# Patient Record
Sex: FEMALE | Race: OTHER | HISPANIC OR LATINO | URBAN - METROPOLITAN AREA
[De-identification: names, ages, dates, MRNs, and addresses within clinical notes are randomized per-mention and may not be internally consistent; named-entity substitution may affect disease eponyms.]

---

## 2017-04-23 ENCOUNTER — EMERGENCY (EMERGENCY)
Facility: HOSPITAL | Age: 22
LOS: 0 days | Discharge: HOME | End: 2017-04-23

## 2017-06-27 DIAGNOSIS — M54.2 CERVICALGIA: ICD-10-CM

## 2017-06-27 DIAGNOSIS — S46.911A STRAIN OF UNSPECIFIED MUSCLE, FASCIA AND TENDON AT SHOULDER AND UPPER ARM LEVEL, RIGHT ARM, INITIAL ENCOUNTER: ICD-10-CM
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
## 2017-07-24 ENCOUNTER — EMERGENCY (EMERGENCY)
Facility: HOSPITAL | Age: 22
LOS: 0 days | Discharge: HOME | End: 2017-07-24

## 2017-07-24 DIAGNOSIS — N89.8 OTHER SPECIFIED NONINFLAMMATORY DISORDERS OF VAGINA: ICD-10-CM

## 2017-07-24 DIAGNOSIS — Z87.891 PERSONAL HISTORY OF NICOTINE DEPENDENCE: ICD-10-CM

## 2017-12-12 ENCOUNTER — EMERGENCY (EMERGENCY)
Facility: HOSPITAL | Age: 22
LOS: 0 days | Discharge: HOME | End: 2017-12-12

## 2017-12-12 DIAGNOSIS — R07.9 CHEST PAIN, UNSPECIFIED: ICD-10-CM

## 2017-12-12 DIAGNOSIS — R05 COUGH: ICD-10-CM

## 2017-12-12 DIAGNOSIS — Z11.3 ENCOUNTER FOR SCREENING FOR INFECTIONS WITH A PREDOMINANTLY SEXUAL MODE OF TRANSMISSION: ICD-10-CM

## 2017-12-12 DIAGNOSIS — R06.02 SHORTNESS OF BREATH: ICD-10-CM

## 2017-12-13 ENCOUNTER — OUTPATIENT (OUTPATIENT)
Dept: OUTPATIENT SERVICES | Facility: HOSPITAL | Age: 22
LOS: 1 days | Discharge: HOME | End: 2017-12-13

## 2017-12-13 ENCOUNTER — APPOINTMENT (OUTPATIENT)
Dept: INTERNAL MEDICINE | Facility: CLINIC | Age: 22
End: 2017-12-13

## 2017-12-13 VITALS
BODY MASS INDEX: 20.91 KG/M2 | TEMPERATURE: 96.8 F | HEIGHT: 63 IN | RESPIRATION RATE: 16 BRPM | DIASTOLIC BLOOD PRESSURE: 77 MMHG | SYSTOLIC BLOOD PRESSURE: 118 MMHG | WEIGHT: 118 LBS | HEART RATE: 61 BPM

## 2017-12-13 DIAGNOSIS — F19.90 OTHER PSYCHOACTIVE SUBSTANCE USE, UNSPECIFIED, UNCOMPLICATED: ICD-10-CM

## 2017-12-13 RX ORDER — IBUPROFEN 200 MG/1
200 TABLET, FILM COATED ORAL
Qty: 14 | Refills: 0 | Status: ACTIVE | COMMUNITY
Start: 2017-12-13 | End: 1900-01-01

## 2017-12-23 ENCOUNTER — EMERGENCY (EMERGENCY)
Facility: HOSPITAL | Age: 22
LOS: 0 days | Discharge: HOME | End: 2017-12-23

## 2017-12-23 DIAGNOSIS — R30.0 DYSURIA: ICD-10-CM

## 2017-12-23 DIAGNOSIS — N89.8 OTHER SPECIFIED NONINFLAMMATORY DISORDERS OF VAGINA: ICD-10-CM

## 2017-12-25 ENCOUNTER — EMERGENCY (EMERGENCY)
Facility: HOSPITAL | Age: 22
LOS: 0 days | Discharge: HOME | End: 2017-12-25

## 2017-12-25 DIAGNOSIS — N89.8 OTHER SPECIFIED NONINFLAMMATORY DISORDERS OF VAGINA: ICD-10-CM

## 2017-12-25 DIAGNOSIS — L29.8 OTHER PRURITUS: ICD-10-CM

## 2018-01-04 LAB — CYTOLOGY CVX/VAG DOC THIN PREP: NORMAL

## 2018-01-11 ENCOUNTER — APPOINTMENT (OUTPATIENT)
Dept: INTERNAL MEDICINE | Facility: CLINIC | Age: 23
End: 2018-01-11

## 2018-01-24 ENCOUNTER — RESULT REVIEW (OUTPATIENT)
Age: 23
End: 2018-01-24

## 2018-01-25 ENCOUNTER — APPOINTMENT (OUTPATIENT)
Dept: INTERNAL MEDICINE | Facility: CLINIC | Age: 23
End: 2018-01-25

## 2018-01-25 ENCOUNTER — OUTPATIENT (OUTPATIENT)
Dept: OUTPATIENT SERVICES | Facility: HOSPITAL | Age: 23
LOS: 1 days | Discharge: HOME | End: 2018-01-25

## 2018-01-25 DIAGNOSIS — Z11.3 ENCOUNTER FOR SCREENING FOR INFECTIONS WITH A PREDOMINANTLY SEXUAL MODE OF TRANSMISSION: ICD-10-CM

## 2018-01-25 DIAGNOSIS — Z01.419 ENCOUNTER FOR GYNECOLOGICAL EXAMINATION (GENERAL) (ROUTINE) W/OUT ABNORMAL FINDINGS: ICD-10-CM

## 2018-01-27 ENCOUNTER — RESULT REVIEW (OUTPATIENT)
Age: 23
End: 2018-01-27

## 2018-01-30 LAB
CHLAMYDIA TRACHOMATIS(SIUH): NOT DETECTED
HBV CORE AB SER-ACNC: NONREACTIVE
HBV SURFACE AB SER-ACNC: NONREACTIVE
HBV SURFACE AG SER-ACNC: NONREACTIVE
HIV1 AB SER QL: NONREACTIVE
HSV1 DNA SPEC QL NAA+PROBE: NOT DETECTED
HSV2 DNA SPEC QL NAA+PROBE: NOT DETECTED
N GONORRHOEA RRNA SPEC QL NAA+PROBE: NOT DETECTED
SPECIMEN SOURCE: NORMAL
T PALLIDUM AB SER QL: NEGATIVE
T PALLIDUM AB SER-ACNC: <0.1 INDEX

## 2018-02-02 LAB — HUMAN PAPILLOMA VIRUS HR(SIUH): NOT DETECTED

## 2018-02-04 ENCOUNTER — EMERGENCY (EMERGENCY)
Facility: HOSPITAL | Age: 23
LOS: 0 days | Discharge: HOME | End: 2018-02-05
Attending: EMERGENCY MEDICINE | Admitting: INTERNAL MEDICINE

## 2018-02-04 VITALS
OXYGEN SATURATION: 98 % | RESPIRATION RATE: 16 BRPM | HEART RATE: 61 BPM | TEMPERATURE: 98 F | DIASTOLIC BLOOD PRESSURE: 79 MMHG | SYSTOLIC BLOOD PRESSURE: 120 MMHG

## 2018-02-04 VITALS
RESPIRATION RATE: 18 BRPM | OXYGEN SATURATION: 100 % | TEMPERATURE: 99 F | HEART RATE: 84 BPM | DIASTOLIC BLOOD PRESSURE: 70 MMHG | SYSTOLIC BLOOD PRESSURE: 118 MMHG

## 2018-02-04 DIAGNOSIS — O99.619 DISEASES OF THE DIGESTIVE SYSTEM COMPLICATING PREGNANCY, UNSPECIFIED TRIMESTER: ICD-10-CM

## 2018-02-04 DIAGNOSIS — R10.30 LOWER ABDOMINAL PAIN, UNSPECIFIED: ICD-10-CM

## 2018-02-04 DIAGNOSIS — Z79.899 OTHER LONG TERM (CURRENT) DRUG THERAPY: ICD-10-CM

## 2018-02-04 LAB
ALBUMIN SERPL ELPH-MCNC: 4.5 G/DL — SIGNIFICANT CHANGE UP (ref 3–5.5)
ALP SERPL-CCNC: 33 U/L — SIGNIFICANT CHANGE UP (ref 30–115)
ALT FLD-CCNC: 14 U/L — SIGNIFICANT CHANGE UP (ref 0–41)
ANION GAP SERPL CALC-SCNC: 8 MMOL/L — SIGNIFICANT CHANGE UP (ref 7–14)
APPEARANCE UR: (no result)
APTT BLD: 33.6 SEC — SIGNIFICANT CHANGE UP (ref 27–39.2)
AST SERPL-CCNC: 29 U/L — SIGNIFICANT CHANGE UP (ref 0–41)
BACTERIA # UR AUTO: (no result) /HPF
BASOPHILS # BLD AUTO: 0.03 K/UL — SIGNIFICANT CHANGE UP (ref 0–0.2)
BASOPHILS NFR BLD AUTO: 0.4 % — SIGNIFICANT CHANGE UP (ref 0–1)
BILIRUB DIRECT SERPL-MCNC: 0.4 MG/DL — HIGH (ref 0–0.2)
BILIRUB INDIRECT FLD-MCNC: 1 MG/DL — SIGNIFICANT CHANGE UP
BILIRUB SERPL-MCNC: 1.4 MG/DL — HIGH (ref 0.2–1.2)
BILIRUB UR-MCNC: NEGATIVE — SIGNIFICANT CHANGE UP
BUN SERPL-MCNC: 11 MG/DL — SIGNIFICANT CHANGE UP (ref 10–20)
CALCIUM SERPL-MCNC: 9.9 MG/DL — SIGNIFICANT CHANGE UP (ref 8.5–10.1)
CHLORIDE SERPL-SCNC: 103 MMOL/L — SIGNIFICANT CHANGE UP (ref 98–110)
CO2 SERPL-SCNC: 25 MMOL/L — SIGNIFICANT CHANGE UP (ref 17–32)
COLOR SPEC: YELLOW — SIGNIFICANT CHANGE UP
CREAT SERPL-MCNC: 0.6 MG/DL — LOW (ref 0.7–1.5)
DIFF PNL FLD: NEGATIVE — SIGNIFICANT CHANGE UP
EOSINOPHIL # BLD AUTO: 0.01 K/UL — SIGNIFICANT CHANGE UP (ref 0–0.7)
EOSINOPHIL NFR BLD AUTO: 0.1 % — SIGNIFICANT CHANGE UP (ref 0–8)
EPI CELLS # UR: (no result) /HPF
GLUCOSE SERPL-MCNC: 78 MG/DL — SIGNIFICANT CHANGE UP (ref 70–110)
GLUCOSE UR QL: NEGATIVE MG/DL — SIGNIFICANT CHANGE UP
HCT VFR BLD CALC: 42.6 % — SIGNIFICANT CHANGE UP (ref 37–47)
HGB BLD-MCNC: 13.7 G/DL — LOW (ref 14–18)
IMM GRANULOCYTES NFR BLD AUTO: 0.3 % — SIGNIFICANT CHANGE UP (ref 0.1–0.3)
INR BLD: 1.08 RATIO — SIGNIFICANT CHANGE UP (ref 0.65–1.3)
KETONES UR-MCNC: 40
LACTATE SERPL-SCNC: 1.4 MMOL/L — SIGNIFICANT CHANGE UP (ref 0.5–2.2)
LEUKOCYTE ESTERASE UR-ACNC: NEGATIVE — SIGNIFICANT CHANGE UP
LIDOCAIN IGE QN: 18 U/L — SIGNIFICANT CHANGE UP (ref 7–60)
LYMPHOCYTES # BLD AUTO: 2.14 K/UL — SIGNIFICANT CHANGE UP (ref 1.2–3.4)
LYMPHOCYTES # BLD AUTO: 29 % — SIGNIFICANT CHANGE UP (ref 20.5–51.1)
MCHC RBC-ENTMCNC: 26.9 PG — LOW (ref 27–31)
MCHC RBC-ENTMCNC: 32.2 G/DL — SIGNIFICANT CHANGE UP (ref 32–37)
MCV RBC AUTO: 83.7 FL — SIGNIFICANT CHANGE UP (ref 81–91)
MONOCYTES # BLD AUTO: 0.65 K/UL — HIGH (ref 0.1–0.6)
MONOCYTES NFR BLD AUTO: 8.8 % — SIGNIFICANT CHANGE UP (ref 1.7–9.3)
NEUTROPHILS # BLD AUTO: 4.54 K/UL — SIGNIFICANT CHANGE UP (ref 1.4–6.5)
NEUTROPHILS NFR BLD AUTO: 61.4 % — SIGNIFICANT CHANGE UP (ref 42.2–75.2)
NITRITE UR-MCNC: NEGATIVE — SIGNIFICANT CHANGE UP
NRBC # BLD: 0 /100 WBCS — SIGNIFICANT CHANGE UP (ref 0–0)
PH UR: 6.5 — SIGNIFICANT CHANGE UP (ref 5–8)
PLATELET # BLD AUTO: 242 K/UL — SIGNIFICANT CHANGE UP (ref 130–400)
POTASSIUM SERPL-MCNC: 4.7 MMOL/L — SIGNIFICANT CHANGE UP (ref 3.5–5)
POTASSIUM SERPL-SCNC: 4.7 MMOL/L — SIGNIFICANT CHANGE UP (ref 3.5–5)
PROT SERPL-MCNC: 7.2 G/DL — SIGNIFICANT CHANGE UP (ref 6–8)
PROT UR-MCNC: NEGATIVE MG/DL — SIGNIFICANT CHANGE UP
PROTHROM AB SERPL-ACNC: 11.7 SEC — SIGNIFICANT CHANGE UP (ref 9.95–12.87)
RBC # BLD: 5.09 M/UL — SIGNIFICANT CHANGE UP (ref 4.2–5.4)
RBC # FLD: 13.2 % — SIGNIFICANT CHANGE UP (ref 11.5–14.5)
RBC CASTS # UR COMP ASSIST: (no result) /HPF
SODIUM SERPL-SCNC: 136 MMOL/L — SIGNIFICANT CHANGE UP (ref 135–146)
SP GR SPEC: 1.02 — SIGNIFICANT CHANGE UP (ref 1.01–1.03)
UROBILINOGEN FLD QL: 1 MG/DL (ref 0.2–0.2)
WBC # BLD: 7.39 K/UL — SIGNIFICANT CHANGE UP (ref 4.8–10.8)
WBC # FLD AUTO: 7.39 K/UL — SIGNIFICANT CHANGE UP (ref 4.8–10.8)

## 2018-02-04 RX ORDER — ONDANSETRON 8 MG/1
4 TABLET, FILM COATED ORAL ONCE
Qty: 0 | Refills: 0 | Status: COMPLETED | OUTPATIENT
Start: 2018-02-04 | End: 2018-02-04

## 2018-02-04 RX ORDER — SODIUM CHLORIDE 9 MG/ML
3 INJECTION INTRAMUSCULAR; INTRAVENOUS; SUBCUTANEOUS ONCE
Qty: 0 | Refills: 0 | Status: COMPLETED | OUTPATIENT
Start: 2018-02-04 | End: 2018-02-04

## 2018-02-04 RX ADMIN — ONDANSETRON 4 MILLIGRAM(S): 8 TABLET, FILM COATED ORAL at 22:08

## 2018-02-04 RX ADMIN — SODIUM CHLORIDE 3 MILLILITER(S): 9 INJECTION INTRAMUSCULAR; INTRAVENOUS; SUBCUTANEOUS at 22:08

## 2018-02-04 NOTE — ED PROVIDER NOTE - PROGRESS NOTE DETAILS
Patient is being evaluated by surgery, pending their recommendations patient is not in ED, as per RN she searched for patient since 2140 hours and not found in ED, I have called patient over the phone and as per patient, her pain got better, so she decided to go home to follow up with her OBGYN doctor tomorrow. I encouraged patient to come back to ED ASAP, Pt verbalized understanding my recommendations and still preferred to follow up with her OBGYN doctor in AM.

## 2018-02-05 DIAGNOSIS — Z00.00 ENCOUNTER FOR GENERAL ADULT MEDICAL EXAMINATION WITHOUT ABNORMAL FINDINGS: ICD-10-CM

## 2018-02-05 LAB
BLD GP AB SCN SERPL QL: SIGNIFICANT CHANGE UP
HCG SERPL-ACNC: HIGH MIU/ML (ref 0–5)
TYPE + AB SCN PNL BLD: SIGNIFICANT CHANGE UP

## 2018-02-05 RX ORDER — METOCLOPRAMIDE HCL 10 MG
10 TABLET ORAL ONCE
Qty: 0 | Refills: 0 | Status: COMPLETED | OUTPATIENT
Start: 2018-02-05 | End: 2018-02-05

## 2018-02-05 RX ORDER — NITROFURANTOIN MACROCRYSTAL 50 MG
1 CAPSULE ORAL
Qty: 14 | Refills: 0 | OUTPATIENT
Start: 2018-02-05 | End: 2018-02-11

## 2018-02-05 RX ADMIN — Medication 104 MILLIGRAM(S): at 02:00

## 2018-02-05 NOTE — ED PROVIDER NOTE - CHIEF COMPLAINT
The patient is a 22y Female complaining of abdominal pain.
The patient is a 22y Female complaining of abdominal pain.

## 2018-02-05 NOTE — ED PROVIDER NOTE - PROGRESS NOTE DETAILS
patient remained stable in ED, improved well, patient had no abdominal pain during the ED course. patient tolerated PO, remained comfortable in ED  Discussed with patient in detail about her clinical condition, need for close outpatient follow up, antibiotics and pelvic rest, Patient verbalized understanding the aftercare instructions and agreed.   patient is awake, alert, o x 3, ambulatory in ED, comfortable and want to go home.   Pt is instructed well to f/u as outpatient, she verbalized understanding and agreed.

## 2018-02-05 NOTE — ED PROVIDER NOTE - OBJECTIVE STATEMENT
patient is c/o abd pain, intermittent episodes of cramping type lower abd area, associated with nausea and diarrhea, +dysuria, urgency, frequency, patient denies any vaginal bleeding, denies any fever/chills/chest pain/sob, denies any blood in the stool, denies any recent travel, no sick contacts. Denies any flank pain/back pain/upper abd pain. Denies any abdominal pain in ED. Patient states that she is hungry and want to eat.

## 2018-06-23 ENCOUNTER — INPATIENT (INPATIENT)
Facility: HOSPITAL | Age: 23
LOS: 2 days | Discharge: HOME | End: 2018-06-26
Attending: INTERNAL MEDICINE | Admitting: INTERNAL MEDICINE

## 2018-06-23 VITALS
RESPIRATION RATE: 18 BRPM | TEMPERATURE: 98 F | DIASTOLIC BLOOD PRESSURE: 71 MMHG | OXYGEN SATURATION: 98 % | HEART RATE: 58 BPM | SYSTOLIC BLOOD PRESSURE: 113 MMHG

## 2018-06-23 LAB
ANION GAP SERPL CALC-SCNC: 17 MMOL/L — HIGH (ref 7–14)
APTT BLD: 40.4 SEC — HIGH (ref 27–39.2)
BASOPHILS # BLD AUTO: 0.03 K/UL — SIGNIFICANT CHANGE UP (ref 0–0.2)
BASOPHILS NFR BLD AUTO: 0.4 % — SIGNIFICANT CHANGE UP (ref 0–1)
BUN SERPL-MCNC: 9 MG/DL — LOW (ref 10–20)
CALCIUM SERPL-MCNC: 9.4 MG/DL — SIGNIFICANT CHANGE UP (ref 8.5–10.1)
CHLORIDE SERPL-SCNC: 99 MMOL/L — SIGNIFICANT CHANGE UP (ref 98–110)
CO2 SERPL-SCNC: 22 MMOL/L — SIGNIFICANT CHANGE UP (ref 17–32)
CREAT SERPL-MCNC: 0.7 MG/DL — SIGNIFICANT CHANGE UP (ref 0.7–1.5)
EOSINOPHIL # BLD AUTO: 0.04 K/UL — SIGNIFICANT CHANGE UP (ref 0–0.7)
EOSINOPHIL NFR BLD AUTO: 0.5 % — SIGNIFICANT CHANGE UP (ref 0–8)
GLUCOSE SERPL-MCNC: 88 MG/DL — SIGNIFICANT CHANGE UP (ref 70–99)
HCG SERPL QL: NEGATIVE — SIGNIFICANT CHANGE UP
HCT VFR BLD CALC: 43.1 % — SIGNIFICANT CHANGE UP (ref 37–47)
HGB BLD-MCNC: 13.9 G/DL — SIGNIFICANT CHANGE UP (ref 12–16)
IMM GRANULOCYTES NFR BLD AUTO: 0.3 % — SIGNIFICANT CHANGE UP (ref 0.1–0.3)
INR BLD: 1.06 RATIO — SIGNIFICANT CHANGE UP (ref 0.65–1.3)
LYMPHOCYTES # BLD AUTO: 1.95 K/UL — SIGNIFICANT CHANGE UP (ref 1.2–3.4)
LYMPHOCYTES # BLD AUTO: 24.8 % — SIGNIFICANT CHANGE UP (ref 20.5–51.1)
MCHC RBC-ENTMCNC: 27.4 PG — SIGNIFICANT CHANGE UP (ref 27–31)
MCHC RBC-ENTMCNC: 32.3 G/DL — SIGNIFICANT CHANGE UP (ref 32–37)
MCV RBC AUTO: 84.8 FL — SIGNIFICANT CHANGE UP (ref 81–99)
MONOCYTES # BLD AUTO: 0.82 K/UL — HIGH (ref 0.1–0.6)
MONOCYTES NFR BLD AUTO: 10.4 % — HIGH (ref 1.7–9.3)
NEUTROPHILS # BLD AUTO: 5 K/UL — SIGNIFICANT CHANGE UP (ref 1.4–6.5)
NEUTROPHILS NFR BLD AUTO: 63.6 % — SIGNIFICANT CHANGE UP (ref 42.2–75.2)
NRBC # BLD: 0 /100 WBCS — SIGNIFICANT CHANGE UP (ref 0–0)
PLATELET # BLD AUTO: 227 K/UL — SIGNIFICANT CHANGE UP (ref 130–400)
POTASSIUM SERPL-MCNC: 4.8 MMOL/L — SIGNIFICANT CHANGE UP (ref 3.5–5)
POTASSIUM SERPL-SCNC: 4.8 MMOL/L — SIGNIFICANT CHANGE UP (ref 3.5–5)
PROTHROM AB SERPL-ACNC: 11.5 SEC — SIGNIFICANT CHANGE UP (ref 9.95–12.87)
RBC # BLD: 5.08 M/UL — SIGNIFICANT CHANGE UP (ref 4.2–5.4)
RBC # FLD: 13 % — SIGNIFICANT CHANGE UP (ref 11.5–14.5)
SODIUM SERPL-SCNC: 138 MMOL/L — SIGNIFICANT CHANGE UP (ref 135–146)
WBC # BLD: 7.86 K/UL — SIGNIFICANT CHANGE UP (ref 4.8–10.8)
WBC # FLD AUTO: 7.86 K/UL — SIGNIFICANT CHANGE UP (ref 4.8–10.8)

## 2018-06-23 RX ORDER — CEFTRIAXONE 500 MG/1
1 INJECTION, POWDER, FOR SOLUTION INTRAMUSCULAR; INTRAVENOUS ONCE
Qty: 0 | Refills: 0 | Status: COMPLETED | OUTPATIENT
Start: 2018-06-23 | End: 2018-06-23

## 2018-06-23 RX ORDER — OXYCODONE HYDROCHLORIDE 5 MG/1
5 TABLET ORAL
Qty: 0 | Refills: 0 | Status: DISCONTINUED | OUTPATIENT
Start: 2018-06-23 | End: 2018-06-26

## 2018-06-23 RX ORDER — METRONIDAZOLE 500 MG
500 TABLET ORAL EVERY 8 HOURS
Qty: 0 | Refills: 0 | Status: DISCONTINUED | OUTPATIENT
Start: 2018-06-24 | End: 2018-06-26

## 2018-06-23 RX ORDER — CEFTRIAXONE 500 MG/1
INJECTION, POWDER, FOR SOLUTION INTRAMUSCULAR; INTRAVENOUS
Qty: 0 | Refills: 0 | Status: DISCONTINUED | OUTPATIENT
Start: 2018-06-23 | End: 2018-06-26

## 2018-06-23 RX ORDER — KETOROLAC TROMETHAMINE 30 MG/ML
30 SYRINGE (ML) INJECTION THREE TIMES A DAY
Qty: 0 | Refills: 0 | Status: DISCONTINUED | OUTPATIENT
Start: 2018-06-23 | End: 2018-06-26

## 2018-06-23 RX ORDER — CEFTRIAXONE 500 MG/1
250 INJECTION, POWDER, FOR SOLUTION INTRAMUSCULAR; INTRAVENOUS ONCE
Qty: 0 | Refills: 0 | Status: COMPLETED | OUTPATIENT
Start: 2018-06-23 | End: 2018-06-23

## 2018-06-23 RX ORDER — METRONIDAZOLE 500 MG
TABLET ORAL
Qty: 0 | Refills: 0 | Status: DISCONTINUED | OUTPATIENT
Start: 2018-06-24 | End: 2018-06-26

## 2018-06-23 RX ORDER — AMPICILLIN SODIUM AND SULBACTAM SODIUM 250; 125 MG/ML; MG/ML
3 INJECTION, POWDER, FOR SUSPENSION INTRAMUSCULAR; INTRAVENOUS ONCE
Qty: 0 | Refills: 0 | Status: COMPLETED | OUTPATIENT
Start: 2018-06-23 | End: 2018-06-23

## 2018-06-23 RX ORDER — CEFTRIAXONE 500 MG/1
1 INJECTION, POWDER, FOR SOLUTION INTRAMUSCULAR; INTRAVENOUS EVERY 24 HOURS
Qty: 0 | Refills: 0 | Status: DISCONTINUED | OUTPATIENT
Start: 2018-06-24 | End: 2018-06-26

## 2018-06-23 RX ORDER — KETOROLAC TROMETHAMINE 30 MG/ML
30 SYRINGE (ML) INJECTION ONCE
Qty: 0 | Refills: 0 | Status: DISCONTINUED | OUTPATIENT
Start: 2018-06-23 | End: 2018-06-23

## 2018-06-23 RX ORDER — SODIUM CHLORIDE 9 MG/ML
1000 INJECTION INTRAMUSCULAR; INTRAVENOUS; SUBCUTANEOUS ONCE
Qty: 0 | Refills: 0 | Status: COMPLETED | OUTPATIENT
Start: 2018-06-23 | End: 2018-06-23

## 2018-06-23 RX ORDER — ACETAMINOPHEN 500 MG
650 TABLET ORAL EVERY 6 HOURS
Qty: 0 | Refills: 0 | Status: DISCONTINUED | OUTPATIENT
Start: 2018-06-23 | End: 2018-06-26

## 2018-06-23 RX ORDER — METRONIDAZOLE 500 MG
500 TABLET ORAL ONCE
Qty: 0 | Refills: 0 | Status: COMPLETED | OUTPATIENT
Start: 2018-06-23 | End: 2018-06-24

## 2018-06-23 RX ORDER — AZITHROMYCIN 500 MG/1
1 TABLET, FILM COATED ORAL ONCE
Qty: 0 | Refills: 0 | Status: COMPLETED | OUTPATIENT
Start: 2018-06-23 | End: 2018-06-23

## 2018-06-23 RX ADMIN — CEFTRIAXONE 250 MILLIGRAM(S): 500 INJECTION, POWDER, FOR SOLUTION INTRAMUSCULAR; INTRAVENOUS at 20:09

## 2018-06-23 RX ADMIN — SODIUM CHLORIDE 1000 MILLILITER(S): 9 INJECTION INTRAMUSCULAR; INTRAVENOUS; SUBCUTANEOUS at 18:26

## 2018-06-23 RX ADMIN — AZITHROMYCIN 1 GRAM(S): 500 TABLET, FILM COATED ORAL at 20:09

## 2018-06-23 RX ADMIN — AMPICILLIN SODIUM AND SULBACTAM SODIUM 200 GRAM(S): 250; 125 INJECTION, POWDER, FOR SUSPENSION INTRAMUSCULAR; INTRAVENOUS at 20:36

## 2018-06-23 NOTE — H&P ADULT - ATTENDING COMMENTS
21 y/o F with no significant PMH or PSH presented with left jaw pain, warmth and swelling for past 1 week associated with broken tooth. Patient states that pain is constant severe, sharp, radiates to her left ear and left side of her head, exacerbated by chewing and swallowing associated with foul smelling breath.     Otherwise, patient admits to being sexually active with one partner, without protection noted yellowish vaginal discharge.    PMH: No prior medical history.  PSH: No prior surgical history.       ROS:  CONSTITUTIONAL: No fever, chills, generalized weakness or fatigue.  EYES: No eye pain, visual disturbances, or discharge.  ENMT:  Left jaw pain, warmth and swelling for past 1 week associated with Left ear pain and hearing changes in left ear. No tinnitus, vertigo; No sinus or throat pain.  RESPIRATORY: No cough, wheezing, chills or hemoptysis; No shortness of breath.  CARDIOVASCULAR: No chest pain, palpitations, dizziness, or leg swelling.  GASTROINTESTINAL: No abdominal or epigastric pain. No nausea, vomiting, or hematemesis; No diarrhea or constipation. No melena or hematochezia.  GENITOURINARY: No dysuria, frequency, hematuria, or incontinence.  NEUROLOGICAL: No headaches, memory loss, loss of strength, numbness, or tremors.  SKIN: No itching, burning, rashes, or lesions.  ENDOCRINE: No heat or cold intolerance; No hair loss.  MUSCULOSKELETAL: No joint pain or swelling; No muscle, back, or extremity pain.  HEME/LYMPH: No easy bruising, or bleeding gums.  GYN: White vaginal discharge.  SKIN: No rash or itchiness.    PE:  GENERAL: NAD, well-groomed, well-developed.  HEAD:  Atraumatic, Normocephalic.  EYES: EOMI, PERRLA, conjunctiva and sclera clear.  ENMT: Left jaw swelling. Good dentition except at the site of single broken tooth. Moist mucous membranes. No lesions.  NERVOUS SYSTEM:  Alert & Oriented X3, Good concentration; No limb weakness or numbness.  RESPIRATORY: Clear to percussion bilaterally; No rales, rhonchi, wheezing, or rubs.  CARDIOVASCULAR: Regular rate and rhythm; No murmurs, rubs, or gallops.  GASTROINTESTINAL: Soft, Nontender, Nondistended; Bowel sounds present.  MUSCULOSKELETAL: Motor Strength 5/5 B/L upper and lower extremities;   EXTREMITIES:  No edema, cyanosis or clubbing.  LYMPH: Posterior cervical lymphadenopathy.  SKIN: Skin tags noted over right neck noted to be from birth.    # Left dental abscess  - c/w IV antibiotics, pain management  - Incision and drainage on monday as per dental  - further dental Xray as per dental    # Vaginal discharge and pain likely Vaginosis r/o STDs given h/o of unprotected sexual intercourse  - received 1g azithromycin, and 250mg IM ceftriaxone in ED  - STD testing    # DVT Prophylaxis  - encourage ambulation    All labs, radiologic studies, vitals, orders and medications list reviewed. Patient is seen and examined at bedside. Case discussed with medical team.

## 2018-06-23 NOTE — ED PROVIDER NOTE - FAMILY HISTORY
Mother  Still living? Yes, Estimated age: Age Unknown  Family history of breast cancer, Age at diagnosis: Age Unknown     Father  Still living? Unknown  Family history of diabetes mellitus (DM), Age at diagnosis: Age Unknown

## 2018-06-23 NOTE — H&P ADULT - NSHPSOCIALHISTORY_GEN_ALL_CORE
Social History:    Marital Status:  (   )    ( X ) Single    (   )    (  )     Substance Use (street drugs): (  ) never used  ( X ) other: Marijuana on a daily basis  Tobacco Usage:  (   ) never smoked   ( X ) former smoker   (   ) current smoker  (     ) pack year  (        ) last cigarette date  Alcohol Usage: occasional  Sexual History: active with one partner (unprotected) Social History:    Marital Status:  (   )    ( X ) Single    (   )    (  )     Substance Use (street drugs): (  ) never used  ( X ) other: Marijuana on a daily basis  Tobacco Usage:  (   ) never smoked   ( X ) former smoker   (   ) current smoker  (     ) pack year  (        ) last cigarette date 7 months ago  Alcohol Usage: occasional  Sexual History: active with one partner (unprotected)

## 2018-06-23 NOTE — H&P ADULT - NSHPLABSRESULTS_GEN_ALL_CORE
13.9   7.86  )-----------( 227      ( 23 Jun 2018 18:05 )             43.1     138  |  99  |  9<L>  ----------------------------<  88  4.8   |  22  |  0.7    Ca    9.4      23 Jun 2018 18:05    PT/INR - ( 23 Jun 2018 18:05 )   PT: 11.50 sec;   INR: 1.06 ratio      PTT - ( 23 Jun 2018 18:05 )  PTT:40.4 sec

## 2018-06-23 NOTE — CONSULT NOTE ADULT - ASSESSMENT
Recommend patient be admitted for IV antibiotics and pain management as per primary team. Patient will be monitored for improvement of swelling with possible incision and drainage tomorrow and/or transport to dental clinic Monday morning for radiographic evaluation and definitive treatment.    Please contact Dental via [employer] at #[phone]

## 2018-06-23 NOTE — ED PROVIDER NOTE - OBJECTIVE STATEMENT
patient states that one year ago accidentally broke her Lt lower molar tooth while biting hard, states that had not seen any doctor for it, but one week ago started having pain in and around the broken tooth area, which continued, five days ago started having swelling of the lt lower face, which continued and got worse, came to ED for evaluation. Denies any f/c/n/v/cp/sob. Patient also states that one week ago had unprotected sex, wanted to have abx, denies any symptoms, refused HIV test, states had HIV test one month ago and it was negative, so does not want to have another test. Patient denies any other symptoms.

## 2018-06-23 NOTE — ED PROVIDER NOTE - PROGRESS NOTE DETAILS
patient was evaluated by dental on call, will follow Dr. Neumann recommendations. patient remained stable in ED, as recommended by Dental, discussed with Hospitalist and MAR, patient care is transferred and patient is admitted.

## 2018-06-23 NOTE — ED PROVIDER NOTE - MEDICAL DECISION MAKING DETAILS
patient presented to ED for dental abscess, seen by dental on call, as recommended is admitted to Medicine for IV abx.

## 2018-06-23 NOTE — ED PROVIDER NOTE - PHYSICAL EXAMINATION
Face: +swelling/tenderness of the Lt lower face, no crepitus, no fluctuation  no trismus  oral cavity: Lt lower molar gum with swelling, dental caries with one of the molars missing crown, no discharge  supple neck, no lymphadenopathy.

## 2018-06-23 NOTE — H&P ADULT - FAMILY HISTORY
Mother  Still living? Yes, Estimated age: Age Unknown  Family history of breast cancer, Age at diagnosis: Age Unknown Mother  Still living? Yes, Estimated age: Age Unknown  Family history of breast cancer, Age at diagnosis: Age Unknown     Father  Still living? Unknown  Family history of diabetes mellitus (DM), Age at diagnosis: Age Unknown

## 2018-06-23 NOTE — H&P ADULT - HISTORY OF PRESENT ILLNESS
23 yo female patient with no pertinent past medical history, presented because of worsening left jaw swelling and pain over the last 4 days.  Patient says she has a dental crown that was broken its been more than a year. But recently she started having worsening severe pain of her left jaw, associated with swelling and redness and hottness of her left face. pain is severe, sharp, radiates to her left ear and left temple, exacerbated by chewing. Patient denies fever but acknowledge chills. She also has foul smelling breath.  On the side, patient says she is been sexually active with one partner, without protection. She started recently having yellowish vaginal discharge, associated with vaginal and lower abdominal pain and itchiness. She agrees to be tested for STDs including HIV.

## 2018-06-23 NOTE — ED PROVIDER NOTE - NS ED ROS FT
Lt facial swelling and Lt lower molar toothache and gum pain  unprotected sex one week ago, asymptomatic, want to have the abx for prophylaxis.

## 2018-06-23 NOTE — H&P ADULT - ASSESSMENT
21 yo female patient with no pertinent past medical history presented because of worsening left jaw swelling and pain over the last 4 days    # Left dental abscess  Seen by Dental in ED  Recommends: IV antibiotics, pain management, incision and drainage on monday  Will give rocephin/flagyl (to treat both dental abscess and STD at the same time)  Pain control with tylenol, NSAIDS, and in case of severe pain can give oxycodone    # Vaginal discharge and pain  R/O STDs (patient has history of unprotected sexual intercourse with one partner)  In ED patient received 1g azithromycin, and 250mg IM ceftriaxone  Will give flagyl too for possible bacterial vaginosis  consider Gyn consult if needed  Negative pregnancy test  Patient wants to be checked for STDs including HIV    # No need for DVT and GI prophylaxis (ambulating, young lady)  Regular diet, normal activity  Full code  dispo home

## 2018-06-23 NOTE — H&P ADULT - NSHPPHYSICALEXAM_GEN_ALL_CORE
PHYSICAL EXAM:    T(F): 98.3, Max: 98.3 (06-23-18 @ 17:27)  HR: 75  BP: 123/65  RR: 20  SpO2: 98%    GENERAL: NAD, well-developed  HEAD:  Left jaw/mandibular swelling and erythema and hottness  EYES: EOMI, PERRLA, conjunctiva and sclera clear  NECK: Supple, No JVD  CHEST/LUNG: Clear to auscultation bilaterally; No wheeze  HEART: Regular rate and rhythm; No murmurs, rubs, or gallops  ABDOMEN: Soft, Nontender, Nondistended; Bowel sounds present  EXTREMITIES:  2+ Peripheral Pulses, No clubbing, cyanosis, or edema  PSYCH: AAOx3  NEUROLOGY: non-focal  SKIN: No rashes or lesions

## 2018-06-24 RX ADMIN — OXYCODONE HYDROCHLORIDE 5 MILLIGRAM(S): 5 TABLET ORAL at 11:15

## 2018-06-24 RX ADMIN — Medication 30 MILLIGRAM(S): at 00:40

## 2018-06-24 RX ADMIN — Medication 100 MILLIGRAM(S): at 00:24

## 2018-06-24 RX ADMIN — Medication 100 MILLIGRAM(S): at 14:23

## 2018-06-24 RX ADMIN — OXYCODONE HYDROCHLORIDE 5 MILLIGRAM(S): 5 TABLET ORAL at 10:44

## 2018-06-24 RX ADMIN — Medication 30 MILLIGRAM(S): at 20:08

## 2018-06-24 RX ADMIN — Medication 30 MILLIGRAM(S): at 21:04

## 2018-06-24 RX ADMIN — Medication 30 MILLIGRAM(S): at 12:56

## 2018-06-24 RX ADMIN — Medication 100 MILLIGRAM(S): at 05:36

## 2018-06-24 RX ADMIN — Medication 100 MILLIGRAM(S): at 21:05

## 2018-06-24 RX ADMIN — Medication 30 MILLIGRAM(S): at 13:10

## 2018-06-24 RX ADMIN — Medication 30 MILLIGRAM(S): at 00:07

## 2018-06-24 RX ADMIN — CEFTRIAXONE 100 GRAM(S): 500 INJECTION, POWDER, FOR SOLUTION INTRAMUSCULAR; INTRAVENOUS at 22:24

## 2018-06-24 RX ADMIN — CEFTRIAXONE 100 GRAM(S): 500 INJECTION, POWDER, FOR SOLUTION INTRAMUSCULAR; INTRAVENOUS at 00:55

## 2018-06-24 RX ADMIN — OXYCODONE HYDROCHLORIDE 5 MILLIGRAM(S): 5 TABLET ORAL at 03:46

## 2018-06-24 NOTE — PROGRESS NOTE ADULT - SUBJECTIVE AND OBJECTIVE BOX
Patient states swelling has been improving, but pain and discomfort has been almost the same. No symptoms have worsened. Clinically patient is positive to palpation around #18, 19 and 20 lingually and buccally. Extra-oral palpation of positive. Swelling is firm extends from #17 to 22 intra oral and extraorally Could not pinpoint center for incisional and drainage. No difficulty swallowing or breathing.    Plan: Continue IV antibiotics and will transport patient tomorrow for further evaluation and treatment.

## 2018-06-25 LAB
ALBUMIN SERPL ELPH-MCNC: 3.7 G/DL — SIGNIFICANT CHANGE UP (ref 3.5–5.2)
ALP SERPL-CCNC: 32 U/L — SIGNIFICANT CHANGE UP (ref 30–115)
ALT FLD-CCNC: 10 U/L — SIGNIFICANT CHANGE UP (ref 0–41)
ANION GAP SERPL CALC-SCNC: 12 MMOL/L — SIGNIFICANT CHANGE UP (ref 7–14)
AST SERPL-CCNC: 25 U/L — SIGNIFICANT CHANGE UP (ref 0–41)
BILIRUB SERPL-MCNC: 0.5 MG/DL — SIGNIFICANT CHANGE UP (ref 0.2–1.2)
BUN SERPL-MCNC: 12 MG/DL — SIGNIFICANT CHANGE UP (ref 10–20)
CALCIUM SERPL-MCNC: 8.7 MG/DL — SIGNIFICANT CHANGE UP (ref 8.5–10.1)
CHLORIDE SERPL-SCNC: 103 MMOL/L — SIGNIFICANT CHANGE UP (ref 98–110)
CO2 SERPL-SCNC: 26 MMOL/L — SIGNIFICANT CHANGE UP (ref 17–32)
CREAT SERPL-MCNC: 0.8 MG/DL — SIGNIFICANT CHANGE UP (ref 0.7–1.5)
ERYTHROCYTE [SEDIMENTATION RATE] IN BLOOD: 8 MM/HR — SIGNIFICANT CHANGE UP (ref 0–15)
GLUCOSE SERPL-MCNC: 87 MG/DL — SIGNIFICANT CHANGE UP (ref 70–99)
HBV SURFACE AB SER-ACNC: SIGNIFICANT CHANGE UP
HBV SURFACE AG SER-ACNC: SIGNIFICANT CHANGE UP
HCT VFR BLD CALC: 35.6 % — LOW (ref 37–47)
HCV AB S/CO SERPL IA: 0.16 S/CO — SIGNIFICANT CHANGE UP
HCV AB SERPL-IMP: SIGNIFICANT CHANGE UP
HGB BLD-MCNC: 11.7 G/DL — LOW (ref 12–16)
HIV 1+2 AB+HIV1 P24 AG SERPL QL IA: SIGNIFICANT CHANGE UP
MAGNESIUM SERPL-MCNC: 1.9 MG/DL — SIGNIFICANT CHANGE UP (ref 1.8–2.4)
MCHC RBC-ENTMCNC: 27.9 PG — SIGNIFICANT CHANGE UP (ref 27–31)
MCHC RBC-ENTMCNC: 32.9 G/DL — SIGNIFICANT CHANGE UP (ref 32–37)
MCV RBC AUTO: 84.8 FL — SIGNIFICANT CHANGE UP (ref 81–99)
NRBC # BLD: 0 /100 WBCS — SIGNIFICANT CHANGE UP (ref 0–0)
PLATELET # BLD AUTO: 198 K/UL — SIGNIFICANT CHANGE UP (ref 130–400)
POTASSIUM SERPL-MCNC: 3.9 MMOL/L — SIGNIFICANT CHANGE UP (ref 3.5–5)
POTASSIUM SERPL-SCNC: 3.9 MMOL/L — SIGNIFICANT CHANGE UP (ref 3.5–5)
PROT SERPL-MCNC: 6.2 G/DL — SIGNIFICANT CHANGE UP (ref 6–8)
RBC # BLD: 4.2 M/UL — SIGNIFICANT CHANGE UP (ref 4.2–5.4)
RBC # FLD: 12.8 % — SIGNIFICANT CHANGE UP (ref 11.5–14.5)
SODIUM SERPL-SCNC: 141 MMOL/L — SIGNIFICANT CHANGE UP (ref 135–146)
T PALLIDUM AB TITR SER: NEGATIVE — SIGNIFICANT CHANGE UP
WBC # BLD: 5.29 K/UL — SIGNIFICANT CHANGE UP (ref 4.8–10.8)
WBC # FLD AUTO: 5.29 K/UL — SIGNIFICANT CHANGE UP (ref 4.8–10.8)

## 2018-06-25 RX ADMIN — Medication 650 MILLIGRAM(S): at 09:57

## 2018-06-25 RX ADMIN — Medication 30 MILLIGRAM(S): at 22:50

## 2018-06-25 RX ADMIN — CEFTRIAXONE 100 GRAM(S): 500 INJECTION, POWDER, FOR SOLUTION INTRAMUSCULAR; INTRAVENOUS at 23:37

## 2018-06-25 RX ADMIN — Medication 100 MILLIGRAM(S): at 16:25

## 2018-06-25 RX ADMIN — Medication 100 MILLIGRAM(S): at 05:02

## 2018-06-25 RX ADMIN — Medication 30 MILLIGRAM(S): at 22:01

## 2018-06-25 RX ADMIN — Medication 30 MILLIGRAM(S): at 05:01

## 2018-06-25 RX ADMIN — Medication 30 MILLIGRAM(S): at 05:57

## 2018-06-25 RX ADMIN — OXYCODONE HYDROCHLORIDE 5 MILLIGRAM(S): 5 TABLET ORAL at 00:47

## 2018-06-25 RX ADMIN — OXYCODONE HYDROCHLORIDE 5 MILLIGRAM(S): 5 TABLET ORAL at 23:37

## 2018-06-25 RX ADMIN — Medication 100 MILLIGRAM(S): at 21:36

## 2018-06-25 NOTE — CONSULT NOTE ADULT - SUBJECTIVE AND OBJECTIVE BOX
Patient is a 22y old  Female who presents with a chief complaint of left jaw swelling and pain of 4 days (23 Jun 2018 23:33)      HPI:  21 yo female patient with no pertinent past medical history, presented because of worsening left jaw swelling and pain over the last 4 days.  Patient says she has a dental crown that was broken its been more than a year. But recently she started having worsening severe pain of her left jaw, associated with swelling and redness and hottness of her left face. pain is severe, sharp, radiates to her left ear and left temple, exacerbated by chewing. Patient denies fever but acknowledge chills. She also has foul smelling breath.  On the side, patient says she is been sexually active with one partner, without protection. She started recently having yellowish vaginal discharge, associated with vaginal and lower abdominal pain and itchiness. She agrees to be tested for STDs including HIV. (23 Jun 2018 23:33)    Patient transported to dental for evaluation of dental swelling.       PAST MEDICAL & SURGICAL HISTORY:  No pertinent past medical history  No significant past surgical history    (   ) heart valve replacement  (   ) joint replacement  (   ) pregnancy    MEDICATIONS  (STANDING):  cefTRIAXone   IVPB      cefTRIAXone   IVPB 1 Gram(s) IV Intermittent every 24 hours  metroNIDAZOLE  IVPB 500 milliGRAM(s) IV Intermittent every 8 hours  metroNIDAZOLE  IVPB        MEDICATIONS  (PRN):  acetaminophen   Tablet. 650 milliGRAM(s) Oral every 6 hours PRN Mild Pain (1 - 3)  ketorolac   Injectable 30 milliGRAM(s) IV Push three times a day PRN Moderate Pain (4 - 6)  oxyCODONE    IR 5 milliGRAM(s) Oral two times a day PRN Severe Pain (7 - 10)      REVIEW OF SYSTEMS      General:	WNL    Skin/Breast:WNL  	  Ophthalmologic:WNL  	  ENMT:	WNL    Respiratory and Thorax:WNL  	  Cardiovascular:	WNL    Gastrointestinal:	WNL    Genitourinary:	WNL    Musculoskeletal:	WNL    Neurological:	WNL    Psychiatric:	WNL    Hematology/Lymphatics:	WNL  Endocrine:	WNL    Allergic/Immunologic:	none    Allergies: No Known Allergies    Intolerances: none        FAMILY HISTORY:  Family history of diabetes mellitus (DM) (Father)  Family history of breast cancer (Mother)      *SOCIAL HISTORY: (  Y )marijuana; ( Y  ) ETOH: occasionally     Vital Signs Last 24 Hrs  T(C): 36.4 (25 Jun 2018 12:54), Max: 36.4 (25 Jun 2018 12:54)  T(F): 97.6 (25 Jun 2018 12:54), Max: 97.6 (25 Jun 2018 12:54)  HR: 68 (25 Jun 2018 12:54) (60 - 73)  BP: 119/61 (25 Jun 2018 12:54) (105/55 - 119/61)  BP(mean): --  RR: 20 (25 Jun 2018 12:54) (18 - 20)  SpO2: --    LABS:                        11.7   5.29  )-----------( 198      ( 25 Jun 2018 07:24 )             35.6     06-25    141  |  103  |  12  ----------------------------<  87  3.9   |  26  |  0.8    Ca    8.7      25 Jun 2018 07:24  Mg     1.9     06-25    TPro  6.2  /  Alb  3.7  /  TBili  0.5  /  DBili  x   /  AST  25  /  ALT  10  /  AlkPhos  32  06-25    Platelet Count - Automated: 198 K/uL [130 - 400] (06-25 @ 07:24)  WBC Count: 5.29 K/uL [4.80 - 10.80] (06-25 @ 07:24)  WBC Count: 7.86 K/uL [4.80 - 10.80] (06-23 @ 18:05)  Platelet Count - Automated: 227 K/uL [130 - 400] (06-23 @ 18:05)  INR: 1.06 ratio [0.65 - 1.30] (06-23 @ 18:05)      PT/INR - ( 23 Jun 2018 18:05 )   PT: 11.50 sec;   INR: 1.06 ratio         PTT - ( 23 Jun 2018 18:05 )  PTT:40.4 sec    Last Dental Visit: <<  >>    EOE:  TMJ (n   ) clicks                     (n   ) pops                     ( n  ) crepitus             Mandible <<FROM>>             Facial bones and MOM <<grossly intact>>             (  n ) trismus             (  n ) lymphadenopathy             (  y ) swelling             (  y ) asymmetry             (  y ) palpation             (  n ) dyspnea             (  n ) dysphagia             (  n ) loss of consciousness    IOE:  <<permanent/primary/mixed>> dentition: permanent            <<grossly intact>> OR yes            <<multiple carious teeth>> OR generalized            <<multiple missing teeth>> no             Dentition Present <<  >> permanent                     Mobility <<  >>no                     Caries <<  >>yes                hard/soft palate:  (  n ) palatal torus, <<No pathology noted>>            tongue/FOM <<No pathology noted>>WNL            labial/buccal mucosa <<No pathology noted>>WNL           ( y  ) percussion #19           ( y  ) palpation #19           ( y  ) swelling #19 buccal area            ( y  ) abscess #19           ( n  ) sinus tract    *DENTAL RADIOGRAPHS: 1 panoramic taken    *ASSESSMENT: #19 severely decayed, root tips with significant buccal swelling, does not report any difficulty breathing or swallowing. Pain on palpation and percussion of #19.     *PLAN: Extraction #19 root tips and drainage of infection.    PROCEDURE:   Verbal and written consent given. Consent and side site signed. Risks and benefits reviewed as per oral surgery sheet dated 7/13/00.  Anesthesia: <<    >> Administered 2 carpule 2% lidocaine 1:100k epi via mandibular and long buccal blocks and 1 carpule 3% carbocaine PDL infiltration.  Treatment: <<    >> #19 extracted , irrigated with saline, curretaged socket, drainage successful, hemostasis obtained. Sutured socket using 3.0 gut sutures. Post op film taken.     RECOMMENDATIONS:  1) Primary team to proceed with IV antibiotics. When patient is ready to be discharged prescribe Amoxicillin 500 mg q8h disp :21 PO   2) Dental F/U with outpatient dentist for comprehensive dental care.   3) If any difficulty swallowing/breathing, fever occur, return to ER.     Resident Name, Cari Michael DMD

## 2018-06-25 NOTE — PROGRESS NOTE ADULT - ASSESSMENT
21 yo female patient with no pertinent past medical history presented because of worsening left jaw swelling and pain over the last 4 days    # Left dental abscess  i&D today , continue with antibiotics, pain controlled    # Vaginal discharge and pain  R/O STDs (patient has history of unprotected sexual intercourse with one partner)  In ED patient received 1g azithromycin, and 250mg IM ceftriaxone  if no improvement will pursue gyn consult     # No need for DVT and GI prophylaxis     #Anemia   no signs of acute blood loss, monitor    Full code  dispo home

## 2018-06-25 NOTE — PROGRESS NOTE ADULT - SUBJECTIVE AND OBJECTIVE BOX
WALKER COHEN 22y Female  MRN#: [id number]   CODE STATUS:________      SUBJECTIVE  Patient is a 22y old Female who presents with a chief complaint of left jaw swelling and pain of 4 days (23 Jun 2018 23:33)  Currently admitted to medicine with the primary diagnosis of Dental abscess   Had drainage of dental abscess today.      OBJECTIVE  PAST MEDICAL & SURGICAL HISTORY  No pertinent past medical history  No significant past surgical history    ALLERGIES:  No Known Allergies    MEDICATIONS:  STANDING MEDICATIONS  cefTRIAXone   IVPB 1 Gram(s) IV Intermittent every 24 hours  cefTRIAXone   IVPB      metroNIDAZOLE  IVPB      metroNIDAZOLE  IVPB 500 milliGRAM(s) IV Intermittent every 8 hours    PRN MEDICATIONS  acetaminophen   Tablet. 650 milliGRAM(s) Oral every 6 hours PRN  ketorolac   Injectable 30 milliGRAM(s) IV Push three times a day PRN  oxyCODONE    IR 5 milliGRAM(s) Oral two times a day PRN      VITAL SIGNS: Last 24 Hours  T(C): 36.4 (25 Jun 2018 12:54), Max: 36.4 (25 Jun 2018 12:54)  T(F): 97.6 (25 Jun 2018 12:54), Max: 97.6 (25 Jun 2018 12:54)  HR: 68 (25 Jun 2018 12:54) (60 - 73)  BP: 119/61 (25 Jun 2018 12:54) (105/55 - 119/61)  BP(mean): --  RR: 20 (25 Jun 2018 12:54) (18 - 20)  SpO2: --    LABS:                        11.7   5.29  )-----------( 198      ( 25 Jun 2018 07:24 )             35.6     06-25    141  |  103  |  12  ----------------------------<  87  3.9   |  26  |  0.8    Ca    8.7      25 Jun 2018 07:24  Mg     1.9     06-25    TPro  6.2  /  Alb  3.7  /  TBili  0.5  /  DBili  x   /  AST  25  /  ALT  10  /  AlkPhos  32  06-25    PT/INR - ( 23 Jun 2018 18:05 )   PT: 11.50 sec;   INR: 1.06 ratio         PTT - ( 23 Jun 2018 18:05 )  PTT:40.4 sec      Sedimentation Rate, Erythrocyte: 8 mm/hr (06-25-18 @ 07:24)          RADIOLOGY:      PHYSICAL EXAM:    GENERAL: NAD, well-developed, AAOx3  HEENT: Left jaw vry marcos to touch / couldnot open mouth ( assessed pre-op)  PULMONARY: Clear to auscultation bilaterally; No wheeze  CARDIOVASCULAR: Regular rate and rhythm; No murmurs, rubs, or gallops  GASTROINTESTINAL: Soft, Nontender, Nondistended; Bowel sounds present  MUSCULOSKELETAL:  2+ Peripheral Pulses, No clubbing, cyanosis, or edema  NEUROLOGY: non-focal  SKIN: No rashes or lesions

## 2018-06-25 NOTE — PROGRESS NOTE ADULT - SUBJECTIVE AND OBJECTIVE BOX
WALKER COHEN  22y  [facility] F6-4C [address]      Patient is a 22y old  Female who presents with a chief complaint of left jaw swelling and pain of 4 days (23 Jun 2018 23:33)      INTERVAL HPI/OVERNIGHT EVENTS:  no events overnight       REVIEW OF SYSTEMS:  CONSTITUTIONAL: No fever, weight loss, or fatigue  EYES: No eye pain, visual disturbances, or discharge  ENMT:  No difficulty hearing, tinnitus, vertigo; No sinus or throat pain, left face pain and tenderness   NECK: No pain or stiffness  BREASTS: No pain, masses, or nipple discharge  RESPIRATORY: No cough, wheezing, chills or hemoptysis; No shortness of breath  CARDIOVASCULAR: No chest pain, palpitations, dizziness, or leg swelling  GASTROINTESTINAL: No abdominal or epigastric pain. No nausea, vomiting, or hematemesis; No diarrhea or constipation. No melena or hematochezia.  GENITOURINARY: No dysuria, frequency, hematuria, or incontinence  NEUROLOGICAL: No headaches, memory loss, loss of strength, numbness, or tremors  SKIN: No itching, burning, rashes, or lesions   LYMPH NODES: No enlarged glands  ENDOCRINE: No heat or cold intolerance; No hair loss  MUSCULOSKELETAL: No joint pain or swelling; No muscle, back, or extremity pain  PSYCHIATRIC: No depression, anxiety, mood swings, or difficulty sleeping  HEME/LYMPH: No easy bruising, or bleeding gums  ALLERY AND IMMUNOLOGIC: No hives or eczema  FAMILY HISTORY:  Family history of diabetes mellitus (DM) (Father)  Family history of breast cancer (Mother)    T(C): 36.4 (06-25-18 @ 12:54), Max: 36.4 (06-25-18 @ 12:54)  HR: 68 (06-25-18 @ 12:54) (60 - 73)  BP: 119/61 (06-25-18 @ 12:54) (105/55 - 119/61)RR: 20 (  06-25-18 @ 12:54) (18 - 20)  SpO2: --  Wt(kg): --Vital Signs Last 24 Hrs  T(C): 36.4 (25 Jun 2018 12:54), Max: 36.4 (25 Jun 2018 12:54)  T(F): 97.6 (25 Jun 2018 12:54), Max: 97.6 (25 Jun 2018 12:54)  HR: 68 (25 Jun 2018 12:54) (60 - 73)  BP: 119/61 (25 Jun 2018 12:54) (105/55 - 119/61)  BP(mean): --  RR: 20 (25 Jun 2018 12:54) (18 - 20)  SpO2: --    PHYSICAL EXAM:  GENERAL: NAD, well-groomed, well-developed  HEAD:  Atraumatic, Normocephalic  EYES: EOMI, PERRLA, conjunctiva and sclera clear, edema of left face  ENMT: No tonsillar erythema, exudates, or enlargement; Moist mucous membranes, Good dentition, No lesions  NECK: Supple, No JVD, Normal thyroid  NERVOUS SYSTEM:  Alert & Oriented X3, Good concentration; Motor Strength 5/5 B/L upper and lower extremities; DTRs 2+ intact and symmetric  PULM: Clear to auscultation bilaterally  CARDIAC: Regular rate and rhythm; No murmurs, rubs, or gallops  GI: Soft, Nontender, Nondistended; Bowel sounds present  EXTREMITIES:  2+ Peripheral Pulses, No clubbing, cyanosis, or edema  LYMPH: No lymphadenopathy noted  SKIN: No rashes or lesions    Consultant(s) Notes Reviewed:  [x ] YES  [ ] NO  Care Discussed with Consultants/Other Providers [ x] YES  [ ] NO    LABS:                            11.7   5.29  )-----------( 198      ( 25 Jun 2018 07:24 )             35.6   06-25    141  |  103  |  12  ----------------------------<  87  3.9   |  26  |  0.8    Ca    8.7      25 Jun 2018 07:24  Mg     1.9     06-25    TPro  6.2  /  Alb  3.7  /  TBili  0.5  /  DBili  x   /  AST  25  /  ALT  10  /  AlkPhos  32  06-25            acetaminophen   Tablet. 650 milliGRAM(s) Oral every 6 hours PRN  cefTRIAXone   IVPB 1 Gram(s) IV Intermittent every 24 hours  cefTRIAXone   IVPB      ketorolac   Injectable 30 milliGRAM(s) IV Push three times a day PRN  metroNIDAZOLE  IVPB      metroNIDAZOLE  IVPB 500 milliGRAM(s) IV Intermittent every 8 hours  oxyCODONE    IR 5 milliGRAM(s) Oral two times a day PRN      HEALTH ISSUES - PROBLEM Dx:          Case Discussed with Jignesh Staff   45 minutes spent on total encounter; more than 50% of the visit was spent counseling and/or coordinating care by the attending physician.

## 2018-06-25 NOTE — PROGRESS NOTE ADULT - ASSESSMENT
ADMISSION SUMMARY  Patient is a 22y old Female who presents with a chief complaint of left jaw swelling and pain of 4 days (23 Jun 2018 23:33)  Currently admitted to medicine with the primary diagnosis of Dental abscess      ASSESSMENT & PLAN    1. DENTAL ABSCESS  Drained today  Continue IV Antibiotics for now.  Pain control  start soft diet  Assess pain and po tolerance tomorrow    2. Vaginal discharge  Resolved as per patient

## 2018-06-26 ENCOUNTER — TRANSCRIPTION ENCOUNTER (OUTPATIENT)
Age: 23
End: 2018-06-26

## 2018-06-26 VITALS
RESPIRATION RATE: 20 BRPM | DIASTOLIC BLOOD PRESSURE: 78 MMHG | TEMPERATURE: 99 F | SYSTOLIC BLOOD PRESSURE: 124 MMHG | HEART RATE: 91 BPM

## 2018-06-26 RX ORDER — ACETAMINOPHEN 500 MG
2 TABLET ORAL
Qty: 0 | Refills: 0 | DISCHARGE
Start: 2018-06-26

## 2018-06-26 RX ORDER — AMOXICILLIN 250 MG/5ML
500 SUSPENSION, RECONSTITUTED, ORAL (ML) ORAL
Qty: 10500 | Refills: 0
Start: 2018-06-26 | End: 2018-07-02

## 2018-06-26 RX ADMIN — Medication 100 MILLIGRAM(S): at 05:18

## 2018-06-26 RX ADMIN — OXYCODONE HYDROCHLORIDE 5 MILLIGRAM(S): 5 TABLET ORAL at 00:40

## 2018-06-26 NOTE — DISCHARGE NOTE ADULT - MEDICATION SUMMARY - MEDICATIONS TO TAKE
I will START or STAY ON the medications listed below when I get home from the hospital:    acetaminophen 325 mg oral tablet  -- 2 tab(s) by mouth every 6 hours, As needed, Mild Pain (1 - 3)  -- Indication: For DENTAL ABSCESS I will START or STAY ON the medications listed below when I get home from the hospital:    acetaminophen 325 mg oral tablet  -- 2 tab(s) by mouth every 6 hours, As needed, Mild Pain (1 - 3)  -- Indication: For DENTAL ABSCESS    amoxicillin 500 mg oral tablet  -- 500 tab(s) by mouth 3 times a day   -- Finish all this medication unless otherwise directed by prescriber.    -- Indication: For DENTAL ABSCESS

## 2018-06-26 NOTE — DISCHARGE NOTE ADULT - MEDICATION SUMMARY - MEDICATIONS TO STOP TAKING
I will STOP taking the medications listed below when I get home from the hospital:    Macrobid 100 mg oral capsule  -- 1 cap(s) by mouth 2 times a day MDD:2  -- Finish all this medication unless otherwise directed by prescriber.  May discolor urine or feces.  Take with food or milk.

## 2018-06-26 NOTE — DISCHARGE NOTE ADULT - ADDITIONAL INSTRUCTIONS
Follow up Appoitnment made for [facility] clinic [address] [facility] July 5, 2018 at 830 AM

## 2018-06-26 NOTE — DISCHARGE NOTE ADULT - CARE PROVIDER_API CALL
Armani Downey), Hospitalists  [address] [place] [address] [address]  [place] [address]  Phone: [phone]  Fax: [phone]

## 2018-06-26 NOTE — DISCHARGE NOTE ADULT - CARE PLAN
Principal Discharge DX:	Dental abscess  Goal:	treat infection  Assessment and plan of treatment:	incised and drained, received IV antibiotics  Continue PO amoxicillin for 1 week

## 2018-06-26 NOTE — DISCHARGE NOTE ADULT - PATIENT PORTAL LINK FT
You can access the [facility] Portal, offered by [facility], by registering with the following website: [email or web address]

## 2018-06-26 NOTE — DISCHARGE NOTE ADULT - REASON FOR ADMISSION
left jaw swelling and pain of 4 days left jaw swelling and pain of 4 days  diagnosed as dental abbscess

## 2018-06-26 NOTE — DISCHARGE NOTE ADULT - HOSPITAL COURSE
23 yo female patient with no pertinent past medical history, presented because of worsening left jaw swelling and pain over the last 4 days.  She also started recently having yellowish vaginal discharge, associated with vaginal and lower abdominal pain and itchiness. STDs negative.  Received IV rocephin and Flagyl. Denral abscess incised aand draines. Post op tolerating PO soft diet with no pain.

## 2018-06-28 ENCOUNTER — OTHER (OUTPATIENT)
Age: 23
End: 2018-06-28

## 2018-07-02 DIAGNOSIS — R22.0 LOCALIZED SWELLING, MASS AND LUMP, HEAD: ICD-10-CM

## 2018-07-02 DIAGNOSIS — K04.7 PERIAPICAL ABSCESS WITHOUT SINUS: ICD-10-CM

## 2018-07-02 DIAGNOSIS — Z87.891 PERSONAL HISTORY OF NICOTINE DEPENDENCE: ICD-10-CM

## 2018-07-02 DIAGNOSIS — F12.90 CANNABIS USE, UNSPECIFIED, UNCOMPLICATED: ICD-10-CM

## 2018-07-02 DIAGNOSIS — N76.0 ACUTE VAGINITIS: ICD-10-CM

## 2018-07-02 DIAGNOSIS — D64.9 ANEMIA, UNSPECIFIED: ICD-10-CM

## 2018-07-05 ENCOUNTER — APPOINTMENT (OUTPATIENT)
Dept: INTERNAL MEDICINE | Facility: CLINIC | Age: 23
End: 2018-07-05

## 2018-08-23 ENCOUNTER — EMERGENCY (EMERGENCY)
Facility: HOSPITAL | Age: 23
LOS: 0 days | Discharge: HOME | End: 2018-08-23
Admitting: PHYSICIAN ASSISTANT

## 2018-08-23 VITALS — WEIGHT: 125.66 LBS | HEIGHT: 64 IN

## 2018-08-23 VITALS
TEMPERATURE: 97 F | SYSTOLIC BLOOD PRESSURE: 112 MMHG | OXYGEN SATURATION: 98 % | DIASTOLIC BLOOD PRESSURE: 57 MMHG | HEART RATE: 65 BPM | RESPIRATION RATE: 18 BRPM

## 2018-08-23 DIAGNOSIS — N76.0 ACUTE VAGINITIS: ICD-10-CM

## 2018-08-23 DIAGNOSIS — N89.8 OTHER SPECIFIED NONINFLAMMATORY DISORDERS OF VAGINA: ICD-10-CM

## 2018-08-23 RX ORDER — CEFTRIAXONE 500 MG/1
250 INJECTION, POWDER, FOR SOLUTION INTRAMUSCULAR; INTRAVENOUS ONCE
Qty: 0 | Refills: 0 | Status: COMPLETED | OUTPATIENT
Start: 2018-08-23 | End: 2018-08-23

## 2018-08-23 RX ORDER — AZITHROMYCIN 500 MG/1
1000 TABLET, FILM COATED ORAL ONCE
Qty: 0 | Refills: 0 | Status: COMPLETED | OUTPATIENT
Start: 2018-08-23 | End: 2018-08-23

## 2018-08-23 RX ADMIN — CEFTRIAXONE 250 MILLIGRAM(S): 500 INJECTION, POWDER, FOR SOLUTION INTRAMUSCULAR; INTRAVENOUS at 13:45

## 2018-08-23 RX ADMIN — AZITHROMYCIN 1000 MILLIGRAM(S): 500 TABLET, FILM COATED ORAL at 13:45

## 2018-08-23 NOTE — ED PROVIDER NOTE - GENITOURINARY, MLM
Chaperone BRAD Falcon: no rash, (+) white vaginal d/c, cx obtained, no CMT, no palpable uterine or adnexal masses
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
## 2018-08-23 NOTE — ED ADULT NURSE NOTE - NSIMPLEMENTINTERV_GEN_ALL_ED
Implemented All Universal Safety Interventions:  Velpen to call system. Call bell, personal items and telephone within reach. Instruct patient to call for assistance. Room bathroom lighting operational. Non-slip footwear when patient is off stretcher. Physically safe environment: no spills, clutter or unnecessary equipment. Stretcher in lowest position, wheels locked, appropriate side rails in place.

## 2018-08-23 NOTE — ED PROVIDER NOTE - MEDICAL DECISION MAKING DETAILS
follow up with ob/gyn; counseled on safer sex practices; any new or worsening symptoms, pt will return to ER follow up with ob/gyn; counseled on safer sex practices; any new or worsening symptoms, pt will return to ER  Note complete

## 2018-08-24 LAB
C TRACH RRNA SPEC QL NAA+PROBE: SIGNIFICANT CHANGE UP
CULTURE RESULTS: SIGNIFICANT CHANGE UP
N GONORRHOEA RRNA SPEC QL NAA+PROBE: SIGNIFICANT CHANGE UP
SPECIMEN SOURCE: SIGNIFICANT CHANGE UP
SPECIMEN SOURCE: SIGNIFICANT CHANGE UP

## 2018-09-05 ENCOUNTER — EMERGENCY (EMERGENCY)
Facility: HOSPITAL | Age: 23
LOS: 0 days | Discharge: HOME | End: 2018-09-05
Admitting: PHYSICIAN ASSISTANT

## 2018-09-05 VITALS
DIASTOLIC BLOOD PRESSURE: 65 MMHG | OXYGEN SATURATION: 99 % | SYSTOLIC BLOOD PRESSURE: 111 MMHG | RESPIRATION RATE: 18 BRPM | HEART RATE: 80 BPM | TEMPERATURE: 98 F

## 2018-09-05 DIAGNOSIS — Z79.2 LONG TERM (CURRENT) USE OF ANTIBIOTICS: ICD-10-CM

## 2018-09-05 DIAGNOSIS — Z79.899 OTHER LONG TERM (CURRENT) DRUG THERAPY: ICD-10-CM

## 2018-09-05 DIAGNOSIS — N89.8 OTHER SPECIFIED NONINFLAMMATORY DISORDERS OF VAGINA: ICD-10-CM

## 2018-09-05 DIAGNOSIS — N76.0 ACUTE VAGINITIS: ICD-10-CM

## 2018-09-05 RX ORDER — CEFTRIAXONE 500 MG/1
250 INJECTION, POWDER, FOR SOLUTION INTRAMUSCULAR; INTRAVENOUS ONCE
Qty: 0 | Refills: 0 | Status: COMPLETED | OUTPATIENT
Start: 2018-09-05 | End: 2018-09-05

## 2018-09-05 RX ORDER — AZITHROMYCIN 500 MG/1
1000 TABLET, FILM COATED ORAL ONCE
Qty: 0 | Refills: 0 | Status: COMPLETED | OUTPATIENT
Start: 2018-09-05 | End: 2018-09-05

## 2018-09-05 RX ADMIN — CEFTRIAXONE 250 MILLIGRAM(S): 500 INJECTION, POWDER, FOR SOLUTION INTRAMUSCULAR; INTRAVENOUS at 13:41

## 2018-09-05 RX ADMIN — AZITHROMYCIN 1000 MILLIGRAM(S): 500 TABLET, FILM COATED ORAL at 13:41

## 2018-09-05 NOTE — ED PROVIDER NOTE - OBJECTIVE STATEMENT
22 y.o. female presented to the ER c/o vaginal d/c for the past 3 weeks.  Pt denies fever, chills, dysuria, rash, abnormal vaginal bleeding, pelvic/abdominal  pain. 22 y.o. female presented to the ER c/o vaginal d/c for the past week.  Pt denies fever, chills, dysuria, rash, abnormal vaginal bleeding, pelvic/abdominal pain.  LMP 3 weeks ago normal.  No other complaints.

## 2018-09-05 NOTE — ED PROVIDER NOTE - MEDICAL DECISION MAKING DETAILS
cx sent; counseled on safer sex practices; follow up with GYN scheduled; any new or worsening symptoms, pt will return to ER cx sent; counseled on safer sex practices; follow up with GYN scheduled; any new or worsening symptoms, pt will return to ER  Note complete

## 2018-09-05 NOTE — ED PROVIDER NOTE - GENITOURINARY, MLM
Chaperone: BRAD Gant (+) White thin vaginal d/c, no rash, no lesions, no adnexal or uterine masses, no CMT

## 2018-09-06 LAB
C TRACH RRNA SPEC QL NAA+PROBE: SIGNIFICANT CHANGE UP
N GONORRHOEA RRNA SPEC QL NAA+PROBE: SIGNIFICANT CHANGE UP
SPECIMEN SOURCE: SIGNIFICANT CHANGE UP

## 2018-10-08 ENCOUNTER — EMERGENCY (EMERGENCY)
Facility: HOSPITAL | Age: 23
LOS: 0 days | Discharge: HOME | End: 2018-10-08
Attending: EMERGENCY MEDICINE | Admitting: EMERGENCY MEDICINE

## 2018-10-08 VITALS
DIASTOLIC BLOOD PRESSURE: 60 MMHG | SYSTOLIC BLOOD PRESSURE: 110 MMHG | TEMPERATURE: 97 F | RESPIRATION RATE: 18 BRPM | HEART RATE: 60 BPM | OXYGEN SATURATION: 100 %

## 2018-10-08 VITALS
RESPIRATION RATE: 20 BRPM | HEART RATE: 55 BPM | TEMPERATURE: 97 F | DIASTOLIC BLOOD PRESSURE: 116 MMHG | SYSTOLIC BLOOD PRESSURE: 116 MMHG | OXYGEN SATURATION: 99 %

## 2018-10-08 DIAGNOSIS — O98.311 OTHER INFECTIONS WITH A PREDOMINANTLY SEXUAL MODE OF TRANSMISSION COMPLICATING PREGNANCY, FIRST TRIMESTER: ICD-10-CM

## 2018-10-08 DIAGNOSIS — Z34.90 ENCOUNTER FOR SUPERVISION OF NORMAL PREGNANCY, UNSPECIFIED, UNSPECIFIED TRIMESTER: ICD-10-CM

## 2018-10-08 DIAGNOSIS — R10.31 RIGHT LOWER QUADRANT PAIN: ICD-10-CM

## 2018-10-08 DIAGNOSIS — Z79.2 LONG TERM (CURRENT) USE OF ANTIBIOTICS: ICD-10-CM

## 2018-10-08 DIAGNOSIS — O20.8 OTHER HEMORRHAGE IN EARLY PREGNANCY: ICD-10-CM

## 2018-10-08 DIAGNOSIS — N94.9 UNSPECIFIED CONDITION ASSOCIATED WITH FEMALE GENITAL ORGANS AND MENSTRUAL CYCLE: ICD-10-CM

## 2018-10-08 DIAGNOSIS — Z79.899 OTHER LONG TERM (CURRENT) DRUG THERAPY: ICD-10-CM

## 2018-10-08 LAB
ALBUMIN SERPL ELPH-MCNC: 4.6 G/DL — SIGNIFICANT CHANGE UP (ref 3.5–5.2)
ALP SERPL-CCNC: 38 U/L — SIGNIFICANT CHANGE UP (ref 30–115)
ALT FLD-CCNC: 11 U/L — SIGNIFICANT CHANGE UP (ref 0–41)
ANION GAP SERPL CALC-SCNC: 14 MMOL/L — SIGNIFICANT CHANGE UP (ref 7–14)
APPEARANCE UR: ABNORMAL
AST SERPL-CCNC: 17 U/L — SIGNIFICANT CHANGE UP (ref 0–41)
BASOPHILS # BLD AUTO: 0.04 K/UL — SIGNIFICANT CHANGE UP (ref 0–0.2)
BASOPHILS NFR BLD AUTO: 0.7 % — SIGNIFICANT CHANGE UP (ref 0–1)
BILIRUB DIRECT SERPL-MCNC: <0.2 MG/DL — SIGNIFICANT CHANGE UP (ref 0–0.2)
BILIRUB INDIRECT FLD-MCNC: >0 MG/DL — LOW (ref 0.2–1.2)
BILIRUB SERPL-MCNC: 0.2 MG/DL — SIGNIFICANT CHANGE UP (ref 0.2–1.2)
BILIRUB SERPL-MCNC: 0.2 MG/DL — SIGNIFICANT CHANGE UP (ref 0.2–1.2)
BILIRUB UR-MCNC: NEGATIVE — SIGNIFICANT CHANGE UP
BLD GP AB SCN SERPL QL: SIGNIFICANT CHANGE UP
BUN SERPL-MCNC: 15 MG/DL — SIGNIFICANT CHANGE UP (ref 10–20)
CALCIUM SERPL-MCNC: 9.2 MG/DL — SIGNIFICANT CHANGE UP (ref 8.5–10.1)
CHLORIDE SERPL-SCNC: 100 MMOL/L — SIGNIFICANT CHANGE UP (ref 98–110)
CO2 SERPL-SCNC: 24 MMOL/L — SIGNIFICANT CHANGE UP (ref 17–32)
COLOR SPEC: YELLOW — SIGNIFICANT CHANGE UP
CREAT SERPL-MCNC: 0.7 MG/DL — SIGNIFICANT CHANGE UP (ref 0.7–1.5)
DIFF PNL FLD: ABNORMAL
EOSINOPHIL # BLD AUTO: 0.09 K/UL — SIGNIFICANT CHANGE UP (ref 0–0.7)
EOSINOPHIL NFR BLD AUTO: 1.6 % — SIGNIFICANT CHANGE UP (ref 0–8)
EPI CELLS # UR: ABNORMAL /HPF
GLUCOSE SERPL-MCNC: 92 MG/DL — SIGNIFICANT CHANGE UP (ref 70–99)
GLUCOSE UR QL: NEGATIVE MG/DL — SIGNIFICANT CHANGE UP
HCG SERPL-ACNC: 2182 MIU/ML — HIGH
HCT VFR BLD CALC: 38.1 % — SIGNIFICANT CHANGE UP (ref 37–47)
HGB BLD-MCNC: 12.4 G/DL — SIGNIFICANT CHANGE UP (ref 12–16)
IMM GRANULOCYTES NFR BLD AUTO: 0.2 % — SIGNIFICANT CHANGE UP (ref 0.1–0.3)
KETONES UR-MCNC: NEGATIVE — SIGNIFICANT CHANGE UP
LACTATE SERPL-SCNC: 0.8 MMOL/L — SIGNIFICANT CHANGE UP (ref 0.5–2.2)
LEUKOCYTE ESTERASE UR-ACNC: ABNORMAL
LIDOCAIN IGE QN: 44 U/L — SIGNIFICANT CHANGE UP (ref 7–60)
LYMPHOCYTES # BLD AUTO: 1.95 K/UL — SIGNIFICANT CHANGE UP (ref 1.2–3.4)
LYMPHOCYTES # BLD AUTO: 35.3 % — SIGNIFICANT CHANGE UP (ref 20.5–51.1)
MCHC RBC-ENTMCNC: 27.3 PG — SIGNIFICANT CHANGE UP (ref 27–31)
MCHC RBC-ENTMCNC: 32.5 G/DL — SIGNIFICANT CHANGE UP (ref 32–37)
MCV RBC AUTO: 83.9 FL — SIGNIFICANT CHANGE UP (ref 81–99)
MONOCYTES # BLD AUTO: 0.67 K/UL — HIGH (ref 0.1–0.6)
MONOCYTES NFR BLD AUTO: 12.1 % — HIGH (ref 1.7–9.3)
NEUTROPHILS # BLD AUTO: 2.77 K/UL — SIGNIFICANT CHANGE UP (ref 1.4–6.5)
NEUTROPHILS NFR BLD AUTO: 50.1 % — SIGNIFICANT CHANGE UP (ref 42.2–75.2)
NITRITE UR-MCNC: NEGATIVE — SIGNIFICANT CHANGE UP
NRBC # BLD: 0 /100 WBCS — SIGNIFICANT CHANGE UP (ref 0–0)
PH UR: 6 — SIGNIFICANT CHANGE UP (ref 5–8)
PLATELET # BLD AUTO: 219 K/UL — SIGNIFICANT CHANGE UP (ref 130–400)
POTASSIUM SERPL-MCNC: 4.4 MMOL/L — SIGNIFICANT CHANGE UP (ref 3.5–5)
POTASSIUM SERPL-SCNC: 4.4 MMOL/L — SIGNIFICANT CHANGE UP (ref 3.5–5)
PROT SERPL-MCNC: 7.1 G/DL — SIGNIFICANT CHANGE UP (ref 6–8)
PROT UR-MCNC: NEGATIVE MG/DL — SIGNIFICANT CHANGE UP
RBC # BLD: 4.54 M/UL — SIGNIFICANT CHANGE UP (ref 4.2–5.4)
RBC # FLD: 13 % — SIGNIFICANT CHANGE UP (ref 11.5–14.5)
RBC CASTS # UR COMP ASSIST: ABNORMAL /HPF
SODIUM SERPL-SCNC: 138 MMOL/L — SIGNIFICANT CHANGE UP (ref 135–146)
SP GR SPEC: 1.02 — SIGNIFICANT CHANGE UP (ref 1.01–1.03)
TYPE + AB SCN PNL BLD: SIGNIFICANT CHANGE UP
UROBILINOGEN FLD QL: 0.2 MG/DL — SIGNIFICANT CHANGE UP (ref 0.2–0.2)
WBC # BLD: 5.53 K/UL — SIGNIFICANT CHANGE UP (ref 4.8–10.8)
WBC # FLD AUTO: 5.53 K/UL — SIGNIFICANT CHANGE UP (ref 4.8–10.8)
WBC UR QL: ABNORMAL /HPF

## 2018-10-08 RX ORDER — AZITHROMYCIN 500 MG/1
250 TABLET, FILM COATED ORAL ONCE
Qty: 0 | Refills: 0 | Status: COMPLETED | OUTPATIENT
Start: 2018-10-08 | End: 2018-10-08

## 2018-10-08 RX ORDER — CEFTRIAXONE 500 MG/1
250 INJECTION, POWDER, FOR SOLUTION INTRAMUSCULAR; INTRAVENOUS ONCE
Qty: 0 | Refills: 0 | Status: COMPLETED | OUTPATIENT
Start: 2018-10-08 | End: 2018-10-08

## 2018-10-08 RX ADMIN — CEFTRIAXONE 250 MILLIGRAM(S): 500 INJECTION, POWDER, FOR SOLUTION INTRAMUSCULAR; INTRAVENOUS at 17:44

## 2018-10-08 RX ADMIN — AZITHROMYCIN 250 MILLIGRAM(S): 500 TABLET, FILM COATED ORAL at 17:44
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
## 2018-10-08 NOTE — ED PROVIDER NOTE - PROGRESS NOTE DETAILS
Discussed with obgyn will tx gonorrhea/chlamydia and have pt follow up as outpatient to repeat bhcg in 2 days. Discussed with obgyn will tx gonorrhea/chlamydia and have pt follow up as outpatient to repeat bhcg in 2 days. Return precautions given.

## 2018-10-08 NOTE — CONSULT NOTE ADULT - ASSESSMENT
24 yo  at 3w2d by approximate LMP, with abdominal pain and vaginal spotting, pregnancy of unknown location, clinically and hemodynamically stable. 22 yo  at 8w6d by approximate LMP, with abdominal pain and vaginal spotting, pregnancy of unknown location, clinically and hemodynamically stable.

## 2018-10-08 NOTE — ED PROVIDER NOTE - CARE PLAN
Principal Discharge DX:	STI (sexually transmitted infection)  Secondary Diagnosis:	Vaginal bleeding during pregnancy

## 2018-10-08 NOTE — CONSULT NOTE ADULT - PROBLEM SELECTOR RECOMMENDATION 9
Strongly counseled pt on pregnancy of unknown location. Explained and offered laparoscopy as definitive diagnosis, however pt refused at this time. Explained risks of possible ectopic including rupture, bleeding, infection and death. Pt is asymptomatic at this time, agrees to follow up with serial bhcg. Script given for repeat bhcg in 48hrs (on 10/10). Precautions given.

## 2018-10-08 NOTE — CONSULT NOTE ADULT - PROBLEM SELECTOR RECOMMENDATION 2
-Pt was very concerned with vaginal burning and possibility of having gonorrhea and/or chlamydia. Noted to have leuks on UA.   -Will treat empirically for GC/CT  -Will follow up cultures GC/Ct and Ucx.       Discussed with Dr. Bolaños and Dr. Olivera

## 2018-10-08 NOTE — ED PROVIDER NOTE - PHYSICAL EXAMINATION
CONSTITUTIONAL: Well-appearing; well-nourished; in no apparent distress.   CARDIOVASCULAR: Normal S1, S2; no murmurs, rubs, or gallops.   RESPIRATORY: Normal chest excursion with respiration; breath sounds clear and equal bilaterally; no wheezes, rhonchi, or rales.  GI/: +RLQ/ suprapubic tenderness., no rebound or guarding. Normal bowel sounds; non-distended  Pelvic exam: No external abnormalities, vesicles, rashes. + thin white vaginal discharge. + blood in vaginal canal. No CMT. + b/l adnexal tenderness R>L  MS: No evidence of trauma or deformity. Non-tender to palpation. No CVA tenderness.   SKIN: Normal for age and race; warm; dry; good turgor; no apparent lesions or exudate.   NEURO/PSYCH: A & O x 4; grossly unremarkable. mood and manner are appropriate. Grooming and personal hygiene are appropriate.

## 2018-10-08 NOTE — CONSULT NOTE ADULT - SUBJECTIVE AND OBJECTIVE BOX
WALKER COHEN [id number]    HPI: 24 yo  at 3w2d by approximate LMP 9/15 (previous LMP ) presents to ED with abdominal pain and vaginal spotting. Pt states she has been having sharp, constant, 9/10 for the last 2 weeks, did not take medications, radiating to the mid abdomen, worse with intercourse (last intercourse yesterday), no alleviating factors. Reports vaginal spotting for the last day, noted only with wiping, no pads needed, which prompted her to come in. Pt also reports yellow-white vaginal discharge for the last 2 weeks, non foul-smelling. Pt found out she was pregnant today in ED. Reports regular menses however is unsure of exact LMP. Denies fevers, chills, headache, CP, SOB, N/V/D, dysuria. Last BM yesterday, normal. Last intercourse yesterday.     OBhx etopx2,  with D&C  GYNhx: denies hx of abnormal pap, fibroids or ovarian cysts. Reports hx of gonorrhea, chlamydia at 17yo, s/p treatment at that time. Most recent test 2018 , neg.  PMHx: denies  PSHx: D&C  Meds: none  Allergies: none  SHx: smokes marijuana once daiy  FHx: denies    Physical Exam:  T(F): 97 (08 Oct 2018 16:33), Max: 97 (08 Oct 2018 09:39)  HR: 60 (08 Oct 2018 16:33) (55 - 60)  BP: 101/51 (08 Oct 2018 16:33) (101/51 - 116/65)  RR: 18 (08 Oct 2018 16:33) (18 - 20)  SpO2: 98% (08 Oct 2018 16:33) (98% - 99%)    Gen      Labs                        12.4   5.53  )-----------( 219      ( 08 Oct 2018 10:20 )             38.1   10    138  |  100  |  15  ----------------------------<  92  4.4   |  24  |  0.7    Ca    9.2      08 Oct 2018 10:20    TPro  7.1  /  Alb  4.6  /  TBili  0.2  /  DBili  <0.2  /  AST  17  /  ALT  11  /  AlkPhos  38  10-08  Urinalysis Basic - ( 08 Oct 2018 10:20 )    Color: Yellow / Appearance: Cloudy / S.025 / pH: x  Gluc: x / Ketone: Negative  / Bili: Negative / Urobili: 0.2 mg/dL   Blood: x / Protein: Negative mg/dL / Nitrite: Negative   Leuk Esterase: Large / RBC: 5-10 /HPF / WBC 10-25 /HPF   Sq Epi: x / Non Sq Epi: Occasional /HPF / Bacteria: x    [facility] 2182  O pos    Ucx, GC/Ct pending    Meds  none    Imaging  < from: US Transvaginal (10.08.18 @ 14:10) >  FINDINGS:    UTERUS: Anteverted uterus measuring 8.7 cm in length x 5.9 cm transverse   x 4.6 cm AP, with normal echogenicity and morphology. The endometrial   echo complex measures 1.3 cm, which is normal in thickness.     RIGHT OVARY: measures 3.3 x 2.6 x 2.5 cm, and is unremarkable. Doppler   flow is demonstrated to the right ovary.     LEFT OVARY: measures 4.4 x 4.0 x 2.2 cm, and contains a hemorrhagic cyst   measuring 2.0 x 1.8 x 1.4 cm. Doppler flow is demonstrated to the left   ovary.     OTHER: No free fluid in the pelvis.      IMPRESSION:    Left ovarian hemorrhagic cyst measuring approximately 2 cm.    Otherwise unremarkable exam. Specifically, there is no evidence of   intrauterine gestation. Follow-up beta-hCG levels is recommended.    < end of copied text >      Assessment    Plan WALKER COHEN [id number]    HPI: 22 yo  at 8w6d, approximate LMP 9/15 (previous LMP ) presents to ED with abdominal pain and vaginal spotting. Pt states she has been having sharp, constant, 9/10 for the last 2 weeks, did not take medications, radiating to the mid abdomen, worse with intercourse (last intercourse yesterday), no alleviating factors. Reports vaginal spotting for the last day, noted only with wiping, no pads needed, which prompted her to come in. Pt also reports yellow-white vaginal discharge for the last 2 weeks, non foul-smelling. Pt found out she was pregnant today in ED. Reports regular menses however is unsure of exact LMP. Denies fevers, chills, headache, CP, SOB, N/V/D, dysuria. Last BM yesterday, normal. Last intercourse yesterday.     At this time, pt states she has no abdominal pain. Reports vaginal burning only, no dysuria or hematuria,    OBhx etopx2,  with D&C  GYNhx: denies hx of abnormal pap, fibroids or ovarian cysts. Reports hx of gonorrhea, chlamydia at 17yo, s/p treatment at that time. Most recent test 2018 , neg.  PMHx: denies  PSHx: D&C  Meds: none  Allergies: none  SHx: smokes marijuana once daiy  FHx: denies    Physical Exam:  T(F): 97 (08 Oct 2018 16:33), Max: 97 (08 Oct 2018 09:39)  HR: 60 (08 Oct 2018 16:33) (55 - 60)  BP: 101/51 (08 Oct 2018 16:33) (101/51 - 116/65)  RR: 18 (08 Oct 2018 16:33) (18 - 20)  SpO2: 98% (08 Oct 2018 16:33) (98% - 99%)    Gen: NAD, AOOx3  CVS: RRR, normal S1, S2  Lungs: CTAB  Abd: soft, non distended, mild LLQ tenderness noted on deep palpation, no R/G/R, no CVA or suprapubic or CVA tenderness  Pelvic: normal external genitalia. Small amount of think white mucous/blood tinged discharge. No lesions noted. No active bleeding. Cervix closed, no CMT, uterus 8w in size, no fundal or adnexal masses or tenderness  LE: no edema or tenderness      Labs                        12.4   5.53  )-----------( 219      ( 08 Oct 2018 10:20 )             38.1   10-08    138  |  100  |  15  ----------------------------<  92  4.4   |  24  |  0.7    Ca    9.2      08 Oct 2018 10:20    TPro  7.1  /  Alb  4.6  /  TBili  0.2  /  DBili  <0.2  /  AST  17  /  ALT  11  /  AlkPhos  38  10-08  Urinalysis Basic - ( 08 Oct 2018 10:20 )    Color: Yellow / Appearance: Cloudy / S.025 / pH: x  Gluc: x / Ketone: Negative  / Bili: Negative / Urobili: 0.2 mg/dL   Blood: x / Protein: Negative mg/dL / Nitrite: Negative   Leuk Esterase: Large / RBC: 5-10 /HPF / WBC 10-25 /HPF   Sq Epi: x / Non Sq Epi: Occasional /HPF / Bacteria: x    [facility] 2182  O pos    Ucx, GC/Ct pending    Meds  none    Imaging  < from: US Transvaginal (10.08.18 @ 14:10) >  FINDINGS:    UTERUS: Anteverted uterus measuring 8.7 cm in length x 5.9 cm transverse   x 4.6 cm AP, with normal echogenicity and morphology. The endometrial   echo complex measures 1.3 cm, which is normal in thickness.     RIGHT OVARY: measures 3.3 x 2.6 x 2.5 cm, and is unremarkable. Doppler   flow is demonstrated to the right ovary.     LEFT OVARY: measures 4.4 x 4.0 x 2.2 cm, and contains a hemorrhagic cyst   measuring 2.0 x 1.8 x 1.4 cm. Doppler flow is demonstrated to the left   ovary.     OTHER: No free fluid in the pelvis.      IMPRESSION:    Left ovarian hemorrhagic cyst measuring approximately 2 cm.    Otherwise unremarkable exam. Specifically, there is no evidence of   intrauterine gestation. Follow-up beta-hCG levels is recommended.    < end of copied text >

## 2018-10-08 NOTE — ED PROVIDER NOTE - NS ED ROS FT
Constitutional: no fever, chills, no recent weight loss, change in appetite or malaise  Cardiac: No chest pain, SOB or edema.  Respiratory: No cough or respiratory distress  GI: + RLQ/suprapubic abdominal pain. No nausea, vomiting, diarrhea   : + vaginal burning/dysuria/pruritis, spotting  MS: no pain to back or extremities, no loss of ROM, no weakness  Neuro: No headache or weakness. No LOC.  Skin: No skin rash.  Except as documented in the HPI, all other systems are negative.

## 2018-10-12 ENCOUNTER — LABORATORY RESULT (OUTPATIENT)
Age: 23
End: 2018-10-12

## 2018-10-12 ENCOUNTER — OUTPATIENT (OUTPATIENT)
Dept: OUTPATIENT SERVICES | Facility: HOSPITAL | Age: 23
LOS: 1 days | Discharge: HOME | End: 2018-10-12

## 2018-10-12 DIAGNOSIS — Z34.90 ENCOUNTER FOR SUPERVISION OF NORMAL PREGNANCY, UNSPECIFIED, UNSPECIFIED TRIMESTER: ICD-10-CM

## 2018-10-18 ENCOUNTER — OUTPATIENT (OUTPATIENT)
Dept: OUTPATIENT SERVICES | Facility: HOSPITAL | Age: 23
LOS: 1 days | Discharge: HOME | End: 2018-10-18

## 2018-10-18 ENCOUNTER — APPOINTMENT (OUTPATIENT)
Dept: ANTEPARTUM | Facility: CLINIC | Age: 23
End: 2018-10-18

## 2018-10-21 ENCOUNTER — FORM ENCOUNTER (OUTPATIENT)
Age: 23
End: 2018-10-21

## 2018-10-22 ENCOUNTER — OUTPATIENT (OUTPATIENT)
Dept: OUTPATIENT SERVICES | Facility: HOSPITAL | Age: 23
LOS: 1 days | Discharge: HOME | End: 2018-10-22

## 2018-10-22 ENCOUNTER — APPOINTMENT (OUTPATIENT)
Dept: OBGYN | Facility: HOSPITAL | Age: 23
End: 2018-10-22

## 2018-10-22 DIAGNOSIS — Z30.09 ENCOUNTER FOR OTHER GENERAL COUNSELING AND ADVICE ON CONTRACEPTION: ICD-10-CM

## 2018-10-22 DIAGNOSIS — Z33.2 ENCOUNTER FOR ELECTIVE TERMINATION OF PREGNANCY: ICD-10-CM

## 2018-10-22 RX ORDER — DOXYCYCLINE HYCLATE 100 MG/1
100 CAPSULE ORAL
Qty: 10 | Refills: 0 | Status: ACTIVE | COMMUNITY
Start: 2018-10-22

## 2018-10-25 ENCOUNTER — OUTPATIENT (OUTPATIENT)
Dept: OUTPATIENT SERVICES | Facility: HOSPITAL | Age: 23
LOS: 1 days | Discharge: HOME | End: 2018-10-25

## 2018-10-25 ENCOUNTER — RESULT REVIEW (OUTPATIENT)
Age: 23
End: 2018-10-25

## 2018-10-25 VITALS — SYSTOLIC BLOOD PRESSURE: 105 MMHG | DIASTOLIC BLOOD PRESSURE: 66 MMHG | RESPIRATION RATE: 15 BRPM | HEART RATE: 70 BPM

## 2018-10-25 VITALS
HEART RATE: 69 BPM | DIASTOLIC BLOOD PRESSURE: 66 MMHG | OXYGEN SATURATION: 100 % | RESPIRATION RATE: 15 BRPM | TEMPERATURE: 96 F | HEIGHT: 62 IN | WEIGHT: 119.05 LBS | SYSTOLIC BLOOD PRESSURE: 116 MMHG

## 2018-10-25 DIAGNOSIS — Z98.890 OTHER SPECIFIED POSTPROCEDURAL STATES: Chronic | ICD-10-CM

## 2018-10-25 PROBLEM — A74.9 CHLAMYDIAL INFECTION, UNSPECIFIED: Chronic | Status: INACTIVE | Noted: 2018-10-08 | Resolved: 2018-10-24

## 2018-10-25 PROBLEM — A54.9 GONOCOCCAL INFECTION, UNSPECIFIED: Chronic | Status: INACTIVE | Noted: 2018-10-08 | Resolved: 2018-10-24

## 2018-10-25 RX ORDER — ONDANSETRON 8 MG/1
4 TABLET, FILM COATED ORAL ONCE
Qty: 0 | Refills: 0 | Status: DISCONTINUED | OUTPATIENT
Start: 2018-10-25 | End: 2018-10-25

## 2018-10-25 RX ORDER — SODIUM CHLORIDE 9 MG/ML
1000 INJECTION, SOLUTION INTRAVENOUS
Qty: 0 | Refills: 0 | Status: DISCONTINUED | OUTPATIENT
Start: 2018-10-25 | End: 2018-10-25

## 2018-10-25 RX ORDER — HYDROMORPHONE HYDROCHLORIDE 2 MG/ML
0.5 INJECTION INTRAMUSCULAR; INTRAVENOUS; SUBCUTANEOUS
Qty: 0 | Refills: 0 | Status: DISCONTINUED | OUTPATIENT
Start: 2018-10-25 | End: 2018-10-25

## 2018-10-25 NOTE — ASU DISCHARGE PLAN (ADULT/PEDIATRIC). - ASU FOLLOWUP
911 or go to the nearest Emergency Room [facility]:  Ambulatory Surgery [facility]...

## 2018-10-26 LAB — SURGICAL PATHOLOGY STUDY: SIGNIFICANT CHANGE UP

## 2018-10-30 DIAGNOSIS — Z33.2 ENCOUNTER FOR ELECTIVE TERMINATION OF PREGNANCY: ICD-10-CM

## 2018-11-12 ENCOUNTER — APPOINTMENT (OUTPATIENT)
Dept: OBGYN | Facility: HOSPITAL | Age: 23
End: 2018-11-12

## 2018-11-16 ENCOUNTER — APPOINTMENT (OUTPATIENT)
Dept: OBGYN | Facility: CLINIC | Age: 23
End: 2018-11-16

## 2018-12-01 ENCOUNTER — OUTPATIENT (OUTPATIENT)
Dept: OUTPATIENT SERVICES | Facility: HOSPITAL | Age: 23
LOS: 1 days | End: 2018-12-01
Payer: MEDICAID

## 2018-12-01 DIAGNOSIS — Z98.890 OTHER SPECIFIED POSTPROCEDURAL STATES: Chronic | ICD-10-CM

## 2018-12-14 ENCOUNTER — EMERGENCY (EMERGENCY)
Facility: HOSPITAL | Age: 23
LOS: 0 days | Discharge: HOME | End: 2018-12-14
Admitting: EMERGENCY MEDICAL TECHNICIAN, BASIC

## 2018-12-14 VITALS
RESPIRATION RATE: 18 BRPM | OXYGEN SATURATION: 100 % | DIASTOLIC BLOOD PRESSURE: 58 MMHG | HEART RATE: 81 BPM | SYSTOLIC BLOOD PRESSURE: 117 MMHG | TEMPERATURE: 96 F

## 2018-12-14 DIAGNOSIS — B37.3 CANDIDIASIS OF VULVA AND VAGINA: ICD-10-CM

## 2018-12-14 DIAGNOSIS — O03.9 COMPLETE OR UNSPECIFIED SPONTANEOUS ABORTION WITHOUT COMPLICATION: Chronic | ICD-10-CM

## 2018-12-14 DIAGNOSIS — R09.81 NASAL CONGESTION: ICD-10-CM

## 2018-12-14 DIAGNOSIS — R30.0 DYSURIA: ICD-10-CM

## 2018-12-14 DIAGNOSIS — Z79.899 OTHER LONG TERM (CURRENT) DRUG THERAPY: ICD-10-CM

## 2018-12-14 DIAGNOSIS — Z98.890 OTHER SPECIFIED POSTPROCEDURAL STATES: ICD-10-CM

## 2018-12-14 DIAGNOSIS — N93.9 ABNORMAL UTERINE AND VAGINAL BLEEDING, UNSPECIFIED: ICD-10-CM

## 2018-12-14 DIAGNOSIS — Z98.890 OTHER SPECIFIED POSTPROCEDURAL STATES: Chronic | ICD-10-CM

## 2018-12-14 DIAGNOSIS — Z11.3 ENCOUNTER FOR SCREENING FOR INFECTIONS WITH A PREDOMINANTLY SEXUAL MODE OF TRANSMISSION: ICD-10-CM

## 2018-12-14 DIAGNOSIS — Z79.2 LONG TERM (CURRENT) USE OF ANTIBIOTICS: ICD-10-CM

## 2018-12-14 DIAGNOSIS — F17.210 NICOTINE DEPENDENCE, CIGARETTES, UNCOMPLICATED: ICD-10-CM

## 2018-12-14 LAB — HIV 1 & 2 AB SERPL IA.RAPID: SIGNIFICANT CHANGE UP

## 2018-12-14 RX ORDER — AZITHROMYCIN 500 MG/1
1000 TABLET, FILM COATED ORAL ONCE
Qty: 0 | Refills: 0 | Status: COMPLETED | OUTPATIENT
Start: 2018-12-14 | End: 2018-12-14

## 2018-12-14 RX ORDER — FLUCONAZOLE 150 MG/1
150 TABLET ORAL ONCE
Qty: 0 | Refills: 0 | Status: COMPLETED | OUTPATIENT
Start: 2018-12-14 | End: 2018-12-14

## 2018-12-14 RX ORDER — METRONIDAZOLE 500 MG
2000 TABLET ORAL ONCE
Qty: 0 | Refills: 0 | Status: COMPLETED | OUTPATIENT
Start: 2018-12-14 | End: 2018-12-14

## 2018-12-14 RX ORDER — METRONIDAZOLE 500 MG
1000 TABLET ORAL ONCE
Qty: 0 | Refills: 0 | Status: DISCONTINUED | OUTPATIENT
Start: 2018-12-14 | End: 2018-12-14

## 2018-12-14 RX ORDER — CEFTRIAXONE 500 MG/1
250 INJECTION, POWDER, FOR SOLUTION INTRAMUSCULAR; INTRAVENOUS ONCE
Qty: 0 | Refills: 0 | Status: COMPLETED | OUTPATIENT
Start: 2018-12-14 | End: 2018-12-14

## 2018-12-14 RX ADMIN — CEFTRIAXONE 250 MILLIGRAM(S): 500 INJECTION, POWDER, FOR SOLUTION INTRAMUSCULAR; INTRAVENOUS at 11:59

## 2018-12-14 RX ADMIN — FLUCONAZOLE 150 MILLIGRAM(S): 150 TABLET ORAL at 11:59

## 2018-12-14 RX ADMIN — AZITHROMYCIN 1000 MILLIGRAM(S): 500 TABLET, FILM COATED ORAL at 11:59

## 2018-12-14 RX ADMIN — Medication 2000 MILLIGRAM(S): at 11:59

## 2018-12-14 NOTE — ED PROVIDER NOTE - PHYSICAL EXAMINATION
GENERAL: Well-nourished, Well-developed. NAD.  ENMT: MMM. No pharyngeal erythema or exudates. Uvula midline.   Neck: Supple. No LAD.   CVS: Normal S1,S2. No murmurs appreciated on auscultation   RESP: Chest rise symmetrical with good expansion. Lungs clear to auscultation B/L. No wheezing, rales, or rhonchi auscultated.  GI: Normal auscultation of bowel sounds in all 4 quadrants. Soft, Nontender, Nondistended. No guarding or rebound tenderness. No CVAT B/L.  MSK: FROM of upper and lower extremities B/L.  Skin: Warm, Dry. No rashes or lesions   EXT: Radial pulses present B/L.   Neuro: AA&O x 3.  Sensation grossly intact. Strength 5/5 B/L. Gait within normal   Psych:  Appropriate mood and affect  GYN: + white, curd-like vaginal discharge in vaginal vault. External Genitalia unremarkable. Cervix pink. No visible blood in vaginal vault. No CMT. Chaperoned by: Sky Islas PA-C
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
## 2018-12-14 NOTE — ED PROVIDER NOTE - NSFOLLOWUPCLINICS_GEN_ALL_ED_FT
[facility]  OB/GYN  [address] [place] [address]  Phone: [phone]  Fax:   Follow Up Time:

## 2018-12-17 DIAGNOSIS — Z71.89 OTHER SPECIFIED COUNSELING: ICD-10-CM

## 2019-01-02 ENCOUNTER — EMERGENCY (EMERGENCY)
Facility: HOSPITAL | Age: 24
LOS: 0 days | Discharge: HOME | End: 2019-01-02
Admitting: PHYSICIAN ASSISTANT

## 2019-01-02 VITALS
DIASTOLIC BLOOD PRESSURE: 70 MMHG | SYSTOLIC BLOOD PRESSURE: 113 MMHG | HEART RATE: 96 BPM | TEMPERATURE: 97 F | RESPIRATION RATE: 16 BRPM | OXYGEN SATURATION: 100 %

## 2019-01-02 DIAGNOSIS — K13.0 DISEASES OF LIPS: ICD-10-CM

## 2019-01-02 DIAGNOSIS — Z11.3 ENCOUNTER FOR SCREENING FOR INFECTIONS WITH A PREDOMINANTLY SEXUAL MODE OF TRANSMISSION: ICD-10-CM

## 2019-01-02 DIAGNOSIS — Z98.890 OTHER SPECIFIED POSTPROCEDURAL STATES: Chronic | ICD-10-CM

## 2019-01-02 DIAGNOSIS — F17.200 NICOTINE DEPENDENCE, UNSPECIFIED, UNCOMPLICATED: ICD-10-CM

## 2019-01-02 DIAGNOSIS — Z79.2 LONG TERM (CURRENT) USE OF ANTIBIOTICS: ICD-10-CM

## 2019-01-02 DIAGNOSIS — O03.9 COMPLETE OR UNSPECIFIED SPONTANEOUS ABORTION WITHOUT COMPLICATION: Chronic | ICD-10-CM

## 2019-01-02 PROBLEM — A74.9 CHLAMYDIAL INFECTION, UNSPECIFIED: Chronic | Status: ACTIVE | Noted: 2018-12-14
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
## 2019-01-02 NOTE — ED PROVIDER NOTE - OBJECTIVE STATEMENT
23 y.o. female without any PMH presented to the ER c/o "sore" Left upper lip.  Denies fever, chills, d/c.  No h/o cold sores.  Pt requesting HIV screen and chlamydia screen.  Pt denies any rash or abnormal vaginal d/c.

## 2019-01-02 NOTE — ED PROVIDER NOTE - NSFOLLOWUPCLINICS_GEN_ALL_ED_FT
[facility]  Medicine  [address] [place], [place]   Phone: [phone]  Fax:   Follow Up Time:

## 2019-01-31 ENCOUNTER — APPOINTMENT (OUTPATIENT)
Dept: OBGYN | Facility: CLINIC | Age: 24
End: 2019-01-31

## 2019-02-13 DIAGNOSIS — Z76.89 PERSONS ENCOUNTERING HEALTH SERVICES IN OTHER SPECIFIED CIRCUMSTANCES: ICD-10-CM

## 2019-06-05 ENCOUNTER — EMERGENCY (EMERGENCY)
Facility: HOSPITAL | Age: 24
LOS: 0 days | Discharge: HOME | End: 2019-06-05
Attending: EMERGENCY MEDICINE | Admitting: EMERGENCY MEDICINE
Payer: COMMERCIAL

## 2019-06-05 VITALS
HEART RATE: 69 BPM | SYSTOLIC BLOOD PRESSURE: 110 MMHG | RESPIRATION RATE: 17 BRPM | OXYGEN SATURATION: 99 % | DIASTOLIC BLOOD PRESSURE: 61 MMHG | TEMPERATURE: 98 F

## 2019-06-05 VITALS
DIASTOLIC BLOOD PRESSURE: 55 MMHG | OXYGEN SATURATION: 100 % | SYSTOLIC BLOOD PRESSURE: 107 MMHG | TEMPERATURE: 97 F | HEART RATE: 54 BPM | RESPIRATION RATE: 16 BRPM

## 2019-06-05 DIAGNOSIS — M54.2 CERVICALGIA: ICD-10-CM

## 2019-06-05 DIAGNOSIS — Z23 ENCOUNTER FOR IMMUNIZATION: ICD-10-CM
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
## 2019-06-05 DIAGNOSIS — Z87.891 PERSONAL HISTORY OF NICOTINE DEPENDENCE: ICD-10-CM

## 2019-06-05 DIAGNOSIS — M25.512 PAIN IN LEFT SHOULDER: ICD-10-CM
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
## 2019-06-05 DIAGNOSIS — O03.9 COMPLETE OR UNSPECIFIED SPONTANEOUS ABORTION WITHOUT COMPLICATION: Chronic | ICD-10-CM

## 2019-06-05 DIAGNOSIS — Z98.890 OTHER SPECIFIED POSTPROCEDURAL STATES: Chronic | ICD-10-CM

## 2019-06-05 DIAGNOSIS — M79.652 PAIN IN LEFT THIGH: ICD-10-CM

## 2019-06-05 DIAGNOSIS — Z79.899 OTHER LONG TERM (CURRENT) DRUG THERAPY: ICD-10-CM

## 2019-06-05 LAB
ALBUMIN SERPL ELPH-MCNC: 4.4 G/DL — SIGNIFICANT CHANGE UP (ref 3.5–5.2)
ALP SERPL-CCNC: 45 U/L — SIGNIFICANT CHANGE UP (ref 30–115)
ALT FLD-CCNC: 10 U/L — SIGNIFICANT CHANGE UP (ref 0–41)
ANION GAP SERPL CALC-SCNC: 12 MMOL/L — SIGNIFICANT CHANGE UP (ref 7–14)
APPEARANCE UR: ABNORMAL
APTT BLD: 41.7 SEC — HIGH (ref 27–39.2)
AST SERPL-CCNC: 21 U/L — SIGNIFICANT CHANGE UP (ref 0–41)
BACTERIA # UR AUTO: ABNORMAL /HPF
BASOPHILS # BLD AUTO: 0.04 K/UL — SIGNIFICANT CHANGE UP (ref 0–0.2)
BASOPHILS NFR BLD AUTO: 0.8 % — SIGNIFICANT CHANGE UP (ref 0–1)
BILIRUB SERPL-MCNC: 0.5 MG/DL — SIGNIFICANT CHANGE UP (ref 0.2–1.2)
BILIRUB UR-MCNC: NEGATIVE — SIGNIFICANT CHANGE UP
BUN SERPL-MCNC: 18 MG/DL — SIGNIFICANT CHANGE UP (ref 10–20)
CALCIUM SERPL-MCNC: 9.4 MG/DL — SIGNIFICANT CHANGE UP (ref 8.5–10.1)
CHLORIDE SERPL-SCNC: 104 MMOL/L — SIGNIFICANT CHANGE UP (ref 98–110)
CO2 SERPL-SCNC: 25 MMOL/L — SIGNIFICANT CHANGE UP (ref 17–32)
COLOR SPEC: YELLOW — SIGNIFICANT CHANGE UP
CREAT SERPL-MCNC: 0.7 MG/DL — SIGNIFICANT CHANGE UP (ref 0.7–1.5)
DIFF PNL FLD: ABNORMAL
EOSINOPHIL # BLD AUTO: 0.07 K/UL — SIGNIFICANT CHANGE UP (ref 0–0.7)
EOSINOPHIL NFR BLD AUTO: 1.4 % — SIGNIFICANT CHANGE UP (ref 0–8)
EPI CELLS # UR: ABNORMAL /HPF
ETHANOL SERPL-MCNC: <10 MG/DL — SIGNIFICANT CHANGE UP
GLUCOSE SERPL-MCNC: 86 MG/DL — SIGNIFICANT CHANGE UP (ref 70–99)
GLUCOSE UR QL: NEGATIVE — SIGNIFICANT CHANGE UP
HCG SERPL-ACNC: <0.6 MIU/ML — SIGNIFICANT CHANGE UP
HCT VFR BLD CALC: 40.5 % — SIGNIFICANT CHANGE UP (ref 37–47)
HGB BLD-MCNC: 12.9 G/DL — SIGNIFICANT CHANGE UP (ref 12–16)
IMM GRANULOCYTES NFR BLD AUTO: 0.2 % — SIGNIFICANT CHANGE UP (ref 0.1–0.3)
INR BLD: 1.04 RATIO — SIGNIFICANT CHANGE UP (ref 0.65–1.3)
KETONES UR-MCNC: 40
LACTATE SERPL-SCNC: 1.2 MMOL/L — SIGNIFICANT CHANGE UP (ref 0.5–2.2)
LEUKOCYTE ESTERASE UR-ACNC: NEGATIVE — SIGNIFICANT CHANGE UP
LIDOCAIN IGE QN: 22 U/L — SIGNIFICANT CHANGE UP (ref 7–60)
LYMPHOCYTES # BLD AUTO: 2.21 K/UL — SIGNIFICANT CHANGE UP (ref 1.2–3.4)
LYMPHOCYTES # BLD AUTO: 45.2 % — SIGNIFICANT CHANGE UP (ref 20.5–51.1)
MCHC RBC-ENTMCNC: 27.4 PG — SIGNIFICANT CHANGE UP (ref 27–31)
MCHC RBC-ENTMCNC: 31.9 G/DL — LOW (ref 32–37)
MCV RBC AUTO: 86 FL — SIGNIFICANT CHANGE UP (ref 81–99)
MONOCYTES # BLD AUTO: 0.39 K/UL — SIGNIFICANT CHANGE UP (ref 0.1–0.6)
MONOCYTES NFR BLD AUTO: 8 % — SIGNIFICANT CHANGE UP (ref 1.7–9.3)
NEUTROPHILS # BLD AUTO: 2.17 K/UL — SIGNIFICANT CHANGE UP (ref 1.4–6.5)
NEUTROPHILS NFR BLD AUTO: 44.4 % — SIGNIFICANT CHANGE UP (ref 42.2–75.2)
NITRITE UR-MCNC: NEGATIVE — SIGNIFICANT CHANGE UP
NRBC # BLD: 0 /100 WBCS — SIGNIFICANT CHANGE UP (ref 0–0)
PH UR: 6 — SIGNIFICANT CHANGE UP (ref 5–8)
PLATELET # BLD AUTO: 229 K/UL — SIGNIFICANT CHANGE UP (ref 130–400)
POTASSIUM SERPL-MCNC: 4.3 MMOL/L — SIGNIFICANT CHANGE UP (ref 3.5–5)
POTASSIUM SERPL-SCNC: 4.3 MMOL/L — SIGNIFICANT CHANGE UP (ref 3.5–5)
PROT SERPL-MCNC: 7.7 G/DL — SIGNIFICANT CHANGE UP (ref 6–8)
PROT UR-MCNC: 30
PROTHROM AB SERPL-ACNC: 12 SEC — SIGNIFICANT CHANGE UP (ref 9.95–12.87)
RBC # BLD: 4.71 M/UL — SIGNIFICANT CHANGE UP (ref 4.2–5.4)
RBC # FLD: 13.2 % — SIGNIFICANT CHANGE UP (ref 11.5–14.5)
RBC CASTS # UR COMP ASSIST: ABNORMAL /HPF
SODIUM SERPL-SCNC: 141 MMOL/L — SIGNIFICANT CHANGE UP (ref 135–146)
SP GR SPEC: >=1.03 — SIGNIFICANT CHANGE UP (ref 1.01–1.03)
UROBILINOGEN FLD QL: 0.2 — SIGNIFICANT CHANGE UP (ref 0.2–0.2)
WBC # BLD: 4.89 K/UL — SIGNIFICANT CHANGE UP (ref 4.8–10.8)
WBC # FLD AUTO: 4.89 K/UL — SIGNIFICANT CHANGE UP (ref 4.8–10.8)

## 2019-06-05 PROCEDURE — 72125 CT NECK SPINE W/O DYE: CPT | Mod: 26

## 2019-06-05 PROCEDURE — 71045 X-RAY EXAM CHEST 1 VIEW: CPT | Mod: 26

## 2019-06-05 PROCEDURE — 73552 X-RAY EXAM OF FEMUR 2/>: CPT | Mod: 26,LT

## 2019-06-05 PROCEDURE — 73080 X-RAY EXAM OF ELBOW: CPT | Mod: 26,LT

## 2019-06-05 PROCEDURE — 72170 X-RAY EXAM OF PELVIS: CPT | Mod: 26

## 2019-06-05 PROCEDURE — 70450 CT HEAD/BRAIN W/O DYE: CPT | Mod: 26

## 2019-06-05 PROCEDURE — 93010 ELECTROCARDIOGRAM REPORT: CPT

## 2019-06-05 PROCEDURE — 73030 X-RAY EXAM OF SHOULDER: CPT | Mod: 26,LT

## 2019-06-05 PROCEDURE — 99284 EMERGENCY DEPT VISIT MOD MDM: CPT

## 2019-06-05 PROCEDURE — 71260 CT THORAX DX C+: CPT | Mod: 26

## 2019-06-05 PROCEDURE — 74177 CT ABD & PELVIS W/CONTRAST: CPT | Mod: 26

## 2019-06-05 RX ORDER — TETANUS TOXOID, REDUCED DIPHTHERIA TOXOID AND ACELLULAR PERTUSSIS VACCINE, ADSORBED 5; 2.5; 8; 8; 2.5 [IU]/.5ML; [IU]/.5ML; UG/.5ML; UG/.5ML; UG/.5ML
0.5 SUSPENSION INTRAMUSCULAR ONCE
Refills: 0 | Status: COMPLETED | OUTPATIENT
Start: 2019-06-05 | End: 2019-06-05

## 2019-06-05 RX ORDER — SODIUM CHLORIDE 9 MG/ML
1000 INJECTION INTRAMUSCULAR; INTRAVENOUS; SUBCUTANEOUS ONCE
Refills: 0 | Status: COMPLETED | OUTPATIENT
Start: 2019-06-05 | End: 2019-06-05

## 2019-06-05 RX ADMIN — SODIUM CHLORIDE 1000 MILLILITER(S): 9 INJECTION INTRAMUSCULAR; INTRAVENOUS; SUBCUTANEOUS at 12:26

## 2019-06-05 RX ADMIN — TETANUS TOXOID, REDUCED DIPHTHERIA TOXOID AND ACELLULAR PERTUSSIS VACCINE, ADSORBED 0.5 MILLILITER(S): 5; 2.5; 8; 8; 2.5 SUSPENSION INTRAMUSCULAR at 12:27

## 2019-06-05 RX ADMIN — SODIUM CHLORIDE 1000 MILLILITER(S): 9 INJECTION INTRAMUSCULAR; INTRAVENOUS; SUBCUTANEOUS at 13:30
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
## 2019-06-05 NOTE — ED PROVIDER NOTE - NSFOLLOWUPCLINICS_GEN_ALL_ED_FT
[facility]  Rehabilitation  [address] [place] [address]  Phone: [phone]  Fax:   Follow Up Time:
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
## 2019-06-05 NOTE — ED PROVIDER NOTE - NSFOLLOWUPINSTRUCTIONS_ED_ALL_ED_FT
Shoulder Pain    Many things can cause shoulder pain, including:    An injury to the area.  Overuse of the shoulder.  Arthritis.    The source of the pain can be:    Inflammation.  An injury to the shoulder joint.  An injury to a tendon, ligament, or bone.    HOME CARE INSTRUCTIONS  Take these actions to help with your pain:     Squeeze a soft ball or a foam pad as much as possible. This helps to keep the shoulder from swelling. It also helps to strengthen the arm.  Take over-the-counter and prescription medicines only as told by your health care provider.  If directed, apply ice to the area:  Put ice in a plastic bag.  Place a towel between your skin and the bag.  Leave the ice on for 20 minutes, 2–3 times per day. Stop applying ice if it does not help with the pain.  If you were given a shoulder sling or immobilizer:  Wear it as told.  Remove it to shower or bathe.  Move your arm as little as possible, but keep your hand moving to prevent swelling.    SEEK MEDICAL CARE IF:  Your pain gets worse.  Your pain is not relieved with medicines.  New pain develops in your arm, hand, or fingers.    SEEK IMMEDIATE MEDICAL CARE IF:  Your arm, hand, or fingers:  Tingle.  Become numb.  Become swollen.  Become painful.  Turn white or blue.    ADDITIONAL NOTES AND INSTRUCTIONS    Please follow up with your Primary MD in 24-48 hr.  Seek immediate medical care for any new/worsening signs or symptoms.
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
## 2019-06-05 NOTE — ED PROVIDER NOTE - NS ED ROS FT
Constitutional: (-) fever, (-) chills, (-) fatigue, (-) weakness  Eyes: (-) visual changes  Cardiovascular: (-) chest pain, (-) syncope  Respiratory: (-) cough, (-) shortness of breath, (-) dyspnea,   Gastrointestinal: (-) vomiting, (-) diarrhea, (-)nausea,  Musculoskeletal: (+) neck pain, (-) back pain, (+) left shoulder/thigh pain, (-)stiffness, (-)weakness,(-)limitation of movement  Integumentary: (-) rash, (-) edema, (-) bruises,   Neurological: (-) headache, (-)loc, (-) dizziness, (-) tingling, (-)numbness,  Peripheral Vascular: (-) pain while walking, (-) leg swelling, (-) color changes  : (-)dysuria, (-) hematuria  Allergic/Immunologic: (-) pruritus
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
## 2019-06-05 NOTE — ED PROVIDER NOTE - PHYSICAL EXAMINATION
Constitutional: Well developed, well nourished. NAD  TRAUMA: ABC intact. GCS 15. FAST negative.  Head: Normocephalic, atraumatic.  Eyes: PERRL. EOMI. No Raccoon eyes.   ENT: No nasal discharge. No septal hematoma. No Diamond sign. Mucous membranes moist.  Neck: Supple. Painless ROM.midline ttp without stepoffs.  Cardiovascular: Normal S1, S2. Regular rate and rhythm. No murmurs, rubs, or gallops.  Pulmonary: Normal respiratory rate and effort. Lungs clear to auscultation bilaterally. No wheezing, rales, or rhonchi.  CHEST: No chest wall tenderness or crepitus.  Abdominal: Soft. Nondistended. Nontender. No rebound, guarding, or rigidity.  BACK: No midline L/S tenderness or stepoffs. thorax midline ttp approx t6, No saddle paresthesia.  Extremities. Pelvis stable. No traumatic deformities or tenderness of extremities. ttp over left shoulder with normal rom.   Skin: No rashes, cyanosis, lacerations, or abrasions.  Neuro: AAOx3. Strength 5/5 in all extremities. Sensation intact throughout. No focal neurological deficits.  Psych: Normal mood. Normal affect.
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
## 2019-06-05 NOTE — ED PROVIDER NOTE - CARE PROVIDER_API CALL
Krishna Bueno (MD)  Orthopaedic Surgery  [address] [place] [address]  Phone: [phone]  Fax: [phone]  Follow Up Time: 1-3 Days

## 2019-07-21 ENCOUNTER — EMERGENCY (EMERGENCY)
Facility: HOSPITAL | Age: 24
LOS: 0 days | Discharge: HOME | End: 2019-07-21
Attending: EMERGENCY MEDICINE | Admitting: EMERGENCY MEDICINE
Payer: MEDICAID

## 2019-07-21 VITALS
DIASTOLIC BLOOD PRESSURE: 62 MMHG | TEMPERATURE: 96 F | RESPIRATION RATE: 16 BRPM | HEART RATE: 62 BPM | SYSTOLIC BLOOD PRESSURE: 108 MMHG | OXYGEN SATURATION: 98 %

## 2019-07-21 DIAGNOSIS — R10.32 LEFT LOWER QUADRANT PAIN: ICD-10-CM

## 2019-07-21 DIAGNOSIS — O26.899 OTHER SPECIFIED PREGNANCY RELATED CONDITIONS, UNSPECIFIED TRIMESTER: ICD-10-CM

## 2019-07-21 DIAGNOSIS — Z79.2 LONG TERM (CURRENT) USE OF ANTIBIOTICS: ICD-10-CM

## 2019-07-21 DIAGNOSIS — Z98.890 OTHER SPECIFIED POSTPROCEDURAL STATES: Chronic | ICD-10-CM

## 2019-07-21 DIAGNOSIS — N89.8 OTHER SPECIFIED NONINFLAMMATORY DISORDERS OF VAGINA: ICD-10-CM

## 2019-07-21 DIAGNOSIS — Z79.899 OTHER LONG TERM (CURRENT) DRUG THERAPY: ICD-10-CM

## 2019-07-21 DIAGNOSIS — O03.9 COMPLETE OR UNSPECIFIED SPONTANEOUS ABORTION WITHOUT COMPLICATION: Chronic | ICD-10-CM

## 2019-07-21 PROBLEM — F90.9 ATTENTION-DEFICIT HYPERACTIVITY DISORDER, UNSPECIFIED TYPE: Chronic | Status: ACTIVE | Noted: 2019-06-05

## 2019-07-21 LAB
ANION GAP SERPL CALC-SCNC: 13 MMOL/L — SIGNIFICANT CHANGE UP (ref 7–14)
APPEARANCE UR: CLEAR — SIGNIFICANT CHANGE UP
BASOPHILS # BLD AUTO: 0.03 K/UL — SIGNIFICANT CHANGE UP (ref 0–0.2)
BASOPHILS NFR BLD AUTO: 0.6 % — SIGNIFICANT CHANGE UP (ref 0–1)
BILIRUB UR-MCNC: NEGATIVE — SIGNIFICANT CHANGE UP
BLD GP AB SCN SERPL QL: SIGNIFICANT CHANGE UP
BUN SERPL-MCNC: 17 MG/DL — SIGNIFICANT CHANGE UP (ref 10–20)
CALCIUM SERPL-MCNC: 9.3 MG/DL — SIGNIFICANT CHANGE UP (ref 8.5–10.1)
CHLORIDE SERPL-SCNC: 100 MMOL/L — SIGNIFICANT CHANGE UP (ref 98–110)
CO2 SERPL-SCNC: 24 MMOL/L — SIGNIFICANT CHANGE UP (ref 17–32)
COLOR SPEC: SIGNIFICANT CHANGE UP
CREAT SERPL-MCNC: 0.7 MG/DL — SIGNIFICANT CHANGE UP (ref 0.7–1.5)
DIFF PNL FLD: NEGATIVE — SIGNIFICANT CHANGE UP
EOSINOPHIL # BLD AUTO: 0.13 K/UL — SIGNIFICANT CHANGE UP (ref 0–0.7)
EOSINOPHIL NFR BLD AUTO: 2.8 % — SIGNIFICANT CHANGE UP (ref 0–8)
GLUCOSE SERPL-MCNC: 87 MG/DL — SIGNIFICANT CHANGE UP (ref 70–99)
GLUCOSE UR QL: NEGATIVE — SIGNIFICANT CHANGE UP
HCG SERPL-ACNC: 2477 MIU/ML — HIGH
HCT VFR BLD CALC: 38.2 % — SIGNIFICANT CHANGE UP (ref 37–47)
HGB BLD-MCNC: 12.4 G/DL — SIGNIFICANT CHANGE UP (ref 12–16)
IMM GRANULOCYTES NFR BLD AUTO: 0.2 % — SIGNIFICANT CHANGE UP (ref 0.1–0.3)
KETONES UR-MCNC: NEGATIVE — SIGNIFICANT CHANGE UP
LEUKOCYTE ESTERASE UR-ACNC: NEGATIVE — SIGNIFICANT CHANGE UP
LYMPHOCYTES # BLD AUTO: 1.97 K/UL — SIGNIFICANT CHANGE UP (ref 1.2–3.4)
LYMPHOCYTES # BLD AUTO: 41.7 % — SIGNIFICANT CHANGE UP (ref 20.5–51.1)
MCHC RBC-ENTMCNC: 27.7 PG — SIGNIFICANT CHANGE UP (ref 27–31)
MCHC RBC-ENTMCNC: 32.5 G/DL — SIGNIFICANT CHANGE UP (ref 32–37)
MCV RBC AUTO: 85.3 FL — SIGNIFICANT CHANGE UP (ref 81–99)
MONOCYTES # BLD AUTO: 0.58 K/UL — SIGNIFICANT CHANGE UP (ref 0.1–0.6)
MONOCYTES NFR BLD AUTO: 12.3 % — HIGH (ref 1.7–9.3)
NEUTROPHILS # BLD AUTO: 2 K/UL — SIGNIFICANT CHANGE UP (ref 1.4–6.5)
NEUTROPHILS NFR BLD AUTO: 42.4 % — SIGNIFICANT CHANGE UP (ref 42.2–75.2)
NITRITE UR-MCNC: NEGATIVE — SIGNIFICANT CHANGE UP
NRBC # BLD: 0 /100 WBCS — SIGNIFICANT CHANGE UP (ref 0–0)
PH UR: 6.5 — SIGNIFICANT CHANGE UP (ref 5–8)
PLATELET # BLD AUTO: 206 K/UL — SIGNIFICANT CHANGE UP (ref 130–400)
POTASSIUM SERPL-MCNC: 3.9 MMOL/L — SIGNIFICANT CHANGE UP (ref 3.5–5)
POTASSIUM SERPL-SCNC: 3.9 MMOL/L — SIGNIFICANT CHANGE UP (ref 3.5–5)
PROT UR-MCNC: SIGNIFICANT CHANGE UP
RBC # BLD: 4.48 M/UL — SIGNIFICANT CHANGE UP (ref 4.2–5.4)
RBC # FLD: 12.8 % — SIGNIFICANT CHANGE UP (ref 11.5–14.5)
SODIUM SERPL-SCNC: 137 MMOL/L — SIGNIFICANT CHANGE UP (ref 135–146)
SP GR SPEC: 1.02 — SIGNIFICANT CHANGE UP (ref 1.01–1.02)
UROBILINOGEN FLD QL: SIGNIFICANT CHANGE UP
WBC # BLD: 4.72 K/UL — LOW (ref 4.8–10.8)
WBC # FLD AUTO: 4.72 K/UL — LOW (ref 4.8–10.8)

## 2019-07-21 PROCEDURE — 99283 EMERGENCY DEPT VISIT LOW MDM: CPT

## 2019-07-21 PROCEDURE — 76830 TRANSVAGINAL US NON-OB: CPT | Mod: 26

## 2019-07-21 PROCEDURE — 99284 EMERGENCY DEPT VISIT MOD MDM: CPT

## 2019-07-21 RX ORDER — SODIUM CHLORIDE 9 MG/ML
3 INJECTION INTRAMUSCULAR; INTRAVENOUS; SUBCUTANEOUS EVERY 8 HOURS
Refills: 0 | Status: DISCONTINUED | OUTPATIENT
Start: 2019-07-21 | End: 2019-07-21

## 2019-07-21 RX ADMIN — SODIUM CHLORIDE 3 MILLILITER(S): 9 INJECTION INTRAMUSCULAR; INTRAVENOUS; SUBCUTANEOUS at 10:38

## 2019-07-21 NOTE — ED PROVIDER NOTE - NSFOLLOWUPINSTRUCTIONS_ED_ALL_ED_FT
Pregnancy    WHAT YOU NEED TO KNOW:    A normal pregnancy lasts about 40 weeks. The first trimester lasts from your last period through the 12th week of pregnancy. The second trimester lasts from the 13th week of your pregnancy through the 23rd week. The third trimester lasts from your 24th week of pregnancy until your baby is born. If you know the date of your last period, your healthcare provider can estimate your due date. You may give birth to your baby any time from 37 weeks to 2 weeks after your due date.    DISCHARGE INSTRUCTIONS:    Return to the emergency department if:     You develop a severe headache that does not go away.      You have new or increased vision changes, such as blurred or spotted vision.      You have new or increased swelling in your face or hands.      You have pain or cramping in your abdomen or low back.      You have vaginal bleeding.    Contact your healthcare provider or obstetrician if:     You have abdominal cramps, pressure, or tightening.      You have a change in vaginal discharge.      You cannot keep food or drinks down, and you are losing weight.      You have chills or a fever.      You have vaginal itching, burning, or pain.       You have yellow, green, white, or foul-smelling vaginal discharge.      You have pain or burning when you urinate, less urine than usual, or pink or bloody urine.      You have questions or concerns about your condition or care.    Medicines:     Prenatal vitamins provide some of the extra vitamins and minerals you need during pregnancy. Prenatal vitamins may also help to decrease the risk of certain birth defects.       Take your medicine as directed. Contact your healthcare provider if you think your medicine is not helping or if you have side effects. Tell him or her if you are allergic to any medicine. Keep a list of the medicines, vitamins, and herbs you take. Include the amounts, and when and why you take them. Bring the list or the pill bottles to follow-up visits. Carry your medicine list with you in case of an emergency.    Follow up with your healthcare provider or obstetrician as directed: Go to all of your prenatal visits during your pregnancy. Write down your questions so you remember to ask them during your visits.    Body changes that may occur during your pregnancy:     Breast changes you will experience include tenderness and tingling during the early part of your pregnancy. Your breasts will become larger. You may need to use a support bra. You may see a thin, yellow fluid, called colostrum, leak from your nipples during the second trimester. Colostrum is a liquid that changes to milk about 3 days after you give birth.      Skin changes and stretch marks may occur during your pregnancy. You may have red marks, called stretch marks, on your skin. Stretch marks will usually fade after pregnancy. Use lotion if your skin is dry and itchy. The skin on your face, around your nipples, and below your belly button may darken. Most of the time, your skin will return to its normal color after your baby is born.       Morning sickness is nausea and vomiting that can happen at any time of day. Avoid fatty and spicy foods. Eat small meals throughout the day instead of large meals. Jessie may help to decrease nausea. Ask your healthcare provider about other ways of decreasing nausea and vomiting.      Heartburn may be caused by changes in your hormones during pregnancy. Your growing uterus may also push your stomach upward and force stomach acid to back up into your esophagus. Eat 4 or 5 small meals each day instead of large meals. Avoid spicy foods. Avoid eating right before bedtime.      Constipation may develop during your pregnancy. To treat constipation, eat foods high in fiber such as fiber cereals, beans, fruits, vegetables, whole-grain breads, and prune juice. Get regular exercise and drink plenty of water. Your healthcare provider may also suggest a fiber supplement to soften your bowel movements. Talk to your healthcare provider before you use any medicines to decrease constipation.      Hemorrhoids are enlarged veins in the rectal area. They may cause pain, itching, and bright red bleeding from your rectum. To decrease your risk of hemorrhoids, prevent constipation and do not strain to have a bowel movement. If you have hemorrhoids, soak in a tub of warm water to ease discomfort. Ask your healthcare provider how you can treat hemorrhoids.       Leg cramps and swelling may be caused by low calcium levels or the added weight of pregnancy. Raise your legs above the level of your heart to decrease swelling. During a leg cramp, stretch or massage the muscle that has the cramp. Heat may help decrease pain and muscle spasms. Apply heat on your muscle for 20 to 30 minutes every 2 hours for as many days as directed.      Back pain may occur as your baby grows. Do not stand for long periods of time or lift heavy items. Use good posture while you stand, squat, or bend. Wear low-heeled shoes with good support. Rest may also help to relieve back pain. Ask your healthcare provider about exercises you can do to strengthen your back muscles.     Stay healthy during your pregnancy:     Eat a variety of healthy foods. Healthy foods include fruits, vegetables, whole-grain breads, low-fat dairy foods, beans, lean meats, and fish. Drink liquids as directed. Ask how much liquid to drink each day and which liquids are best for you. Limit caffeine to less than 200 milligrams each day. Limit your intake of fish to 2 servings each week. Choose fish low in mercury such as canned light tuna, shrimp, crab, salmon, cod, or tilapia. Do not eat fish high in mercury such as swordfish, tilefish, ruben mackerel, and shark.       Take prenatal vitamins as directed. Your need for certain vitamins and minerals, such as folic acid, increases during pregnancy. Prenatal vitamins provide some of the extra vitamins and minerals you need. Prenatal vitamins may also help to decrease the risk of certain birth defects.       Ask how much weight you should gain during your pregnancy. Too much or too little weight gain can be unhealthy for you and your baby.       Talk to your healthcare provider about exercise. Moderate exercise can help you stay fit. Your healthcare provider will help you plan an exercise program that is safe for you during pregnancy.       Do not smoke. Smoking increases your risk of a miscarriage and other health problems during your pregnancy. Smoking can cause your baby to be born too early or weigh less at birth. Quit smoking as soon as you think you might be pregnant. Ask your healthcare provider for information if you need help quitting.      Do not drink alcohol. Alcohol passes from your body to your baby through the placenta. It can affect your baby's brain development and cause fetal alcohol syndrome (FAS). FAS is a group of conditions that causes mental, behavior, and growth problems.       Talk to your healthcare provider before you take any medicines. Many medicines may harm your baby if you take them when you are pregnant. Do not take any medicines, vitamins, herbs, or supplements without first talking to your healthcare provider. Never use illegal or street drugs (such as marijuana or cocaine) while you are pregnant.     Safety tips:     Avoid hot tubs and saunas. Do not use a hot tub or sauna while you are pregnant, especially during your first trimester. Hot tubs and saunas may raise your baby's temperature and increase the risk of birth defects.      Avoid toxoplasmosis. This is an infection caused by eating raw meat or being around infected cat feces. It can cause birth defects, miscarriages, and other problems. Wash your hands after you touch raw meat. Make sure any meat is well-cooked before you eat it. Avoid raw eggs and unpasteurized milk. Use gloves or ask someone else to clean your cat's litter box while you are pregnant.       Ask your healthcare provider about travel. The most comfortable time to travel is during the second trimester. Ask your healthcare provider if you can travel after 36 weeks. You may not be able to travel in an airplane after 36 weeks. He may also recommend that you avoid long road trips.

## 2019-07-21 NOTE — CONSULT NOTE ADULT - SUBJECTIVE AND OBJECTIVE BOX
Chief complaint: abdominal pain  HPI: 23y  at 5w2d by LMP 19, presents to the ED for lower abdominal pain. Patient took a positive pregnancy test 2 days ago and presents today for lower abdominal pain that started 3 days ago, radiating to the LLQ, sharp, intermittent, positional, 8/10 when it occurs. Denies alleviating to aggravating factors. Also reports creamy yellow discharge, foul smelling for 3 days. Denies fevers, chills, N/V, chest pain, SOB, vaginal bleeding, extremity pain or swelling, dysuria, constipation, diarrhea. Unplanned but desired pregnancy.    Denies the following: constitutional symptoms, visual symptoms, cardiovascular symptoms, respiratory symptoms, GI symptoms, musculoskeletal symptoms, skin symptoms, neurologic symptoms, hematologic symptoms, allergic symptoms, psychiatric symptoms  Except any pertinent positives listed.     Ob/Gyn History:                   LMP                    Cycle Length: qmonth, lasts for 4 days, 4 pads per day  etopx4, d&cx4    Reports hx of chlamydia and gonorrhea 6 months ago (treated)  Denies history of ovarian cysts, uterine fibroids, abnormal paps, or other STIs  Last Pap Smear 2 years ago - does not recall results    PAST MEDICAL & SURGICAL HISTORY:  ADHD  Chlamydia  : 2018  History of surgery: TERMINATION X 4  ,,   Asthma uses albuterol inhaler PRN, last use 2-3 years ago. Never hospitalized or intubated.      Home Medications:  acetaminophen 325 mg oral tablet: 2 tab(s) orally every 6 hours, As needed, Mild Pain (1 - 3) (25 Oct 2018 11:26)      Allergies No Known Allergies    FAMILY HISTORY:  Family history of diabetes mellitus (DM) (Father)  Family history of breast cancer (Mother at age 33 and aunt at age 40)    SOCIAL HISTORY: Marijuana daily. Denies cigarette use, alcohol use, or illicit drug use.    Vital Signs Last 24 Hrs  T(F): 96.1 (2019 07:42), Max: 96.1 (2019 07:42)  HR: 62 (2019 07:42) (62 - 62)  BP: 108/62 (2019 07:42) (108/62 - 108/62)  RR: 16 (2019 07:42) (16 - 16)    General Appearance - AAOx3, NAD  Heart - S1S2 regular rate and rhythm  Lung - CTA Bilaterally  Abdomen - Soft, mild tenderness on deep palpation in the left lower quadrant, nondistended, no rebound, no rigidity, no guarding.     GYN/Pelvis:  External genitalia appears normal  Speculum: cervix appears closed. No discharge or active bleeding noted. No blood in the vagina noted. No lesions seen  Bimanual: cervix closed. No CMT. uterus anteverted, normal in size, non-tender, no adnexal tenderness or masses.      LABS:                        12.4   4.72  )-----------( 206      ( 2019 08:01 )             38.2     HCG Quantitative, Serum: 2477.0 mIU/mL (19 @ 08:01)    ABO RH Interpretation: O POS (19 @ 08:01)  Antibody Screen: NEG (19 @ 08:01)        137  |  100  |  17  ----------------------------<  87  3.9   |  24  |  0.7    Ca    9.3      2019 08:01        Urinalysis Basic - ( 2019 07:58 )    Color: Light Yellow / Appearance: Clear / S.018 / pH: x  Gluc: x / Ketone: Negative  / Bili: Negative / Urobili: <2 mg/dL   Blood: x / Protein: Trace / Nitrite: Negative   Leuk Esterase: Negative / RBC: x / WBC x   Sq Epi: x / Non Sq Epi: x / Bacteria: x        RADIOLOGY & ADDITIONAL STUDIES:  EXAM:  US TRANSVAGINAL          PROCEDURE DATE:  2019      INTERPRETATION:  CLINICAL HISTORY: Abdominal pain. Positive pregnancy   test.  COMPARISON: 10/22/2018.  PROCEDURE: Transvaginal ultrasound of the pelvis was performed, including   Doppler.  LMP: 2019  FINDINGS:  UTERUS: Measures 8.8 x 7.2 x 4.9 cm with heterogeneous echogenicity.   There is a 0.3 cm anechoic structure within the fundus of the uterus   possibly reflecting early gestational sac. No fetal pole is identified.   There is a cervical nabothian cyst.  RIGHT OVARY: measures 3.8 x 2.5 x 2.2 cm, and is unremarkable. Doppler   flow is demonstrated to the right ovary.   LEFT OVARY: measures 3.8 x 3.2 x 2.8 cm, and contains a 2.7 cm   hemorrhagic cyst. Doppler flow is demonstrated to the left ovary.   OTHER: Small volume free fluid in the pelvis.      IMPRESSION:  Anechoic structure measuring 0.3 cm within the uterus which possibly   reflects an early gestational sac. No fetal pole identified, which may be   secondary to early imaging. Continued follow-up with beta hCG and   sonography is recommended.  Left ovarian 2.7 cm hemorrhagic cyst.  Small amount of free fluid in pelvis

## 2019-07-21 NOTE — ED PROVIDER NOTE - ATTENDING CONTRIBUTION TO CARE
left lower quad pain for two weeks. + preg. . yellow vag discharge. No back pain. On exam S1S2 rrr, lungs clear, abdomen-mild left suprapubic tenderness, no CVA tenderness.

## 2019-07-21 NOTE — ED PROVIDER NOTE - NSFOLLOWUPCLINICS_GEN_ALL_ED_FT
[facility]  OB/GYN  [address] [place] [address]  Phone: [phone]  Fax:   Follow Up Time:

## 2019-07-21 NOTE — CONSULT NOTE ADULT - ASSESSMENT
24yo  at 5w2d by LMP , with lower abdominal pain, with early IUP vs abnormal IUP cannot rule out ectopic pregnancy, no acute abdomen, clinically and hemodynamically stable    -Repeat betaHCG and progesterone on - Scripts given  -Bleeding and pain precautions given  -Tylenol PRN  -Dispo per ED  -f/u vaginitis panel      Dr. Boggs and Dr Price aware

## 2019-07-21 NOTE — ED PROVIDER NOTE - OBJECTIVE STATEMENT
22 y/o female  LMP  presents to the ED c/o "I think I'm pregnant and I'm worried I might have an STD. I have yellow itchy discharge and lower abdominal pain for 2 weeks. I have one partner and we don't use protection." no fever/ chills/ dysuria/ n/v/d

## 2019-07-21 NOTE — ED PROVIDER NOTE - PROGRESS NOTE DETAILS
Case discussed with gyn will evaluate patient in ED. Patient evaluated by GYN repeat BHCG 2 days F/U GYN clinic.

## 2019-07-23 ENCOUNTER — LABORATORY RESULT (OUTPATIENT)
Age: 24
End: 2019-07-23

## 2019-07-23 ENCOUNTER — OUTPATIENT (OUTPATIENT)
Dept: OUTPATIENT SERVICES | Facility: HOSPITAL | Age: 24
LOS: 1 days | Discharge: HOME | End: 2019-07-23

## 2019-07-23 DIAGNOSIS — O03.9 COMPLETE OR UNSPECIFIED SPONTANEOUS ABORTION WITHOUT COMPLICATION: Chronic | ICD-10-CM

## 2019-07-23 DIAGNOSIS — Z98.890 OTHER SPECIFIED POSTPROCEDURAL STATES: Chronic | ICD-10-CM

## 2019-07-23 DIAGNOSIS — Z34.00 ENCOUNTER FOR SUPERVISION OF NORMAL FIRST PREGNANCY, UNSPECIFIED TRIMESTER: ICD-10-CM

## 2019-07-25 ENCOUNTER — APPOINTMENT (OUTPATIENT)
Dept: ANTEPARTUM | Facility: CLINIC | Age: 24
End: 2019-07-25
Payer: MEDICARE

## 2019-07-25 ENCOUNTER — OUTPATIENT (OUTPATIENT)
Dept: OUTPATIENT SERVICES | Facility: HOSPITAL | Age: 24
LOS: 1 days | Discharge: HOME | End: 2019-07-25

## 2019-07-25 DIAGNOSIS — Z98.890 OTHER SPECIFIED POSTPROCEDURAL STATES: Chronic | ICD-10-CM

## 2019-07-25 DIAGNOSIS — O03.9 COMPLETE OR UNSPECIFIED SPONTANEOUS ABORTION WITHOUT COMPLICATION: Chronic | ICD-10-CM

## 2019-07-25 LAB
A VAGINAE DNA VAG QL NAA+PROBE: ABNORMAL
BVAB2 DNA VAG QL NAA+PROBE: ABNORMAL
C ALBICANS DNA VAG QL NAA+PROBE: NEGATIVE — SIGNIFICANT CHANGE UP
C GLABRATA DNA VAG QL NAA+PROBE: NEGATIVE — SIGNIFICANT CHANGE UP
C KRUSEI DNA VAG QL NAA+PROBE: NEGATIVE — SIGNIFICANT CHANGE UP
C LUSITANIAE DNA VAG QL NAA+PROBE: NEGATIVE — SIGNIFICANT CHANGE UP
C PARAP DNA VAG QL NAA+PROBE: NEGATIVE — SIGNIFICANT CHANGE UP
C TRACH RRNA SPEC QL NAA+PROBE: NEGATIVE — SIGNIFICANT CHANGE UP
C TROPICLS DNA VAG QL NAA+PROBE: NEGATIVE — SIGNIFICANT CHANGE UP
MEGA1 DNA VAG QL NAA+PROBE: ABNORMAL
N GONORRHOEA RRNA SPEC QL NAA+PROBE: NEGATIVE — SIGNIFICANT CHANGE UP
T VAGINALIS RRNA SPEC QL NAA+PROBE: POSITIVE

## 2019-07-25 PROCEDURE — 76801 OB US < 14 WKS SINGLE FETUS: CPT | Mod: 26

## 2019-07-25 PROCEDURE — 76817 TRANSVAGINAL US OBSTETRIC: CPT | Mod: 26

## 2019-07-25 RX ORDER — METRONIDAZOLE 500 MG
4 TABLET ORAL
Qty: 4 | Refills: 1
Start: 2019-07-25

## 2019-07-25 NOTE — CHART NOTE - NSCHARTNOTEFT_GEN_A_CORE
Mrs. Leyva presented to the ED     HPI: 23y  at 5w2d by LMP 19, presented to the ED with lower abdominal pain that started 3 days ago, radiating to the LLQ, sharp, intermittent, positional, 8/10 when it occurred. Patient took a positive pregnancy test 2 days prior. Denied alleviating or aggravating factors. Also reported creamy yellow discharge, foul smelling for past 3 days. Denied fevers, chills, N/V, chest pain, SOB, vaginal bleeding, extremity pain or swelling, dysuria, constipation, diarrhea. Unplanned but desired pregnancy.    Labs:  : 4.72>12.4/38.2<206, 137/3.9/100/24/17/0.7<87, HCG 2477, O pos, UA negative, vaginitis panel: positive for trichomoniasis    : 6807    Imagin/21: UTERUS: Measures 8.8 x 7.2 x 4.9 cm with heterogeneous echogenicity. There is a 0.3 cm anechoic structure within the fundus of the uterus possibly reflecting early gestational sac. No fetal pole is identified. There is a cervical nabothian cyst. RIGHT OVARY: measures 3.8 x 2.5 x 2.2 cm, and is unremarkable. Doppler flow is demonstrated to the right ovary. LEFT OVARY: measures 3.8 x 3.2 x 2.8 cm, and contains a 2.7 cm hemorrhagic cyst. Doppler flow is demonstrated to the left ovary. OTHER: Small volume free fluid in the pelvis.     GS, YS    Ddx initially early IUP vs abnormal IUP cannot rule out ectopic    Pt repeated bhcg with appropriate rise. Vaginitis panel results positive for trichomonas, pt informed and treatment sent to pharmacy for pt and partner. Had sonogram  demonstrating GS w/ IUP. Pt now w/ proven IUP, GYN will no longer continue to follow    Dr. Hurt and Dr. Hale aware.

## 2019-07-26 NOTE — ED POST DISCHARGE NOTE - DETAILS
ON CHART REVIEW: PATIENT WAS SEEN IN CLINIC 7-25-19 AND FLAGYL 2 GRAMS SENT TO PHARMACY BY DR. MIRIAM MEMBRENO FOR + TRICHOMONAS. PATIENT HAS F/U AND TREATMENT.

## 2019-07-28 ENCOUNTER — EMERGENCY (EMERGENCY)
Facility: HOSPITAL | Age: 24
LOS: 0 days | Discharge: HOME | End: 2019-07-28
Attending: STUDENT IN AN ORGANIZED HEALTH CARE EDUCATION/TRAINING PROGRAM | Admitting: STUDENT IN AN ORGANIZED HEALTH CARE EDUCATION/TRAINING PROGRAM
Payer: MEDICAID

## 2019-07-28 VITALS
HEART RATE: 56 BPM | SYSTOLIC BLOOD PRESSURE: 106 MMHG | TEMPERATURE: 97 F | RESPIRATION RATE: 18 BRPM | DIASTOLIC BLOOD PRESSURE: 58 MMHG | OXYGEN SATURATION: 97 %

## 2019-07-28 VITALS — WEIGHT: 139.99 LBS | HEIGHT: 64 IN

## 2019-07-28 DIAGNOSIS — Z3A.01 LESS THAN 8 WEEKS GESTATION OF PREGNANCY: ICD-10-CM

## 2019-07-28 DIAGNOSIS — O03.9 COMPLETE OR UNSPECIFIED SPONTANEOUS ABORTION WITHOUT COMPLICATION: Chronic | ICD-10-CM

## 2019-07-28 DIAGNOSIS — O26.891 OTHER SPECIFIED PREGNANCY RELATED CONDITIONS, FIRST TRIMESTER: ICD-10-CM

## 2019-07-28 DIAGNOSIS — F17.200 NICOTINE DEPENDENCE, UNSPECIFIED, UNCOMPLICATED: ICD-10-CM

## 2019-07-28 DIAGNOSIS — Z98.890 OTHER SPECIFIED POSTPROCEDURAL STATES: Chronic | ICD-10-CM

## 2019-07-28 DIAGNOSIS — R11.0 NAUSEA: ICD-10-CM

## 2019-07-28 DIAGNOSIS — R10.30 LOWER ABDOMINAL PAIN, UNSPECIFIED: ICD-10-CM

## 2019-07-28 LAB
ALBUMIN SERPL ELPH-MCNC: 4.5 G/DL — SIGNIFICANT CHANGE UP (ref 3.5–5.2)
ALP SERPL-CCNC: 32 U/L — SIGNIFICANT CHANGE UP (ref 30–115)
ALT FLD-CCNC: 10 U/L — SIGNIFICANT CHANGE UP (ref 0–41)
ANION GAP SERPL CALC-SCNC: 14 MMOL/L — SIGNIFICANT CHANGE UP (ref 7–14)
APPEARANCE UR: ABNORMAL
AST SERPL-CCNC: 25 U/L — SIGNIFICANT CHANGE UP (ref 0–41)
BACTERIA # UR AUTO: ABNORMAL
BASOPHILS # BLD AUTO: 0.04 K/UL — SIGNIFICANT CHANGE UP (ref 0–0.2)
BASOPHILS NFR BLD AUTO: 0.8 % — SIGNIFICANT CHANGE UP (ref 0–1)
BILIRUB SERPL-MCNC: 0.6 MG/DL — SIGNIFICANT CHANGE UP (ref 0.2–1.2)
BILIRUB UR-MCNC: NEGATIVE — SIGNIFICANT CHANGE UP
BLD GP AB SCN SERPL QL: SIGNIFICANT CHANGE UP
BUN SERPL-MCNC: 13 MG/DL — SIGNIFICANT CHANGE UP (ref 10–20)
CALCIUM SERPL-MCNC: 9.4 MG/DL — SIGNIFICANT CHANGE UP (ref 8.5–10.1)
CHLORIDE SERPL-SCNC: 100 MMOL/L — SIGNIFICANT CHANGE UP (ref 98–110)
CO2 SERPL-SCNC: 24 MMOL/L — SIGNIFICANT CHANGE UP (ref 17–32)
COLOR SPEC: YELLOW — SIGNIFICANT CHANGE UP
CREAT SERPL-MCNC: 0.8 MG/DL — SIGNIFICANT CHANGE UP (ref 0.7–1.5)
DIFF PNL FLD: ABNORMAL
EOSINOPHIL # BLD AUTO: 0.09 K/UL — SIGNIFICANT CHANGE UP (ref 0–0.7)
EOSINOPHIL NFR BLD AUTO: 1.7 % — SIGNIFICANT CHANGE UP (ref 0–8)
EPI CELLS # UR: SIGNIFICANT CHANGE UP /HPF (ref 0–5)
GLUCOSE SERPL-MCNC: 93 MG/DL — SIGNIFICANT CHANGE UP (ref 70–99)
GLUCOSE UR QL: NEGATIVE — SIGNIFICANT CHANGE UP
HCT VFR BLD CALC: 41.9 % — SIGNIFICANT CHANGE UP (ref 37–47)
HGB BLD-MCNC: 13.6 G/DL — SIGNIFICANT CHANGE UP (ref 12–16)
HYALINE CASTS # UR AUTO: NEGATIVE /LPF — SIGNIFICANT CHANGE UP (ref 0–7)
IMM GRANULOCYTES NFR BLD AUTO: 0.2 % — SIGNIFICANT CHANGE UP (ref 0.1–0.3)
KETONES UR-MCNC: ABNORMAL
LEUKOCYTE ESTERASE UR-ACNC: NEGATIVE — SIGNIFICANT CHANGE UP
LIDOCAIN IGE QN: 23 U/L — SIGNIFICANT CHANGE UP (ref 7–60)
LYMPHOCYTES # BLD AUTO: 1.88 K/UL — SIGNIFICANT CHANGE UP (ref 1.2–3.4)
LYMPHOCYTES # BLD AUTO: 36.1 % — SIGNIFICANT CHANGE UP (ref 20.5–51.1)
MCHC RBC-ENTMCNC: 27.4 PG — SIGNIFICANT CHANGE UP (ref 27–31)
MCHC RBC-ENTMCNC: 32.5 G/DL — SIGNIFICANT CHANGE UP (ref 32–37)
MCV RBC AUTO: 84.5 FL — SIGNIFICANT CHANGE UP (ref 81–99)
MONOCYTES # BLD AUTO: 0.6 K/UL — SIGNIFICANT CHANGE UP (ref 0.1–0.6)
MONOCYTES NFR BLD AUTO: 11.5 % — HIGH (ref 1.7–9.3)
NEUTROPHILS # BLD AUTO: 2.59 K/UL — SIGNIFICANT CHANGE UP (ref 1.4–6.5)
NEUTROPHILS NFR BLD AUTO: 49.7 % — SIGNIFICANT CHANGE UP (ref 42.2–75.2)
NITRITE UR-MCNC: NEGATIVE — SIGNIFICANT CHANGE UP
NRBC # BLD: 0 /100 WBCS — SIGNIFICANT CHANGE UP (ref 0–0)
PH UR: 6.5 — SIGNIFICANT CHANGE UP (ref 5–8)
PLATELET # BLD AUTO: 222 K/UL — SIGNIFICANT CHANGE UP (ref 130–400)
POTASSIUM SERPL-MCNC: 5.3 MMOL/L — HIGH (ref 3.5–5)
POTASSIUM SERPL-SCNC: 5.3 MMOL/L — HIGH (ref 3.5–5)
PROT SERPL-MCNC: 7.7 G/DL — SIGNIFICANT CHANGE UP (ref 6–8)
PROT UR-MCNC: ABNORMAL
RBC # BLD: 4.96 M/UL — SIGNIFICANT CHANGE UP (ref 4.2–5.4)
RBC # FLD: 12.7 % — SIGNIFICANT CHANGE UP (ref 11.5–14.5)
RBC CASTS # UR COMP ASSIST: 6 /HPF — HIGH (ref 0–4)
SODIUM SERPL-SCNC: 138 MMOL/L — SIGNIFICANT CHANGE UP (ref 135–146)
SP GR SPEC: 1.03 — HIGH (ref 1.01–1.02)
UROBILINOGEN FLD QL: ABNORMAL
WBC # BLD: 5.21 K/UL — SIGNIFICANT CHANGE UP (ref 4.8–10.8)
WBC # FLD AUTO: 5.21 K/UL — SIGNIFICANT CHANGE UP (ref 4.8–10.8)
WBC UR QL: 6 /HPF — HIGH (ref 0–5)

## 2019-07-28 PROCEDURE — 99284 EMERGENCY DEPT VISIT MOD MDM: CPT

## 2019-07-28 PROCEDURE — 76830 TRANSVAGINAL US NON-OB: CPT | Mod: 26

## 2019-07-28 RX ORDER — ACETAMINOPHEN 500 MG
650 TABLET ORAL ONCE
Refills: 0 | Status: COMPLETED | OUTPATIENT
Start: 2019-07-28 | End: 2019-07-28

## 2019-07-28 RX ADMIN — Medication 650 MILLIGRAM(S): at 09:19

## 2019-07-28 NOTE — ED PROVIDER NOTE - CLINICAL SUMMARY MEDICAL DECISION MAKING FREE TEXT BOX
Preg F w/ abd pain. US + IUP. BHCG in appropriate range, UA neg, pelvic exam by Dr. Gupta, closed OS, non ttp, no vag bleeding or discharge. GYN notes reviewed Pt f/up on 7/25. IUP seen at that time.  Pt got abx tx for trich by GYN. Discussed all available results.  Pt and/ or family understand results, plan of care, outpt follow up as discussed, and signs and symptoms for ED return.  Pt/ family is comfortable with discharge.

## 2019-07-28 NOTE — ED PROVIDER NOTE - NSFOLLOWUPCLINICS_GEN_ALL_ED_FT
[facility]  OB/GYN  [address] [place] [address]  Phone: [phone]  Fax:   Follow Up Time: 1-3 Days

## 2019-07-28 NOTE — ED ADULT NURSE NOTE - OBJECTIVE STATEMENT
Pt presented c/o two days of lower abd pain associated with n/v. Stated she is 6 weeks pregnant, saw her ob/gyn last week. Denies any abnormal vaginal discharge/blood. Alert and oriented x3.

## 2019-07-28 NOTE — ED PROVIDER NOTE - PROGRESS NOTE DETAILS
GYN exam negative, IUP on US. Labs wnl. PT feeling better on reexamination and will d/c with close GYN follow up.

## 2019-07-28 NOTE — ED PROVIDER NOTE - NSFOLLOWUPINSTRUCTIONS_ED_ALL_ED_FT
Take tylenol 325 mg (not motrin or ibuprofen as this has the potential to cause complications with pregnancy) every 8 hours for pain as needed. Call for follow up with your OB in the next 24-48 hours. Return to the ED at any time or if any new or worsening symptoms.    Abdominal Pain    Many things can cause abdominal pain. Many times, abdominal pain is not caused by a disease and will improve without treatment. Your health care provider will do a physical exam to determine if there is a dangerous cause of your pain; blood tests and imaging may help determine the cause of your pain. However, in many cases, no cause may be found and you may need further testing as an outpatient. Monitor your abdominal pain for any changes.     SEEK IMMEDIATE MEDICAL CARE IF YOU HAVE ANY OF THE FOLLOWING SYMPTOMS: worsening abdominal pain, uncontrollable vomiting, profuse diarrhea, inability to have bowel movements or pass gas, black or bloody stools, fever accompanying chest pain or back pain, or fainting. These symptoms may represent a serious problem that is an emergency. Do not wait to see if the symptoms will go away. Get medical help right away. Call 911 and do not drive yourself to the hospital.

## 2019-07-28 NOTE — ED PROVIDER NOTE - PHYSICAL EXAMINATION
CONSTITUTIONAL: Well-developed; well-nourished; in no acute distress.   SKIN: warm, dry  HEAD: Normocephalic; atraumatic.  EYES: PERRL, EOMI, no conjunctival erythema  ENT: No nasal discharge; airway clear.  NECK: Supple; non tender.  CARD: S1, S2 normal; no murmurs, gallops, or rubs. Regular rate and rhythm.   RESP: No wheezes, rales or rhonchi.  ABD: soft ntnd, no CVA tenderness  EXT: Normal ROM.  No clubbing, cyanosis or edema.   LYMPH: No acute cervical adenopathy.  NEURO: Alert, oriented, grossly unremarkable  PSYCH: Cooperative, appropriate. CONSTITUTIONAL: Well-developed; well-nourished; in no acute distress.   SKIN: warm, dry  HEAD: Normocephalic; atraumatic.  EYES: PERRL, EOMI, no conjunctival erythema  ENT: No nasal discharge; airway clear.  NECK: Supple; non tender.  CARD: S1, S2 normal; no murmurs, gallops, or rubs. Regular rate and rhythm.   RESP: No wheezes, rales or rhonchi.  ABD: soft ntnd, no CVA tenderness  EXT: Normal ROM.  No clubbing, cyanosis or edema.   LYMPH: No acute cervical adenopathy.  NEURO: Alert, oriented, grossly unremarkable  PSYCH: Cooperative, appropriate.  GYN: os closed, no blood or secretions visualized, no rash or erythema

## 2019-07-28 NOTE — ED PROVIDER NOTE - NS ED ROS FT
Constitutional: (-) fever (-) vomiting  Eyes/ENT: (-) eye discharge, (-) runny nose  Cardiovascular: (-) chest pain, (-) syncope  Respiratory: (-) cough, (-) shortness of breath  Gastrointestinal: (-) vomiting, (-) diarrhea, (+) abdominal pain  : (-) dysuria   Musculoskeletal: (-) neck pain, (-) back pain, (-) joint pain  Integumentary: (-) rash, (-) edema  Neurological: (-) headache, (-)loc  Allergic/Immunologic: (-) pruritus  Endocrine: No history of thyroid disease or diabetes.

## 2019-07-28 NOTE — ED PROVIDER NOTE - OBJECTIVE STATEMENT
23F with pmh of chylamydia and trichomonas A5 presents with suprapubic abd pain and nausea beginning last night, described as cramping. Denies vaginal bleeding, dysuria, hematuria, CP, SOB, n/v/d. States that LMP . PT diagnosed in the ED with negative US and positive pregnancy test only. PT saw OB in clinic yesterday, unsure of name of provider.

## 2019-07-28 NOTE — ED PROVIDER NOTE - ATTENDING CONTRIBUTION TO CARE
22 y/o  preg F pmh ADHD, at about 6wks, p/w intermittent lower abd pain x about 1wk. Seen on Jul 21 for concern vag discharge and preg, bhcg 2477, no IUP, + trich but not treated. did not fup for repeat bhcg. + intermittent n/v/d. noted brown vag d/c on underwear in ED. No fever, dysuria. PE: NAD; abd s/t no r/g, no CVAT.  IMP: ectopic vs ab vs uti vs PID. P: labs, ua, US, analgesia, reassess. 22 y/o  preg F pmh ADHD, at about 6wks, p/w intermittent lower abd pain x about 1wk. Seen on Jul 21 for concern vag discharge and preg, bhcg 2477, no IUP, + trich but not treated. did not fup for repeat bhcg. + intermittent n/v/d. noted brown vag d/c on underwear in ED (?). No fever, dysuria. PE: NAD; abd s/t no r/g, no CVAT.  IMP: ectopic vs ab vs uti vs PID. P: labs, ua, US, analgesia, reassess.

## 2019-07-29 ENCOUNTER — EMERGENCY (EMERGENCY)
Facility: HOSPITAL | Age: 24
LOS: 0 days | Discharge: HOME | End: 2019-07-29
Attending: EMERGENCY MEDICINE | Admitting: EMERGENCY MEDICINE
Payer: MEDICAID

## 2019-07-29 VITALS
SYSTOLIC BLOOD PRESSURE: 167 MMHG | OXYGEN SATURATION: 100 % | RESPIRATION RATE: 18 BRPM | DIASTOLIC BLOOD PRESSURE: 73 MMHG | TEMPERATURE: 98 F | HEART RATE: 78 BPM

## 2019-07-29 VITALS
HEART RATE: 59 BPM | DIASTOLIC BLOOD PRESSURE: 56 MMHG | SYSTOLIC BLOOD PRESSURE: 123 MMHG | OXYGEN SATURATION: 100 % | RESPIRATION RATE: 18 BRPM | TEMPERATURE: 98 F

## 2019-07-29 DIAGNOSIS — R10.30 LOWER ABDOMINAL PAIN, UNSPECIFIED: ICD-10-CM

## 2019-07-29 DIAGNOSIS — O03.9 COMPLETE OR UNSPECIFIED SPONTANEOUS ABORTION WITHOUT COMPLICATION: Chronic | ICD-10-CM

## 2019-07-29 DIAGNOSIS — Z3A.01 LESS THAN 8 WEEKS GESTATION OF PREGNANCY: ICD-10-CM

## 2019-07-29 DIAGNOSIS — N93.9 ABNORMAL UTERINE AND VAGINAL BLEEDING, UNSPECIFIED: ICD-10-CM

## 2019-07-29 DIAGNOSIS — Z98.890 OTHER SPECIFIED POSTPROCEDURAL STATES: Chronic | ICD-10-CM

## 2019-07-29 DIAGNOSIS — O21.9 VOMITING OF PREGNANCY, UNSPECIFIED: ICD-10-CM

## 2019-07-29 LAB
ALBUMIN SERPL ELPH-MCNC: 4.4 G/DL — SIGNIFICANT CHANGE UP (ref 3.5–5.2)
ALP SERPL-CCNC: 10 U/L — LOW (ref 30–115)
ALT FLD-CCNC: 8 U/L — SIGNIFICANT CHANGE UP (ref 0–41)
ANION GAP SERPL CALC-SCNC: 16 MMOL/L — HIGH (ref 7–14)
APPEARANCE UR: CLEAR — SIGNIFICANT CHANGE UP
AST SERPL-CCNC: 52 U/L — HIGH (ref 0–41)
BACTERIA # UR AUTO: NEGATIVE — SIGNIFICANT CHANGE UP
BASOPHILS # BLD AUTO: 0.02 K/UL — SIGNIFICANT CHANGE UP (ref 0–0.2)
BASOPHILS NFR BLD AUTO: 0.3 % — SIGNIFICANT CHANGE UP (ref 0–1)
BILIRUB SERPL-MCNC: 0.6 MG/DL — SIGNIFICANT CHANGE UP (ref 0.2–1.2)
BILIRUB UR-MCNC: NEGATIVE — SIGNIFICANT CHANGE UP
BUN SERPL-MCNC: 15 MG/DL — SIGNIFICANT CHANGE UP (ref 10–20)
CALCIUM SERPL-MCNC: 9.7 MG/DL — SIGNIFICANT CHANGE UP (ref 8.5–10.1)
CHLORIDE SERPL-SCNC: 95 MMOL/L — LOW (ref 98–110)
CO2 SERPL-SCNC: 20 MMOL/L — SIGNIFICANT CHANGE UP (ref 17–32)
COLOR SPEC: YELLOW — SIGNIFICANT CHANGE UP
CREAT SERPL-MCNC: 0.8 MG/DL — SIGNIFICANT CHANGE UP (ref 0.7–1.5)
CULTURE RESULTS: SIGNIFICANT CHANGE UP
DIFF PNL FLD: ABNORMAL
EOSINOPHIL # BLD AUTO: 0.02 K/UL — SIGNIFICANT CHANGE UP (ref 0–0.7)
EOSINOPHIL NFR BLD AUTO: 0.3 % — SIGNIFICANT CHANGE UP (ref 0–8)
EPI CELLS # UR: 7 /HPF — HIGH (ref 0–5)
GLUCOSE SERPL-MCNC: 84 MG/DL — SIGNIFICANT CHANGE UP (ref 70–99)
GLUCOSE UR QL: NEGATIVE — SIGNIFICANT CHANGE UP
HCG SERPL-ACNC: HIGH MIU/ML
HCT VFR BLD CALC: 43.9 % — SIGNIFICANT CHANGE UP (ref 37–47)
HGB BLD-MCNC: 14.2 G/DL — SIGNIFICANT CHANGE UP (ref 12–16)
HYALINE CASTS # UR AUTO: 5 /LPF — SIGNIFICANT CHANGE UP (ref 0–7)
IMM GRANULOCYTES NFR BLD AUTO: 0.3 % — SIGNIFICANT CHANGE UP (ref 0.1–0.3)
KETONES UR-MCNC: ABNORMAL
LEUKOCYTE ESTERASE UR-ACNC: NEGATIVE — SIGNIFICANT CHANGE UP
LYMPHOCYTES # BLD AUTO: 1.9 K/UL — SIGNIFICANT CHANGE UP (ref 1.2–3.4)
LYMPHOCYTES # BLD AUTO: 28.2 % — SIGNIFICANT CHANGE UP (ref 20.5–51.1)
MCHC RBC-ENTMCNC: 27 PG — SIGNIFICANT CHANGE UP (ref 27–31)
MCHC RBC-ENTMCNC: 32.3 G/DL — SIGNIFICANT CHANGE UP (ref 32–37)
MCV RBC AUTO: 83.6 FL — SIGNIFICANT CHANGE UP (ref 81–99)
MONOCYTES # BLD AUTO: 0.67 K/UL — HIGH (ref 0.1–0.6)
MONOCYTES NFR BLD AUTO: 10 % — HIGH (ref 1.7–9.3)
NEUTROPHILS # BLD AUTO: 4.1 K/UL — SIGNIFICANT CHANGE UP (ref 1.4–6.5)
NEUTROPHILS NFR BLD AUTO: 60.9 % — SIGNIFICANT CHANGE UP (ref 42.2–75.2)
NITRITE UR-MCNC: NEGATIVE — SIGNIFICANT CHANGE UP
NRBC # BLD: 0 /100 WBCS — SIGNIFICANT CHANGE UP (ref 0–0)
PH UR: 6 — SIGNIFICANT CHANGE UP (ref 5–8)
PLATELET # BLD AUTO: 247 K/UL — SIGNIFICANT CHANGE UP (ref 130–400)
POTASSIUM SERPL-MCNC: SIGNIFICANT CHANGE UP MMOL/L (ref 3.5–5)
POTASSIUM SERPL-SCNC: SIGNIFICANT CHANGE UP MMOL/L (ref 3.5–5)
PROT SERPL-MCNC: 9.4 G/DL — HIGH (ref 6–8)
PROT UR-MCNC: ABNORMAL
RBC # BLD: 5.25 M/UL — SIGNIFICANT CHANGE UP (ref 4.2–5.4)
RBC # FLD: 12.6 % — SIGNIFICANT CHANGE UP (ref 11.5–14.5)
RBC CASTS # UR COMP ASSIST: 2 /HPF — SIGNIFICANT CHANGE UP (ref 0–4)
SODIUM SERPL-SCNC: 131 MMOL/L — LOW (ref 135–146)
SP GR SPEC: 1.04 — HIGH (ref 1.01–1.02)
SPECIMEN SOURCE: SIGNIFICANT CHANGE UP
UROBILINOGEN FLD QL: ABNORMAL
WBC # BLD: 6.73 K/UL — SIGNIFICANT CHANGE UP (ref 4.8–10.8)
WBC # FLD AUTO: 6.73 K/UL — SIGNIFICANT CHANGE UP (ref 4.8–10.8)
WBC UR QL: 2 /HPF — SIGNIFICANT CHANGE UP (ref 0–5)

## 2019-07-29 PROCEDURE — 99284 EMERGENCY DEPT VISIT MOD MDM: CPT

## 2019-07-29 RX ORDER — PYRIDOXINE HCL (VITAMIN B6) 100 MG
25 TABLET ORAL ONCE
Refills: 0 | Status: COMPLETED | OUTPATIENT
Start: 2019-07-29 | End: 2019-07-29

## 2019-07-29 RX ORDER — ONDANSETRON 8 MG/1
4 TABLET, FILM COATED ORAL ONCE
Refills: 0 | Status: DISCONTINUED | OUTPATIENT
Start: 2019-07-29 | End: 2019-07-29

## 2019-07-29 RX ORDER — ACETAMINOPHEN 500 MG
650 TABLET ORAL ONCE
Refills: 0 | Status: COMPLETED | OUTPATIENT
Start: 2019-07-29 | End: 2019-07-29

## 2019-07-29 RX ORDER — METOCLOPRAMIDE HCL 10 MG
10 TABLET ORAL ONCE
Refills: 0 | Status: COMPLETED | OUTPATIENT
Start: 2019-07-29 | End: 2019-07-29

## 2019-07-29 RX ORDER — PYRIDOXINE HCL (VITAMIN B6) 100 MG
1 TABLET ORAL
Qty: 45 | Refills: 0
Start: 2019-07-29 | End: 2019-08-12

## 2019-07-29 RX ORDER — ONDANSETRON 8 MG/1
4 TABLET, FILM COATED ORAL ONCE
Refills: 0 | Status: COMPLETED | OUTPATIENT
Start: 2019-07-29 | End: 2019-07-29

## 2019-07-29 RX ORDER — ONDANSETRON 8 MG/1
1 TABLET, FILM COATED ORAL
Qty: 10 | Refills: 0
Start: 2019-07-29 | End: 2019-07-31

## 2019-07-29 RX ORDER — SODIUM CHLORIDE 9 MG/ML
2000 INJECTION INTRAMUSCULAR; INTRAVENOUS; SUBCUTANEOUS ONCE
Refills: 0 | Status: COMPLETED | OUTPATIENT
Start: 2019-07-29 | End: 2019-07-29

## 2019-07-29 RX ADMIN — Medication 10 MILLIGRAM(S): at 12:20

## 2019-07-29 RX ADMIN — ONDANSETRON 4 MILLIGRAM(S): 8 TABLET, FILM COATED ORAL at 10:12

## 2019-07-29 RX ADMIN — Medication 650 MILLIGRAM(S): at 10:12

## 2019-07-29 RX ADMIN — Medication 650 MILLIGRAM(S): at 11:12

## 2019-07-29 RX ADMIN — SODIUM CHLORIDE 2000 MILLILITER(S): 9 INJECTION INTRAMUSCULAR; INTRAVENOUS; SUBCUTANEOUS at 10:14

## 2019-07-29 RX ADMIN — SODIUM CHLORIDE 2000 MILLILITER(S): 9 INJECTION INTRAMUSCULAR; INTRAVENOUS; SUBCUTANEOUS at 09:14

## 2019-07-29 RX ADMIN — Medication 25 MILLIGRAM(S): at 11:01

## 2019-07-29 NOTE — ED PROVIDER NOTE - PHYSICAL EXAMINATION
Vital Signs: I have reviewed the initial vital signs.  Constitutional: NAD, well-nourished, appears stated age, vomiting actively  HEENT: Airway patent, moist MM, no erythema/swelling/deformity of oral structures. EOMI, PERRLA.  CV: regular rate, regular rhythm, well-perfused extremities, 2+ b/l DP and radial pulses equal.  Lungs: BCTA, no increased WOB.  ABD: discomfort to suprapubic palpation otherwise NTND, no guarding or rebound, no pulsatile mass, no hernias.   : normal external female genitalia, no active bleeding. underwear with dark streak.   MSK: Neck supple, nontender, nl ROM, no stepoff. Chest nontender. Back nontender in TLS spine or to b/l bony structures or flanks. Ext nontender, nl rom, no deformity.   INTEG: Skin warm, dry, no rash.  NEURO: A&Ox3, moving all extremities, normal speech  PSYCH: Calm, cooperative, normal affect and interaction.

## 2019-07-29 NOTE — ED PROVIDER NOTE - CLINICAL SUMMARY MEDICAL DECISION MAKING FREE TEXT BOX
22yo  6 weeks pregnant p/w nausea and vomiting. Nausea controlled, pt tolerating PO. +IUP on US seen on yesterday ED visit. Pt to follow up with ObGyn.  Patient to be discharged from ED. Any available test results were discussed with patient and/or family. Verbal instructions given, including instructions to return to ED immediately for any new, worsening, or concerning symptoms. Patient endorsed understanding. Written discharge instructions additionally given, including follow-up plan.

## 2019-07-29 NOTE — ED PROVIDER NOTE - ATTENDING CONTRIBUTION TO CARE
I personally evaluated the patient. I reviewed the Resident’s or Physician Assistant’s note (as assigned above), and agree with the findings and plan except as documented in my note.    22yo F, , approx 6 weeks pregnant LMP 6/14 p/w nausea and vomiting since this morning. Unable to tolerate PO at home. Denies abd/pelvic pain, vaginal bleeding or discharge. Denies fever, chills, headache, lightheadedness, CP, SOB, cough, diarrhea, dysuria, rash. Pt seen in ED yesterday for pelvic pain/cramping and nausea, +ve IUP. 1 week ago had tests showing +ve BV and trichomonas, treated with flagyl, GC/Chlamydia negative.     Exam: VS reviewed. Patient nontoxic appearing, NAD. NCAT. Anicteric. Slightly dry MM. Supple, non tender. Normal respiratory effort, CTA B/L, no rhonchi, rales, crackles. RRR. No murmurs. Abdomen soft, NDNT, no guarding or rebound. Cap refill <2sec. 2+ radial pulses. Skin warm and dry. AAOx3. No gross FND.     Labs, urine, IVF, antiemetics, reassess

## 2019-07-29 NOTE — ED PROVIDER NOTE - NSFOLLOWUPCLINICS_GEN_ALL_ED_FT
[facility]  OB/GYN  [address] [place] [address]  Phone: [phone]  Fax:   Follow Up Time:

## 2019-07-29 NOTE — ED PROVIDER NOTE - OBJECTIVE STATEMENT
23yF  ega 6 weeks by ultrasound dating yesterday p/w nausea and vomiting, persistent during the pregnancy with difficulty tolerating po. also with abd pain and one episode of vaginal spotting (dark brown streak on underwear, no blood). no diarrhea fever chills blood in vomit or stool. no contraction like pain. no urinary sx or vaginal discharge.

## 2019-07-29 NOTE — ED PROVIDER NOTE - NS ED ROS FT
Eyes:  No visual changes, eye pain or discharge.  ENMT:  No hearing changes, pain, no sore throat or runny nose, no difficulty swallowing  Cardiac:  No chest pain, SOB or edema. No chest pain with exertion.  Respiratory:  No cough or respiratory distress.    GI:  No diarrhea. +n/v, abdominal pain.  :  No dysuria, frequency or burning.  MS:  No myalgia, muscle weakness, joint pain or back pain.  Neuro:  No headache or weakness.  No LOC.  Skin:  No skin rash.   Endocrine: No history of thyroid disease or diabetes.

## 2019-08-01 ENCOUNTER — EMERGENCY (EMERGENCY)
Facility: HOSPITAL | Age: 24
LOS: 0 days | Discharge: HOME | End: 2019-08-01
Attending: EMERGENCY MEDICINE | Admitting: EMERGENCY MEDICINE
Payer: MEDICAID

## 2019-08-01 VITALS — HEIGHT: 64 IN | WEIGHT: 139.99 LBS

## 2019-08-01 VITALS
HEART RATE: 97 BPM | SYSTOLIC BLOOD PRESSURE: 109 MMHG | RESPIRATION RATE: 18 BRPM | OXYGEN SATURATION: 98 % | DIASTOLIC BLOOD PRESSURE: 70 MMHG | TEMPERATURE: 98 F

## 2019-08-01 DIAGNOSIS — O03.9 COMPLETE OR UNSPECIFIED SPONTANEOUS ABORTION WITHOUT COMPLICATION: Chronic | ICD-10-CM

## 2019-08-01 DIAGNOSIS — Z98.890 OTHER SPECIFIED POSTPROCEDURAL STATES: Chronic | ICD-10-CM

## 2019-08-01 DIAGNOSIS — N93.9 ABNORMAL UTERINE AND VAGINAL BLEEDING, UNSPECIFIED: ICD-10-CM

## 2019-08-01 DIAGNOSIS — R10.32 LEFT LOWER QUADRANT PAIN: ICD-10-CM

## 2019-08-01 DIAGNOSIS — O21.9 VOMITING OF PREGNANCY, UNSPECIFIED: ICD-10-CM

## 2019-08-01 DIAGNOSIS — O20.0 THREATENED ABORTION: ICD-10-CM

## 2019-08-01 DIAGNOSIS — Z79.899 OTHER LONG TERM (CURRENT) DRUG THERAPY: ICD-10-CM

## 2019-08-01 DIAGNOSIS — Z3A.01 LESS THAN 8 WEEKS GESTATION OF PREGNANCY: ICD-10-CM

## 2019-08-01 LAB
ALBUMIN SERPL ELPH-MCNC: 4.3 G/DL — SIGNIFICANT CHANGE UP (ref 3.5–5.2)
ALP SERPL-CCNC: 34 U/L — SIGNIFICANT CHANGE UP (ref 30–115)
ALT FLD-CCNC: 8 U/L — SIGNIFICANT CHANGE UP (ref 0–41)
ANION GAP SERPL CALC-SCNC: 10 MMOL/L — SIGNIFICANT CHANGE UP (ref 7–14)
APPEARANCE UR: ABNORMAL
APPEARANCE UR: ABNORMAL
AST SERPL-CCNC: 18 U/L — SIGNIFICANT CHANGE UP (ref 0–41)
BACTERIA # UR AUTO: ABNORMAL
BACTERIA # UR AUTO: NEGATIVE — SIGNIFICANT CHANGE UP
BASOPHILS # BLD AUTO: 0.02 K/UL — SIGNIFICANT CHANGE UP (ref 0–0.2)
BASOPHILS NFR BLD AUTO: 0.3 % — SIGNIFICANT CHANGE UP (ref 0–1)
BILIRUB SERPL-MCNC: 0.3 MG/DL — SIGNIFICANT CHANGE UP (ref 0.2–1.2)
BILIRUB UR-MCNC: NEGATIVE — SIGNIFICANT CHANGE UP
BILIRUB UR-MCNC: NEGATIVE — SIGNIFICANT CHANGE UP
BLD GP AB SCN SERPL QL: SIGNIFICANT CHANGE UP
BUN SERPL-MCNC: 11 MG/DL — SIGNIFICANT CHANGE UP (ref 10–20)
CALCIUM SERPL-MCNC: 9.6 MG/DL — SIGNIFICANT CHANGE UP (ref 8.5–10.1)
CHLORIDE SERPL-SCNC: 102 MMOL/L — SIGNIFICANT CHANGE UP (ref 98–110)
CO2 SERPL-SCNC: 26 MMOL/L — SIGNIFICANT CHANGE UP (ref 17–32)
COD CRY URNS QL: ABNORMAL
COLOR SPEC: YELLOW — SIGNIFICANT CHANGE UP
COLOR SPEC: YELLOW — SIGNIFICANT CHANGE UP
CREAT SERPL-MCNC: 0.7 MG/DL — SIGNIFICANT CHANGE UP (ref 0.7–1.5)
DIFF PNL FLD: ABNORMAL
DIFF PNL FLD: ABNORMAL
EOSINOPHIL # BLD AUTO: 0.06 K/UL — SIGNIFICANT CHANGE UP (ref 0–0.7)
EOSINOPHIL NFR BLD AUTO: 1 % — SIGNIFICANT CHANGE UP (ref 0–8)
EPI CELLS # UR: 7 /HPF — HIGH (ref 0–5)
EPI CELLS # UR: >27 /HPF — HIGH (ref 0–5)
GLUCOSE SERPL-MCNC: 94 MG/DL — SIGNIFICANT CHANGE UP (ref 70–99)
GLUCOSE UR QL: NEGATIVE — SIGNIFICANT CHANGE UP
GLUCOSE UR QL: NEGATIVE — SIGNIFICANT CHANGE UP
GRAN CASTS # UR COMP ASSIST: 1 /LPF — HIGH
HCG SERPL QL: POSITIVE — SIGNIFICANT CHANGE UP
HCG SERPL-ACNC: HIGH MIU/ML
HCT VFR BLD CALC: 39.5 % — SIGNIFICANT CHANGE UP (ref 37–47)
HGB BLD-MCNC: 12.8 G/DL — SIGNIFICANT CHANGE UP (ref 12–16)
HYALINE CASTS # UR AUTO: 16 /LPF — HIGH (ref 0–7)
HYALINE CASTS # UR AUTO: 4 /LPF — SIGNIFICANT CHANGE UP (ref 0–7)
IMM GRANULOCYTES NFR BLD AUTO: 0.3 % — SIGNIFICANT CHANGE UP (ref 0.1–0.3)
KETONES UR-MCNC: SIGNIFICANT CHANGE UP
KETONES UR-MCNC: SIGNIFICANT CHANGE UP
LEUKOCYTE ESTERASE UR-ACNC: NEGATIVE — SIGNIFICANT CHANGE UP
LEUKOCYTE ESTERASE UR-ACNC: NEGATIVE — SIGNIFICANT CHANGE UP
LYMPHOCYTES # BLD AUTO: 1.67 K/UL — SIGNIFICANT CHANGE UP (ref 1.2–3.4)
LYMPHOCYTES # BLD AUTO: 28.7 % — SIGNIFICANT CHANGE UP (ref 20.5–51.1)
MCHC RBC-ENTMCNC: 27.5 PG — SIGNIFICANT CHANGE UP (ref 27–31)
MCHC RBC-ENTMCNC: 32.4 G/DL — SIGNIFICANT CHANGE UP (ref 32–37)
MCV RBC AUTO: 84.9 FL — SIGNIFICANT CHANGE UP (ref 81–99)
MONOCYTES # BLD AUTO: 0.54 K/UL — SIGNIFICANT CHANGE UP (ref 0.1–0.6)
MONOCYTES NFR BLD AUTO: 9.3 % — SIGNIFICANT CHANGE UP (ref 1.7–9.3)
NEUTROPHILS # BLD AUTO: 3.51 K/UL — SIGNIFICANT CHANGE UP (ref 1.4–6.5)
NEUTROPHILS NFR BLD AUTO: 60.4 % — SIGNIFICANT CHANGE UP (ref 42.2–75.2)
NITRITE UR-MCNC: NEGATIVE — SIGNIFICANT CHANGE UP
NITRITE UR-MCNC: NEGATIVE — SIGNIFICANT CHANGE UP
NRBC # BLD: 0 /100 WBCS — SIGNIFICANT CHANGE UP (ref 0–0)
PH UR: 6 — SIGNIFICANT CHANGE UP (ref 5–8)
PH UR: 7.5 — SIGNIFICANT CHANGE UP (ref 5–8)
PLATELET # BLD AUTO: 211 K/UL — SIGNIFICANT CHANGE UP (ref 130–400)
POTASSIUM SERPL-MCNC: 4.3 MMOL/L — SIGNIFICANT CHANGE UP (ref 3.5–5)
POTASSIUM SERPL-SCNC: 4.3 MMOL/L — SIGNIFICANT CHANGE UP (ref 3.5–5)
PROT SERPL-MCNC: 7.4 G/DL — SIGNIFICANT CHANGE UP (ref 6–8)
PROT UR-MCNC: ABNORMAL
PROT UR-MCNC: ABNORMAL
RBC # BLD: 4.65 M/UL — SIGNIFICANT CHANGE UP (ref 4.2–5.4)
RBC # FLD: 12.8 % — SIGNIFICANT CHANGE UP (ref 11.5–14.5)
RBC CASTS # UR COMP ASSIST: 1 /HPF — SIGNIFICANT CHANGE UP (ref 0–4)
RBC CASTS # UR COMP ASSIST: 6 /HPF — HIGH (ref 0–4)
SODIUM SERPL-SCNC: 138 MMOL/L — SIGNIFICANT CHANGE UP (ref 135–146)
SP GR SPEC: 1.03 — HIGH (ref 1.01–1.02)
SP GR SPEC: 1.03 — HIGH (ref 1.01–1.02)
UROBILINOGEN FLD QL: ABNORMAL
UROBILINOGEN FLD QL: ABNORMAL
WBC # BLD: 5.82 K/UL — SIGNIFICANT CHANGE UP (ref 4.8–10.8)
WBC # FLD AUTO: 5.82 K/UL — SIGNIFICANT CHANGE UP (ref 4.8–10.8)
WBC UR QL: 2 /HPF — SIGNIFICANT CHANGE UP (ref 0–5)
WBC UR QL: 2 /HPF — SIGNIFICANT CHANGE UP (ref 0–5)

## 2019-08-01 PROCEDURE — 76830 TRANSVAGINAL US NON-OB: CPT | Mod: 26

## 2019-08-01 PROCEDURE — 99284 EMERGENCY DEPT VISIT MOD MDM: CPT

## 2019-08-01 RX ORDER — ONDANSETRON 8 MG/1
4 TABLET, FILM COATED ORAL ONCE
Refills: 0 | Status: COMPLETED | OUTPATIENT
Start: 2019-08-01 | End: 2019-08-01

## 2019-08-01 RX ORDER — ACETAMINOPHEN 500 MG
650 TABLET ORAL ONCE
Refills: 0 | Status: COMPLETED | OUTPATIENT
Start: 2019-08-01 | End: 2019-08-01

## 2019-08-01 RX ADMIN — Medication 650 MILLIGRAM(S): at 10:31

## 2019-08-01 RX ADMIN — ONDANSETRON 4 MILLIGRAM(S): 8 TABLET, FILM COATED ORAL at 10:30

## 2019-08-01 NOTE — ED ADULT NURSE NOTE - OBJECTIVE STATEMENT
24 y/o female brought in for vaginal bleeding. Patient is currently 6 weeks pregnant LMP June 14. Patient has been feeling lower abdominal pain x 2 weeks with nausea, vomiting, and cramping. patient woke up this morning around 6 am with bright red vaginal bleeding, soaked 4-5 pads since 6am. patient has had 5 abortions in the past, 6 pregnancies, no living children. patient denies dizziness, headache, chest pain, fatigue, sob.

## 2019-08-01 NOTE — ED PROVIDER NOTE - NS ED ROS FT
Review of Systems:  •	CONSTITUTIONAL - No fever, No diaphoresis, No weight change  •	SKIN - No rash  •	HEMATOLOGIC - No abnormal bleeding or bruising  •	EYES - No eye pain, No blurred vision  •	ENT - No change in hearing, No sore throat, No neck pain, No rhinorrhea, No ear pain  •	RESPIRATORY - No shortness of breath, No cough  •	CARDIAC -No chest pain, No palpitations  •	GI - + abdominal pain, No nausea, No vomiting, No diarrhea, No constipation, No bright red blood per rectum or melena. No flank pain  •             - + vaginal bleeding. No dysuria, frequency, hematuria.   •	ENDO - No polydypsia, No polyuria, No heat/cold intolerance  •	MUSCULOSKELETAL - No joint paint, No swelling, No back pain  •	NEUROLOGIC - No numbness, No focal weakness, No headache, No dizziness  All other systems negative, unless specified in HPI

## 2019-08-01 NOTE — ED PROVIDER NOTE - NSFOLLOWUPCLINICS_GEN_ALL_ED_FT
[facility]  OB/GYN  [address] [place] [address]  Phone: [phone]  Fax:   Follow Up Time:

## 2019-08-01 NOTE — ED PROVIDER NOTE - PROGRESS NOTE DETAILS
US resulted noted, Ob/gyn consulted and discussed results with ob who state pt can f/u I the clinic. I personally evaluated the patient. I reviewed the Resident’s or Physician Assistant’s note (as assigned above), and agree with the findings and plan except as documented in my note.   22 Y/O F , PREGNANT, LMP 6/15 C/O VAGINAL BLEEDING AND LOWER ABD PAIN THIS AM. PT REPORTS USING 5 PADS THIS AM. NO CLOTS. + CRAMPY LOWER ABD PAIN, L >R. + NAUSEA DN VOMITING THIS AM. NO DIARRHEA. NORMAL URINATION. NO BACK PAIN. NO FEVER, CHILLS. PT [facility] . VITALS NOTED. ALERT OX3 NAD WELL APPEARING. NCAT PERRL. EOMI. OP NORMAL. MMM. NECK SUPPLE. LUNGS CLEAR B/L. RRR. S1S2. ABD- SOFT + LLQ TENDERNESS. NO REBOUND OR GUARDING. NO LEG EDEMA. BACK NONTENDER. NO CVAT B/L. Patient to be discharged from ED. Any available test results were discussed with patient and/or family. Verbal instructions given, including instructions to return to ED immediately for any new, worsening, or concerning symptoms. Patient endorsed understanding. Written discharge instructions additionally given, including follow-up plan. I personally evaluated the patient. I reviewed the Resident’s or Physician Assistant’s note (as assigned above), and agree with the findings and plan except as documented in my note.   22 Y/O F , PREGNANT,  C/O VAGINAL BLEEDING AND LOWER ABD PAIN THIS AM. PT REPORTS USING 5 PADS THIS AM. NO CLOTS. + CRAMPY LOWER ABD PAIN, L >R. + NAUSEA DN VOMITING THIS AM. NO DIARRHEA. NORMAL URINATION. NO BACK PAIN. NO FEVER, CHILLS. PT [facility] . VITALS NOTED. ALERT OX3 NAD WELL APPEARING. NCAT PERRL. EOMI. OP NORMAL. MMM. NECK SUPPLE. LUNGS CLEAR B/L. RRR. S1S2. ABD- SOFT + LLQ TENDERNESS. NO REBOUND OR GUARDING. NO LEG EDEMA. BACK NONTENDER. NO CVAT B/L.

## 2019-08-01 NOTE — ED PROVIDER NOTE - CLINICAL SUMMARY MEDICAL DECISION MAKING FREE TEXT BOX
22 Y/O F PREGNANT C/O VAGINAL BELEDING AND ABD PAIN TODAY. LMP 6/15. ALL DIAGNOSTIC TESTING REVIEWED. PELVIC SONO APPRECIATED. PT [facility] FO[facility]. PT GIVEN STRICT RETURN INSTRUCTIONS.

## 2019-08-01 NOTE — SBIRT NOTE ADULT - NSSBIRTDRGBRIEFINTDET_GEN_A_CORE
Screening results were reviewed with the patient and patient was provided information about healthy guidelines and potential negative consequences associated with level of risk. Motivation and readiness to reduce or stop use was discussed and goals and activities to make changes were suggested/offered.

## 2019-08-01 NOTE — ED PROVIDER NOTE - PHYSICAL EXAMINATION
CONSTITUTIONAL: Well-developed; well-nourished; in no acute distress.   SKIN: warm, dry  HEAD: Normocephalic; atraumatic.  EYES: no conjunctival injection. PERRL.   ENT: No nasal discharge; airway clear.  NECK: Supple; non tender.  CARD: S1, S2 normal; no murmurs, gallops, or rubs. Regular rate and rhythm.   RESP: No wheezes, rales or rhonchi.  ABD: soft ntnd  PELVIC: Normal appearing female genitalia, no masses/lesions/erythema/discharge. Os closed. Minimal amount of blood in the vault. No active bleeding. No CMT or adnexal tenderness.  EXT: Normal ROM.  No clubbing, cyanosis or edema.   LYMPH: No acute cervical adenopathy.  NEURO: Alert, oriented, grossly unremarkable  PSYCH: Cooperative, appropriate.

## 2019-08-01 NOTE — ED ADULT TRIAGE NOTE - CHIEF COMPLAINT QUOTE
"im 6 weeks pregnant and I started bleeding this morning. I went through 4-5 pads this morning. bright red blood with slight cramping"

## 2019-08-02 LAB
CULTURE RESULTS: SIGNIFICANT CHANGE UP
SPECIMEN SOURCE: SIGNIFICANT CHANGE UP

## 2019-08-05 ENCOUNTER — EMERGENCY (EMERGENCY)
Facility: HOSPITAL | Age: 24
LOS: 0 days | Discharge: HOME | End: 2019-08-05
Attending: EMERGENCY MEDICINE | Admitting: EMERGENCY MEDICINE
Payer: MEDICAID

## 2019-08-05 VITALS
TEMPERATURE: 98 F | DIASTOLIC BLOOD PRESSURE: 63 MMHG | RESPIRATION RATE: 18 BRPM | HEART RATE: 78 BPM | OXYGEN SATURATION: 100 % | SYSTOLIC BLOOD PRESSURE: 117 MMHG

## 2019-08-05 DIAGNOSIS — A59.01 TRICHOMONAL VULVOVAGINITIS: ICD-10-CM

## 2019-08-05 DIAGNOSIS — O21.9 VOMITING OF PREGNANCY, UNSPECIFIED: ICD-10-CM

## 2019-08-05 DIAGNOSIS — Z3A.01 LESS THAN 8 WEEKS GESTATION OF PREGNANCY: ICD-10-CM

## 2019-08-05 DIAGNOSIS — Z79.899 OTHER LONG TERM (CURRENT) DRUG THERAPY: ICD-10-CM

## 2019-08-05 DIAGNOSIS — R10.32 LEFT LOWER QUADRANT PAIN: ICD-10-CM

## 2019-08-05 DIAGNOSIS — F17.200 NICOTINE DEPENDENCE, UNSPECIFIED, UNCOMPLICATED: ICD-10-CM

## 2019-08-05 DIAGNOSIS — R10.9 UNSPECIFIED ABDOMINAL PAIN: ICD-10-CM

## 2019-08-05 DIAGNOSIS — Z98.890 OTHER SPECIFIED POSTPROCEDURAL STATES: Chronic | ICD-10-CM

## 2019-08-05 DIAGNOSIS — O03.9 COMPLETE OR UNSPECIFIED SPONTANEOUS ABORTION WITHOUT COMPLICATION: Chronic | ICD-10-CM

## 2019-08-05 LAB
ALBUMIN SERPL ELPH-MCNC: 4.3 G/DL — SIGNIFICANT CHANGE UP (ref 3.5–5.2)
ALP SERPL-CCNC: 34 U/L — SIGNIFICANT CHANGE UP (ref 30–115)
ALT FLD-CCNC: 10 U/L — SIGNIFICANT CHANGE UP (ref 0–41)
ANION GAP SERPL CALC-SCNC: 14 MMOL/L — SIGNIFICANT CHANGE UP (ref 7–14)
APPEARANCE UR: ABNORMAL
AST SERPL-CCNC: 18 U/L — SIGNIFICANT CHANGE UP (ref 0–41)
BACTERIA # UR AUTO: ABNORMAL
BASOPHILS # BLD AUTO: 0.03 K/UL — SIGNIFICANT CHANGE UP (ref 0–0.2)
BASOPHILS NFR BLD AUTO: 0.4 % — SIGNIFICANT CHANGE UP (ref 0–1)
BILIRUB SERPL-MCNC: 0.5 MG/DL — SIGNIFICANT CHANGE UP (ref 0.2–1.2)
BILIRUB UR-MCNC: NEGATIVE — SIGNIFICANT CHANGE UP
BUN SERPL-MCNC: 14 MG/DL — SIGNIFICANT CHANGE UP (ref 10–20)
CALCIUM SERPL-MCNC: 10.7 MG/DL — HIGH (ref 8.5–10.1)
CHLORIDE SERPL-SCNC: 98 MMOL/L — SIGNIFICANT CHANGE UP (ref 98–110)
CO2 SERPL-SCNC: 26 MMOL/L — SIGNIFICANT CHANGE UP (ref 17–32)
COLOR SPEC: YELLOW — SIGNIFICANT CHANGE UP
CREAT SERPL-MCNC: 0.7 MG/DL — SIGNIFICANT CHANGE UP (ref 0.7–1.5)
DIFF PNL FLD: NEGATIVE — SIGNIFICANT CHANGE UP
EOSINOPHIL # BLD AUTO: 0.01 K/UL — SIGNIFICANT CHANGE UP (ref 0–0.7)
EOSINOPHIL NFR BLD AUTO: 0.1 % — SIGNIFICANT CHANGE UP (ref 0–8)
EPI CELLS # UR: 15 /HPF — HIGH (ref 0–5)
GLUCOSE SERPL-MCNC: 94 MG/DL — SIGNIFICANT CHANGE UP (ref 70–99)
GLUCOSE UR QL: NEGATIVE — SIGNIFICANT CHANGE UP
HCG SERPL-ACNC: HIGH MIU/ML
HCT VFR BLD CALC: 40.7 % — SIGNIFICANT CHANGE UP (ref 37–47)
HGB BLD-MCNC: 13.8 G/DL — SIGNIFICANT CHANGE UP (ref 12–16)
HYALINE CASTS # UR AUTO: 16 /LPF — HIGH (ref 0–7)
IMM GRANULOCYTES NFR BLD AUTO: 0.3 % — SIGNIFICANT CHANGE UP (ref 0.1–0.3)
KETONES UR-MCNC: ABNORMAL
LEUKOCYTE ESTERASE UR-ACNC: NEGATIVE — SIGNIFICANT CHANGE UP
LIDOCAIN IGE QN: 22 U/L — SIGNIFICANT CHANGE UP (ref 7–60)
LYMPHOCYTES # BLD AUTO: 1.57 K/UL — SIGNIFICANT CHANGE UP (ref 1.2–3.4)
LYMPHOCYTES # BLD AUTO: 23.2 % — SIGNIFICANT CHANGE UP (ref 20.5–51.1)
MCHC RBC-ENTMCNC: 28.2 PG — SIGNIFICANT CHANGE UP (ref 27–31)
MCHC RBC-ENTMCNC: 33.9 G/DL — SIGNIFICANT CHANGE UP (ref 32–37)
MCV RBC AUTO: 83.2 FL — SIGNIFICANT CHANGE UP (ref 81–99)
MONOCYTES # BLD AUTO: 0.69 K/UL — HIGH (ref 0.1–0.6)
MONOCYTES NFR BLD AUTO: 10.2 % — HIGH (ref 1.7–9.3)
NEUTROPHILS # BLD AUTO: 4.45 K/UL — SIGNIFICANT CHANGE UP (ref 1.4–6.5)
NEUTROPHILS NFR BLD AUTO: 65.8 % — SIGNIFICANT CHANGE UP (ref 42.2–75.2)
NITRITE UR-MCNC: NEGATIVE — SIGNIFICANT CHANGE UP
NRBC # BLD: 0 /100 WBCS — SIGNIFICANT CHANGE UP (ref 0–0)
PH UR: 6.5 — SIGNIFICANT CHANGE UP (ref 5–8)
PLATELET # BLD AUTO: 234 K/UL — SIGNIFICANT CHANGE UP (ref 130–400)
POTASSIUM SERPL-MCNC: 3.9 MMOL/L — SIGNIFICANT CHANGE UP (ref 3.5–5)
POTASSIUM SERPL-SCNC: 3.9 MMOL/L — SIGNIFICANT CHANGE UP (ref 3.5–5)
PROT SERPL-MCNC: 7.6 G/DL — SIGNIFICANT CHANGE UP (ref 6–8)
PROT UR-MCNC: ABNORMAL
RBC # BLD: 4.89 M/UL — SIGNIFICANT CHANGE UP (ref 4.2–5.4)
RBC # FLD: 12.5 % — SIGNIFICANT CHANGE UP (ref 11.5–14.5)
RBC CASTS # UR COMP ASSIST: 1 /HPF — SIGNIFICANT CHANGE UP (ref 0–4)
SODIUM SERPL-SCNC: 138 MMOL/L — SIGNIFICANT CHANGE UP (ref 135–146)
SP GR SPEC: 1.03 — HIGH (ref 1.01–1.02)
UROBILINOGEN FLD QL: ABNORMAL
WBC # BLD: 6.77 K/UL — SIGNIFICANT CHANGE UP (ref 4.8–10.8)
WBC # FLD AUTO: 6.77 K/UL — SIGNIFICANT CHANGE UP (ref 4.8–10.8)
WBC UR QL: 4 /HPF — SIGNIFICANT CHANGE UP (ref 0–5)

## 2019-08-05 PROCEDURE — 76817 TRANSVAGINAL US OBSTETRIC: CPT | Mod: 26

## 2019-08-05 PROCEDURE — 99284 EMERGENCY DEPT VISIT MOD MDM: CPT

## 2019-08-05 RX ORDER — ONDANSETRON 8 MG/1
4 TABLET, FILM COATED ORAL ONCE
Refills: 0 | Status: COMPLETED | OUTPATIENT
Start: 2019-08-05 | End: 2019-08-05

## 2019-08-05 RX ORDER — ACETAMINOPHEN 500 MG
650 TABLET ORAL ONCE
Refills: 0 | Status: COMPLETED | OUTPATIENT
Start: 2019-08-05 | End: 2019-08-05

## 2019-08-05 RX ORDER — METRONIDAZOLE 500 MG
2000 TABLET ORAL ONCE
Refills: 0 | Status: COMPLETED | OUTPATIENT
Start: 2019-08-05 | End: 2019-08-05

## 2019-08-05 RX ORDER — SODIUM CHLORIDE 9 MG/ML
1000 INJECTION, SOLUTION INTRAVENOUS ONCE
Refills: 0 | Status: COMPLETED | OUTPATIENT
Start: 2019-08-05 | End: 2019-08-05

## 2019-08-05 RX ADMIN — SODIUM CHLORIDE 2000 MILLILITER(S): 9 INJECTION, SOLUTION INTRAVENOUS at 09:13

## 2019-08-05 RX ADMIN — Medication 650 MILLIGRAM(S): at 09:13

## 2019-08-05 RX ADMIN — Medication 2000 MILLIGRAM(S): at 11:28

## 2019-08-05 RX ADMIN — Medication 650 MILLIGRAM(S): at 09:56

## 2019-08-05 RX ADMIN — ONDANSETRON 4 MILLIGRAM(S): 8 TABLET, FILM COATED ORAL at 09:13

## 2019-08-05 NOTE — ED PROVIDER NOTE - PROGRESS NOTE DETAILS
24 yo F  7w5d by US done 19, hx of GC/CT, L hemorrhagic cyst who presents with acute worsening of LLQ/L flank pain and vaginal discharge. Hemodynamically stable, afebrile. Ordered labs, ua, urine cx, GC/CT, pelvic US. IVF, tylenol for sx. Will reassess. Pt was diagnosed with trichomonas in july, Rx for Flagyl 2g was sent to the pharmacy but pt only took 500mg pill. Pt was diagnosed with trichomonas in july, Rx for Flagyl 2g was sent to the pharmacy but pt only took 500mg pill. Will give flagyl here. Labs wnl. UA negative. GC/CT sent. Pending US read. Pt resting comfortably. US shows single IUP 7w0d. L ovarian cyst unchanged. Will d/c to f/u with obgyn clinic in 3 days or sooner if sx worsen, can f/u GC/CT results there. Instructed on safe sex and to have partner tx'ed. Pt verbalized understanding and was agreeable with plan.

## 2019-08-05 NOTE — ED PROVIDER NOTE - NS ED ROS FT
GEN:  no fever, + chills  NEURO:  no headache, no dizziness  ENT: no sore throat, no runny nose  CV:  no chest pain, no SOB  RESP:  no dyspnea, no cough  GI:  + nausea, + vomiting, + abdominal pain, no diarrhea, no constipation  :  no dysuria, + urinary frequency, no hematuria  MSK:  no joint pain, no edema  SKIN:  no rash, no bruising  HEME: no hematochezia, no melena

## 2019-08-05 NOTE — ED PROVIDER NOTE - NSFOLLOWUPCLINICS_GEN_ALL_ED_FT
[facility]  OB/GYN  [address] [place] [address]  Phone: [phone]  Fax:   Follow Up Time: 1-3 Days

## 2019-08-05 NOTE — ED PROVIDER NOTE - CLINICAL SUMMARY MEDICAL DECISION MAKING FREE TEXT BOX
Pt with trichomoniasis, wasn't treated before. Flagyl given in the ED. Will discharge with OB follow up this wk

## 2019-08-05 NOTE — ED ADULT NURSE NOTE - NSIMPLEMENTINTERV_GEN_ALL_ED
Implemented All Universal Safety Interventions:  [place] to call system. Call bell, personal items and telephone within reach. Instruct patient to call for assistance. Room bathroom lighting operational. Non-slip footwear when patient is off stretcher. Physically safe environment: no spills, clutter or unnecessary equipment. Stretcher in lowest position, wheels locked, appropriate side rails in place.

## 2019-08-05 NOTE — ED PROVIDER NOTE - PMH
ADHD    Chlamydia    UTI (urinary tract infection) ADHD    Chlamydia    Hemorrhagic cyst of left ovary    UTI (urinary tract infection)

## 2019-08-05 NOTE — ED ADULT NURSE NOTE - OBJECTIVE STATEMENT
Patient states she is approx 6 weeks pregnant. Has been having lower abdominal pain on and off for the past 2 weeks and has  come to the hospital recently for the same pain/discomfort. Currently denies vomiting, chest pain or diarrhea. No difficulty breathing or shortness of breath. Patient denies vaginal bleeding or discharge.

## 2019-08-05 NOTE — ED PROVIDER NOTE - NSFOLLOWUPINSTRUCTIONS_ED_ALL_ED_FT
Trichomoniasis  Trichomoniasis is an STI (sexually transmitted infection) that can affect both women and men. In women, the outer area of the female genitalia (vulva) and the vagina are affected. In men, the penis is mainly affected, but the prostate and other reproductive organs can also be involved. This condition can be treated with medicine. It often has no symptoms (is asymptomatic), especially in men.    What are the causes?  This condition is caused by an organism called Trichomonas vaginalis. Trichomoniasis most often spreads from person to person (is contagious) through sexual contact.    What increases the risk?  The following factors may make you more likely to develop this condition:  Having unprotected sexual intercourse.  Having sexual intercourse with a partner who has trichomoniasis.  Having multiple sexual partners.  Having had previous trichomoniasis infections or other STIs.  What are the signs or symptoms?  In women, symptoms of trichomoniasis include:  Abnormal vaginal discharge that is clear, white, gray, or yellow-green and foamy and has an unusual "fishy" odor.  Itching and irritation of the vagina and vulva.  Burning or pain during urination or sexual intercourse.  Genital redness and swelling.  In men, symptoms of trichomoniasis include:  Penile discharge that may be foamy or contain pus.  Pain in the penis. This may happen only when urinating.  Itching or irritation inside the penis.  Burning after urination or ejaculation.  How is this diagnosed?  In women, this condition may be found during a routine Pap test or physical exam. It may be found in men during a routine physical exam. Your health care provider may perform tests to help diagnose this infection, such as:  Urine tests (men and women).  The following in women:  Testing the pH of the vagina.  A vaginal swab test that checks for the Trichomonas vaginalis organism.  Testing vaginal secretions.  Your health care provider may test you for other STIs, including HIV (human immunodeficiency virus).    How is this treated?  ImageThis condition is treated with medicine taken by mouth (orally), such as metronidazole or tinidazole to fight the infection. Your sexual partner(s) may also need to be tested and treated.  If you are a woman and you plan to become pregnant or think you may be pregnant, tell your health care provider right away. Some medicines that are used to treat the infection should not be taken during pregnancy.  Your health care provider may recommend over-the-counter medicines or creams to help relieve itching or irritation. You may be tested for infection again 3 months after treatment.    Follow these instructions at home:  Take and use over-the-counter and prescription medicines, including creams, only as told by your health care provider.  Do not have sexual intercourse until one week after you finish your medicine, or until your health care provider approves. Ask your health care provider when you may resume sexual intercourse.  (Women) Do not douche or wear tampons while you have the infection.  Discuss your infection with your sexual partner(s). Make sure that your partner gets tested and treated, if necessary.  Keep all follow-up visits as told by your health care provider. This is important.  How is this prevented?  Use condoms every time you have sex. Using condoms correctly and consistently can help protect against STIs.  Avoid having multiple sexual partners.  Talk with your sexual partner about any symptoms that either of you may have, as well as any history of STIs.  Get tested for STIs and STDs (sexually transmitted diseases) before you have sex. Ask your partner to do the same.  Do not have sexual contact if you have symptoms of trichomoniasis or another STI.  Contact a health care provider if:  You still have symptoms after you finish your medicine.  You develop pain in your abdomen.  You have pain when you urinate.  You have bleeding after sexual intercourse.  You develop a rash.  You feel nauseous or you vomit.  You plan to become pregnant or think you may be pregnant.  Summary  Trichomoniasis is an STI (sexually transmitted infection) that can affect both women and men.  This condition often has no symptoms (is asymptomatic), especially in men.  You should not have sexual intercourse until one week after you finish your medicine, or until your health care provider approves. Ask your health care provider when you may resume sexual intercourse.  Discuss your infection with your sexual partner. Make sure that your partner gets tested and treated, if necessary.  This information is not intended to replace advice given to you by your health care provider. Make sure you discuss any questions you have with your health care provider.

## 2019-08-05 NOTE — ED ADULT NURSE REASSESSMENT NOTE - CONDITION
Patient is well appearing with no c/o discomfort or pain. Medication provided as ordered and patient tolerated po fluids.

## 2019-08-05 NOTE — ED PROVIDER NOTE - ATTENDING CONTRIBUTION TO CARE
23 y.o. female  (5 abortions) c/o left sided abdominal discomfort which she has been having for 3 wks, worse today. Associated with vaginal discharge. No bleeding. No CP/SOB. No fever/chills. (+) urinary frequency. Pt is 7wks pregnant based on US. On exam, pt in NAD, AAOx3, head NC/AT, CN II-XII intact, lungs CTA B/L, CV S1S2 regular, abdomen soft/(+) LLQ abdominal pain/ND/(+)BS, ext (-) edema. Will do labs, UA, US and reevaluate.

## 2019-08-07 ENCOUNTER — EMERGENCY (EMERGENCY)
Facility: HOSPITAL | Age: 24
LOS: 0 days | Discharge: HOME | End: 2019-08-07
Attending: EMERGENCY MEDICINE | Admitting: EMERGENCY MEDICINE
Payer: MEDICAID

## 2019-08-07 VITALS
TEMPERATURE: 98 F | HEART RATE: 59 BPM | DIASTOLIC BLOOD PRESSURE: 57 MMHG | SYSTOLIC BLOOD PRESSURE: 106 MMHG | RESPIRATION RATE: 18 BRPM

## 2019-08-07 VITALS
OXYGEN SATURATION: 100 % | HEART RATE: 60 BPM | WEIGHT: 139.99 LBS | RESPIRATION RATE: 18 BRPM | HEIGHT: 64 IN | TEMPERATURE: 98 F | DIASTOLIC BLOOD PRESSURE: 69 MMHG | SYSTOLIC BLOOD PRESSURE: 110 MMHG

## 2019-08-07 DIAGNOSIS — N93.9 ABNORMAL UTERINE AND VAGINAL BLEEDING, UNSPECIFIED: ICD-10-CM

## 2019-08-07 DIAGNOSIS — O20.8 OTHER HEMORRHAGE IN EARLY PREGNANCY: ICD-10-CM

## 2019-08-07 DIAGNOSIS — N83.209 UNSPECIFIED OVARIAN CYST, UNSPECIFIED SIDE: ICD-10-CM

## 2019-08-07 DIAGNOSIS — Z98.890 OTHER SPECIFIED POSTPROCEDURAL STATES: Chronic | ICD-10-CM

## 2019-08-07 DIAGNOSIS — O03.9 COMPLETE OR UNSPECIFIED SPONTANEOUS ABORTION WITHOUT COMPLICATION: Chronic | ICD-10-CM

## 2019-08-07 DIAGNOSIS — Z3A.01 LESS THAN 8 WEEKS GESTATION OF PREGNANCY: ICD-10-CM

## 2019-08-07 LAB
ALBUMIN SERPL ELPH-MCNC: 4 G/DL — SIGNIFICANT CHANGE UP (ref 3.5–5.2)
ALP SERPL-CCNC: 24 U/L — LOW (ref 30–115)
ALT FLD-CCNC: 11 U/L — SIGNIFICANT CHANGE UP (ref 0–41)
ANION GAP SERPL CALC-SCNC: 13 MMOL/L — SIGNIFICANT CHANGE UP (ref 7–14)
APPEARANCE UR: ABNORMAL
AST SERPL-CCNC: 29 U/L — SIGNIFICANT CHANGE UP (ref 0–41)
BACTERIA # UR AUTO: ABNORMAL
BILIRUB SERPL-MCNC: 0.4 MG/DL — SIGNIFICANT CHANGE UP (ref 0.2–1.2)
BILIRUB UR-MCNC: NEGATIVE — SIGNIFICANT CHANGE UP
BUN SERPL-MCNC: 13 MG/DL — SIGNIFICANT CHANGE UP (ref 10–20)
CALCIUM SERPL-MCNC: 9.3 MG/DL — SIGNIFICANT CHANGE UP (ref 8.5–10.1)
CHLORIDE SERPL-SCNC: 101 MMOL/L — SIGNIFICANT CHANGE UP (ref 98–110)
CO2 SERPL-SCNC: 23 MMOL/L — SIGNIFICANT CHANGE UP (ref 17–32)
COLOR SPEC: YELLOW — SIGNIFICANT CHANGE UP
CREAT SERPL-MCNC: 0.6 MG/DL — LOW (ref 0.7–1.5)
DIFF PNL FLD: NEGATIVE — SIGNIFICANT CHANGE UP
EPI CELLS # UR: 9 /HPF — HIGH (ref 0–5)
GLUCOSE SERPL-MCNC: 75 MG/DL — SIGNIFICANT CHANGE UP (ref 70–99)
GLUCOSE UR QL: NEGATIVE — SIGNIFICANT CHANGE UP
HCT VFR BLD CALC: 36.9 % — LOW (ref 37–47)
HGB BLD-MCNC: 12.3 G/DL — SIGNIFICANT CHANGE UP (ref 12–16)
HYALINE CASTS # UR AUTO: 4 /LPF — SIGNIFICANT CHANGE UP (ref 0–7)
KETONES UR-MCNC: ABNORMAL
LEUKOCYTE ESTERASE UR-ACNC: NEGATIVE — SIGNIFICANT CHANGE UP
MCHC RBC-ENTMCNC: 28.1 PG — SIGNIFICANT CHANGE UP (ref 27–31)
MCHC RBC-ENTMCNC: 33.3 G/DL — SIGNIFICANT CHANGE UP (ref 32–37)
MCV RBC AUTO: 84.2 FL — SIGNIFICANT CHANGE UP (ref 81–99)
NITRITE UR-MCNC: NEGATIVE — SIGNIFICANT CHANGE UP
NRBC # BLD: 0 /100 WBCS — SIGNIFICANT CHANGE UP (ref 0–0)
PH UR: 6.5 — SIGNIFICANT CHANGE UP (ref 5–8)
PLATELET # BLD AUTO: 207 K/UL — SIGNIFICANT CHANGE UP (ref 130–400)
POTASSIUM SERPL-MCNC: 4.9 MMOL/L — SIGNIFICANT CHANGE UP (ref 3.5–5)
POTASSIUM SERPL-SCNC: 4.9 MMOL/L — SIGNIFICANT CHANGE UP (ref 3.5–5)
PROT SERPL-MCNC: 7 G/DL — SIGNIFICANT CHANGE UP (ref 6–8)
PROT UR-MCNC: ABNORMAL
RBC # BLD: 4.38 M/UL — SIGNIFICANT CHANGE UP (ref 4.2–5.4)
RBC # FLD: 12.6 % — SIGNIFICANT CHANGE UP (ref 11.5–14.5)
RBC CASTS # UR COMP ASSIST: 3 /HPF — SIGNIFICANT CHANGE UP (ref 0–4)
SODIUM SERPL-SCNC: 137 MMOL/L — SIGNIFICANT CHANGE UP (ref 135–146)
SP GR SPEC: 1.03 — HIGH (ref 1.01–1.02)
UROBILINOGEN FLD QL: SIGNIFICANT CHANGE UP
WBC # BLD: 5.7 K/UL — SIGNIFICANT CHANGE UP (ref 4.8–10.8)
WBC # FLD AUTO: 5.7 K/UL — SIGNIFICANT CHANGE UP (ref 4.8–10.8)
WBC UR QL: 2 /HPF — SIGNIFICANT CHANGE UP (ref 0–5)

## 2019-08-07 PROCEDURE — 76856 US EXAM PELVIC COMPLETE: CPT | Mod: 26

## 2019-08-07 PROCEDURE — 99284 EMERGENCY DEPT VISIT MOD MDM: CPT

## 2019-08-07 RX ORDER — ONDANSETRON 8 MG/1
4 TABLET, FILM COATED ORAL ONCE
Refills: 0 | Status: COMPLETED | OUTPATIENT
Start: 2019-08-07 | End: 2019-08-07

## 2019-08-07 RX ORDER — SODIUM CHLORIDE 9 MG/ML
1000 INJECTION INTRAMUSCULAR; INTRAVENOUS; SUBCUTANEOUS ONCE
Refills: 0 | Status: COMPLETED | OUTPATIENT
Start: 2019-08-07 | End: 2019-08-07

## 2019-08-07 RX ADMIN — ONDANSETRON 4 MILLIGRAM(S): 8 TABLET, FILM COATED ORAL at 09:51

## 2019-08-07 RX ADMIN — SODIUM CHLORIDE 1000 MILLILITER(S): 9 INJECTION INTRAMUSCULAR; INTRAVENOUS; SUBCUTANEOUS at 09:51

## 2019-08-07 NOTE — ED PROVIDER NOTE - NS ED ROS FT
CONST: No fever, chills or bodyaches  CARD: No chest pain, palpitations  RESP: No SOB, cough  GI: see HPI  MS: No joint pain, back pain or extremity pain/injury  SKIN: No rashes

## 2019-08-07 NOTE — ED ADULT TRIAGE NOTE - CHIEF COMPLAINT QUOTE
Patient BIBEMS from home, 7 weeks pregnant. Patient states she has abdominal pain x 3 days and vaginal bleeding for 2 hours. Pt states the blood is bright red but has not soaked through one pad.

## 2019-08-07 NOTE — ED PROVIDER NOTE - OBJECTIVE STATEMENT
24 yo F  7w5d by US done 19, hx of GC/CT, L hemorrhagic cyst presents c/o L constant, non-radiating lower abdominal pain and 1 episode of vaginal bleeding, bright red blood, last evening which has since resolved. Patient was previously seen in ED 19 for threatened ab and 19 for LLQ pain dx'ed ovarian cyst. She report she has been taking "800mg of tylenol," without relief. No aggravating factors. Admits to nausea, no current vomiting. Pt has an OB/GYN clinic appt tomorrow

## 2019-08-07 NOTE — ED PROVIDER NOTE - CLINICAL SUMMARY MEDICAL DECISION MAKING FREE TEXT BOX
pt feeling better, tolerated po, no symptoms at this time. understands proper tylneol dosing, signs and symptoms to return for, understands ultrasound findings including no subchronic hemorrhage and size of L cyst now. will follow up at her appt tomorrow at the obgyn clinic.

## 2019-08-07 NOTE — ED PROVIDER NOTE - NSFOLLOWUPCLINICS_GEN_ALL_ED_FT
[facility]  OB/GYN  [address] [place] [address]  Phone: [phone]  Fax:   Follow Up Time:

## 2019-08-07 NOTE — ED ADULT NURSE NOTE - NSIMPLEMENTINTERV_GEN_ALL_ED
Implemented All Universal Safety Interventions:  [place] to call system. Call bell, personal items and telephone within reach. Instruct patient to call for assistance. Room bathroom lighting operational. Non-slip footwear when patient is off stretcher. Physically safe environment: no spills, clutter or unnecessary equipment. Stretcher in lowest position, wheels locked, appropriate side rails in place.

## 2019-08-07 NOTE — ED PROVIDER NOTE - PROGRESS NOTE DETAILS
pt has been resting comfortably in nad, abd re exam soft ntnd no r/g no cvat, tolerated po, understands repeat ultrasound showing a single live IUP,  7 weeks and 1 day; fetal cardiac activity demonstrated   with a heart rate of 151 bpm. 3.9 x 3.2 x 1.9 cm, L ovary now with cyst of size 1.8 x 1.5 x 1.5  cm -hemorrhagic right corpus luteum. Doppler flow is demonstrated to both ovaries. as well as a new small subchorionic hemorrhage. Pt understands rest, hydration and [facility] she has had multiple ultrasounds and exam with recent ct and ng testing that was negative and treated for trich, reports no vaginal discharge or bleeding.

## 2019-08-07 NOTE — ED ADULT NURSE NOTE - OBJECTIVE STATEMENT
Pt a&ox3, states she is 7 weeks pregnant, having nausea and vomiting, pt states she ran out of zofran at home. Pt also c/o vaginal bleeding, bright red blood since today. Pt states she has diffuse lower abd pain, radiating to lower back that is constant described as cramping and rated 10/10. Pt denies fever/chills, denies bloody emesis, denies CP/SOB.

## 2019-08-07 NOTE — ED PROVIDER NOTE - PHYSICAL EXAMINATION
CONST: Well appearing in NAD  CARD: Normal S1 S2; Normal rate and rhythm  RESP: Equal BS B/L, No wheezes, rhonchi or rales. No distress  GI: Soft, tenderness to L midabdomen and LLQ. No rebound or guarding  MS: Normal ROM in all extremities. No midline spinal tenderness.  SKIN: Warm, dry, no acute rashes. Good turgor  NEURO: A&Ox3, No focal deficits. Strength 5/5 with no sensory deficits.

## 2019-08-07 NOTE — ED PROVIDER NOTE - NSFOLLOWUPINSTRUCTIONS_ED_ALL_ED_FT
DO NOT TAKE MOTRIN, ADVIL OR IBUPROFEN WHILE PREGNANT. YOU MAY TAKE TYLENOL OR ACETAMINOPHEN, 500MG TABS X 2 EVERY 6-8HRS, OR 325MG TABS X 2 EVERY 4-6HRS          Ovarian Cyst  Image Image   An ovarian cyst is a fluid-filled sac that forms on an ovary. The ovaries are small organs that produce eggs in women. Various types of cysts can form on the ovaries. Some may cause symptoms and require treatment. Most ovarian cysts go away on their own, are not cancerous (are benign), and do not cause problems.    Common types of ovarian cysts include:  Functional (follicle) cysts.  Occur during the menstrual cycle, and usually go away with the next menstrual cycle if you do not get pregnant.  Usually cause no symptoms.  Endometriomas.  Are cysts that form from the tissue that lines the uterus (endometrium).  Are sometimes called “chocolate cysts” because they become filled with blood that turns brown.  Can cause pain in the lower abdomen during intercourse and during your period.  Cystadenoma cysts.  Develop from cells on the outside surface of the ovary.  Can get very large and cause lower abdomen pain and pain with intercourse.  Can cause severe pain if they twist or break open (rupture).  Dermoid cysts.  Are sometimes found in both ovaries.  May contain different kinds of body tissue, such as skin, teeth, hair, or cartilage.  Usually do not cause symptoms unless they get very big.  Theca lutein cysts.  Occur when too much of a certain hormone (human chorionic gonadotropin) is produced and overstimulates the ovaries to produce an egg.  Are most common after having procedures used to assist with the conception of a baby (in vitro fertilization).  What are the causes?  Ovarian cysts may be caused by:  Ovarian hyperstimulation syndrome. This is a condition that can develop from taking fertility medicines. It causes multiple large ovarian cysts to form.  Polycystic ovarian syndrome (PCOS). This is a common hormonal disorder that can cause ovarian cysts, as well as problems with your period or fertility.  What increases the risk?  The following factors may make you more likely to develop ovarian cysts:  Being overweight or obese.  Taking fertility medicines.  Taking certain forms of hormonal birth control.  Smoking.  What are the signs or symptoms?  Many ovarian cysts do not cause symptoms. If symptoms are present, they may include:  Pelvic pain or pressure.  Pain in the lower abdomen.  Pain during sex.  Abdominal swelling.  Abnormal menstrual periods.  Increasing pain with menstrual periods.  How is this diagnosed?  These cysts are commonly found during a routine pelvic exam. You may have tests to find out more about the cyst, such as:  Ultrasound.  X-ray of the pelvis.  CT scan.  MRI.  Blood tests.  How is this treated?  Many ovarian cysts go away on their own without treatment. Your health care provider may want to check your cyst regularly for 2–3 months to see if it changes. If you are in menopause, it is especially important to have your cyst monitored closely because menopausal women have a higher rate of ovarian cancer.    When treatment is needed, it may include:  Medicines to help relieve pain.  A procedure to drain the cyst (aspiration).  Surgery to remove the whole cyst.  Hormone treatment or birth control pills. These methods are sometimes used to help dissolve a cyst.  Follow these instructions at home:  Take over-the-counter and prescription medicines only as told by your health care provider.  Do not drive or use heavy machinery while taking prescription pain medicine.  Get regular pelvic exams and Pap tests as often as told by your health care provider.  Return to your normal activities as told by your health care provider. Ask your health care provider what activities are safe for you.  Do not use any products that contain nicotine or tobacco, such as cigarettes and e-cigarettes. If you need help quitting, ask your health care provider.  Keep all follow-up visits as told by your health care provider. This is important.  Contact a health care provider if:  Your periods are late, irregular, or painful, or they stop.  You have pelvic pain that does not go away.  You have pressure on your bladder or trouble emptying your bladder completely.  You have pain during sex.  You have any of the following in your abdomen:  A feeling of fullness.  Pressure.  Discomfort.  Pain that does not go away.  Swelling.  You feel generally ill.  You become constipated.  You lose your appetite.  You develop severe acne.  You start to have more body hair and facial hair.  You are gaining weight or losing weight without changing your exercise and eating habits.  You think you may be pregnant.  Get help right away if:  You have abdominal pain that is severe or gets worse.  You cannot eat or drink without vomiting.  You suddenly develop a fever.  Your menstrual period is much heavier than usual.  This information is not intended to replace advice given to you by your health care provider. Make sure you discuss any questions you have with your health care provider.

## 2019-08-07 NOTE — ED PROVIDER NOTE - ATTENDING CONTRIBUTION TO CARE
A 26 y/o f  ~ 7w5d by US done 19, hx of GC/CT (had swab sent on which was negative and treated for BV/Trich on  in ed), L hemorrhagic cyst presents w/ intermittent, moderate, sharp, LLQ pain x 3 wks, nonradiating. reports tylneol that we give in ed helps but not the one she takes at home (reports she takes 800 mg? of tylnel,and after further discussion about dose and taking possible motrin reports she believes it was Tylenol)). Pt reports she had vaginal bleeding on previous visits that has since subsided, pt was seen on  when she found out she was pregnant, , ,  and  with appt tomorrow with ob-gyn. A 26 y/o f w/ pmhx of asthma,  ~ 7w5d by US done 19, hx of GC/CT (had swab sent on which was negative and treated for BV/Trich on  in ed), L hemorrhagic cyst presents w/ intermittent, moderate, sharp, LLQ pain x 3 wks, nonradiating. reports tylneol that we give in ed helps but not the one she takes at home (reports she takes 800 mg? of tylnel,and after further discussion about dose and taking possible motrin reports she believes it was Tylenol)). Pt reports she had vaginal bleeding on previous visits that has since subsided, pt was seen on  when she found out she was pregnant, , ,  and  with appt tomorrow with ob-gyn. A 24 y/o f w/ pmhx of asthma,  ~ 7 weeks and 0 days (ultrasound done in ED on August in addition to previous ones noted in PACS, Fetal heart rate at 165 beats/min)   hx of GC/CT (had swab sent on which was negative and treated for Trich on  in ed), L hemorrhagic cyst (seen on ultrasound on  2.2 x 2.20 x1.9) presents w/ intermittent, moderate, sharp, LLQ pain x 3 wks, non-radiating. reports Tylenol that we give in ed helps but not the one she takes at home (reports she takes 800 mg? of Tylenol, and after further discussion about dose and taking possible Motrin reports she believes it was Tylenol)). Pt reports she had vaginal bleeding on previous visits that has since subsided, pt was seen on  when she found out she was pregnant (obgyn also saw pt this day), , ,  and  with appt tomorrow with ob-gyn. (_) nausea and vomiting x 1 yesterday (reports was given zofran prescription). denies fever, chills, cp, sob, pleuritic chest pain, palpitations, diaphoresis, cough, diarrhea, constipation, melena/brbpr, urinary symptoms, back/ flank pain, current vaginal bleeding/discharge/odor, sick contacts, recent travel or rash. had been having normal bm. lmp- 2019 , (+) history of sti's, ob-gyn- clinic.  on exam:  female pt sitting on stretcher in nad, no rash, mmm, regular rate, radial pulses 2/4 b/l, no jvd, ctabl w/ breath sounds present b/l, no wheezing or crackles, good air exchange, good respiratory effort, no accessory muscle use, no tachypnea, no stridor, bs present throughout all 4 quadrants, abd soft, nd, tenderness to palpation to llq,  no rebound tenderness or guarding, no cvat, (-) murphys (-) rosving (-) obturator (-) psoas. FROM of ext, no edema, no calf pain/swelling/erythema, AAOx3. No focal deficits.

## 2019-08-07 NOTE — ED PROVIDER NOTE - CARE PLAN
Assessment and plan of treatment:	Plan: Labs, ivf, urine, pelvic ultrasound, reassess. Principal Discharge DX:	Ovarian cyst  Assessment and plan of treatment:	Plan: Labs, ivf, urine, pelvic ultrasound, reassess.  Secondary Diagnosis:	Pregnancy  Secondary Diagnosis:	Subchorionic hematoma

## 2019-08-08 ENCOUNTER — OUTPATIENT (OUTPATIENT)
Dept: OUTPATIENT SERVICES | Facility: HOSPITAL | Age: 24
LOS: 1 days | Discharge: HOME | End: 2019-08-08

## 2019-08-08 ENCOUNTER — APPOINTMENT (OUTPATIENT)
Dept: ANTEPARTUM | Facility: CLINIC | Age: 24
End: 2019-08-08
Payer: MEDICAID

## 2019-08-08 DIAGNOSIS — Z98.890 OTHER SPECIFIED POSTPROCEDURAL STATES: Chronic | ICD-10-CM

## 2019-08-08 DIAGNOSIS — O03.9 COMPLETE OR UNSPECIFIED SPONTANEOUS ABORTION WITHOUT COMPLICATION: Chronic | ICD-10-CM

## 2019-08-08 PROCEDURE — 76801 OB US < 14 WKS SINGLE FETUS: CPT | Mod: 26

## 2019-08-12 ENCOUNTER — RESULT CHARGE (OUTPATIENT)
Age: 24
End: 2019-08-12

## 2019-08-12 ENCOUNTER — APPOINTMENT (OUTPATIENT)
Dept: OBGYN | Facility: CLINIC | Age: 24
End: 2019-08-12
Payer: MEDICAID

## 2019-08-12 ENCOUNTER — OUTPATIENT (OUTPATIENT)
Dept: OUTPATIENT SERVICES | Facility: HOSPITAL | Age: 24
LOS: 1 days | Discharge: HOME | End: 2019-08-12

## 2019-08-12 ENCOUNTER — LABORATORY RESULT (OUTPATIENT)
Age: 24
End: 2019-08-12

## 2019-08-12 VITALS
HEIGHT: 63 IN | WEIGHT: 108 LBS | SYSTOLIC BLOOD PRESSURE: 110 MMHG | BODY MASS INDEX: 19.14 KG/M2 | DIASTOLIC BLOOD PRESSURE: 70 MMHG

## 2019-08-12 DIAGNOSIS — O03.9 COMPLETE OR UNSPECIFIED SPONTANEOUS ABORTION WITHOUT COMPLICATION: Chronic | ICD-10-CM

## 2019-08-12 DIAGNOSIS — Z98.890 OTHER SPECIFIED POSTPROCEDURAL STATES: Chronic | ICD-10-CM

## 2019-08-12 LAB
ABO + RH PNL BLD: NORMAL
APPEARANCE: CLEAR
BASOPHILS # BLD AUTO: 0.03 K/UL
BASOPHILS NFR BLD AUTO: 0.4 %
BILIRUB UR QL STRIP: NORMAL
BILIRUBIN URINE: NEGATIVE
BLD GP AB SCN SERPL QL: NORMAL
BLOOD URINE: NEGATIVE
CLARITY UR: CLEAR
COLLECTION METHOD: NORMAL
COLOR: YELLOW
EOSINOPHIL # BLD AUTO: 0.01 K/UL
EOSINOPHIL NFR BLD AUTO: 0.1 %
GLUCOSE QUALITATIVE U: NEGATIVE
GLUCOSE UR-MCNC: NORMAL
HCG UR QL: 0.2 EU/DL
HCT VFR BLD CALC: 40.9 %
HGB BLD-MCNC: 13.3 G/DL
HGB UR QL STRIP.AUTO: NORMAL
IMM GRANULOCYTES NFR BLD AUTO: 0.1 %
KETONES UR-MCNC: NORMAL
KETONES URINE: ABNORMAL
LEUKOCYTE ESTERASE UR QL STRIP: NORMAL
LEUKOCYTE ESTERASE URINE: NEGATIVE
LYMPHOCYTES # BLD AUTO: 1.9 K/UL
LYMPHOCYTES NFR BLD AUTO: 22.7 %
MAN DIFF?: NORMAL
MCHC RBC-ENTMCNC: 27.2 PG
MCHC RBC-ENTMCNC: 32.5 G/DL
MCV RBC AUTO: 83.6 FL
MONOCYTES # BLD AUTO: 0.81 K/UL
MONOCYTES NFR BLD AUTO: 9.7 %
NEUTROPHILS # BLD AUTO: 5.6 K/UL
NEUTROPHILS NFR BLD AUTO: 67 %
NITRITE UR QL STRIP: NORMAL
NITRITE URINE: NEGATIVE
PH UR STRIP: 6
PH URINE: 6.5
PLATELET # BLD AUTO: 261 K/UL
PROT UR STRIP-MCNC: NORMAL
PROTEIN URINE: ABNORMAL
RBC # BLD: 4.89 M/UL
RBC # FLD: 13.2 %
SP GR UR STRIP: 1.02
SPECIFIC GRAVITY URINE: 1.03
UROBILINOGEN URINE: NORMAL
WBC # FLD AUTO: 8.36 K/UL

## 2019-08-12 PROCEDURE — 76815 OB US LIMITED FETUS(S): CPT | Mod: 26

## 2019-08-12 PROCEDURE — 99214 OFFICE O/P EST MOD 30 MIN: CPT | Mod: 25

## 2019-08-12 NOTE — PHYSICAL EXAM
[Normal] : cervix [No Bleeding] : there was no active vaginal bleeding [Enlarged ___ wks] : enlarged [unfilled] ~Uweeks [Uterine Adnexae] : were not tender and not enlarged [FreeTextEntry7] : Uterus is slightly retroverted and is extending into sacral area - possibly causing some of her pain.

## 2019-08-12 NOTE — PROCEDURE
[Intrauterine Pregnancy] : intrauterine pregnancy [Fetal Heart] : fetal heart present [Yolk Sac] : no yolk sac [WNL] : Transabdominal OB Sonogram WNL [FreeTextEntry1] : crl c/w 8w1d. See written report.

## 2019-08-13 ENCOUNTER — APPOINTMENT (OUTPATIENT)
Dept: OBGYN | Facility: CLINIC | Age: 24
End: 2019-08-13

## 2019-08-13 ENCOUNTER — APPOINTMENT (OUTPATIENT)
Dept: INTERNAL MEDICINE | Facility: CLINIC | Age: 24
End: 2019-08-13

## 2019-08-14 DIAGNOSIS — Z34.90 ENCOUNTER FOR SUPERVISION OF NORMAL PREGNANCY, UNSPECIFIED, UNSPECIFIED TRIMESTER: ICD-10-CM

## 2019-08-14 DIAGNOSIS — O26.899 OTHER SPECIFIED PREGNANCY RELATED CONDITIONS, UNSPECIFIED TRIMESTER: ICD-10-CM

## 2019-08-14 LAB
BACTERIA UR CULT: NORMAL
C TRACH RRNA SPEC QL NAA+PROBE: NOT DETECTED
HBV SURFACE AG SER QL: NONREACTIVE
HIV1+2 AB SPEC QL IA.RAPID: NONREACTIVE
LEAD BLD-MCNC: <1 UG/DL
MEV IGG FLD QL IA: >300 AU/ML
MEV IGG+IGM SER-IMP: POSITIVE
N GONORRHOEA RRNA SPEC QL NAA+PROBE: NOT DETECTED
RUBV IGG FLD-ACNC: 2.6 INDEX
RUBV IGG SER-IMP: POSITIVE
SOURCE AMPLIFICATION: NORMAL
T PALLIDUM AB SER QL IA: NEGATIVE
VZV AB TITR SER: POSITIVE
VZV IGG SER IF-ACNC: 364 INDEX

## 2019-08-16 LAB
HGB A MFR BLD: 97.9 %
HGB A2 MFR BLD: 2.1 %
HGB FRACT BLD-IMP: NORMAL

## 2019-08-19 LAB
AR GENE MUT ANL BLD/T: NEGATIVE
CFTR MUT TESTED BLD/T: NEGATIVE

## 2019-09-04 ENCOUNTER — MEDICATION RENEWAL (OUTPATIENT)
Age: 24
End: 2019-09-04

## 2019-09-09 ENCOUNTER — RESULT CHARGE (OUTPATIENT)
Age: 24
End: 2019-09-09

## 2019-09-09 ENCOUNTER — APPOINTMENT (OUTPATIENT)
Dept: OBGYN | Facility: CLINIC | Age: 24
End: 2019-09-09
Payer: MEDICARE

## 2019-09-09 ENCOUNTER — OUTPATIENT (OUTPATIENT)
Dept: OUTPATIENT SERVICES | Facility: HOSPITAL | Age: 24
LOS: 1 days | Discharge: HOME | End: 2019-09-09

## 2019-09-09 ENCOUNTER — NON-APPOINTMENT (OUTPATIENT)
Age: 24
End: 2019-09-09

## 2019-09-09 VITALS — DIASTOLIC BLOOD PRESSURE: 60 MMHG | WEIGHT: 116 LBS | SYSTOLIC BLOOD PRESSURE: 100 MMHG

## 2019-09-09 DIAGNOSIS — O03.9 COMPLETE OR UNSPECIFIED SPONTANEOUS ABORTION WITHOUT COMPLICATION: Chronic | ICD-10-CM

## 2019-09-09 DIAGNOSIS — Z98.890 OTHER SPECIFIED POSTPROCEDURAL STATES: Chronic | ICD-10-CM

## 2019-09-09 LAB
BILIRUB UR QL STRIP: NEGATIVE
CLARITY UR: CLEAR
COLLECTION METHOD: NORMAL
GLUCOSE UR-MCNC: NEGATIVE
HCG UR QL: NEGATIVE EU/DL
HGB UR QL STRIP.AUTO: NEGATIVE
KETONES UR-MCNC: NEGATIVE
LEUKOCYTE ESTERASE UR QL STRIP: NEGATIVE
NITRITE UR QL STRIP: NEGATIVE
PH UR STRIP: 6
PROT UR STRIP-MCNC: NEGATIVE
SP GR UR STRIP: 1.02

## 2019-09-09 PROCEDURE — 0500F INITIAL PRENATAL CARE VISIT: CPT

## 2019-09-10 DIAGNOSIS — Z34.90 ENCOUNTER FOR SUPERVISION OF NORMAL PREGNANCY, UNSPECIFIED, UNSPECIFIED TRIMESTER: ICD-10-CM

## 2019-09-12 ENCOUNTER — APPOINTMENT (OUTPATIENT)
Dept: OBGYN | Facility: CLINIC | Age: 24
End: 2019-09-12

## 2019-09-13 ENCOUNTER — APPOINTMENT (OUTPATIENT)
Dept: ANTEPARTUM | Facility: CLINIC | Age: 24
End: 2019-09-13

## 2019-09-24 ENCOUNTER — APPOINTMENT (OUTPATIENT)
Dept: OBGYN | Facility: CLINIC | Age: 24
End: 2019-09-24

## 2019-10-07 ENCOUNTER — APPOINTMENT (OUTPATIENT)
Dept: OBGYN | Facility: CLINIC | Age: 24
End: 2019-10-07
Payer: MEDICARE

## 2019-10-07 ENCOUNTER — OUTPATIENT (OUTPATIENT)
Dept: OUTPATIENT SERVICES | Facility: HOSPITAL | Age: 24
LOS: 1 days | Discharge: HOME | End: 2019-10-07

## 2019-10-07 ENCOUNTER — RESULT CHARGE (OUTPATIENT)
Age: 24
End: 2019-10-07

## 2019-10-07 ENCOUNTER — LABORATORY RESULT (OUTPATIENT)
Age: 24
End: 2019-10-07

## 2019-10-07 VITALS
WEIGHT: 124 LBS | SYSTOLIC BLOOD PRESSURE: 100 MMHG | BODY MASS INDEX: 21.97 KG/M2 | HEIGHT: 63 IN | DIASTOLIC BLOOD PRESSURE: 60 MMHG

## 2019-10-07 DIAGNOSIS — Z98.890 OTHER SPECIFIED POSTPROCEDURAL STATES: Chronic | ICD-10-CM

## 2019-10-07 DIAGNOSIS — O03.9 COMPLETE OR UNSPECIFIED SPONTANEOUS ABORTION WITHOUT COMPLICATION: Chronic | ICD-10-CM

## 2019-10-07 PROCEDURE — 0502F SUBSEQUENT PRENATAL CARE: CPT

## 2019-10-08 LAB
BILIRUB UR QL STRIP: NORMAL
CLARITY UR: CLEAR
COLLECTION METHOD: NORMAL
GLUCOSE UR-MCNC: NORMAL
HCG UR QL: 0.2 EU/DL
HGB UR QL STRIP.AUTO: NORMAL
KETONES UR-MCNC: NORMAL
LEUKOCYTE ESTERASE UR QL STRIP: NORMAL
NITRITE UR QL STRIP: NORMAL
PH UR STRIP: 6.5
PROT UR STRIP-MCNC: NORMAL
SP GR UR STRIP: 1.01

## 2019-10-09 DIAGNOSIS — Z34.90 ENCOUNTER FOR SUPERVISION OF NORMAL PREGNANCY, UNSPECIFIED, UNSPECIFIED TRIMESTER: ICD-10-CM

## 2019-10-10 ENCOUNTER — APPOINTMENT (OUTPATIENT)
Dept: OBGYN | Facility: CLINIC | Age: 24
End: 2019-10-10

## 2019-10-25 ENCOUNTER — OTHER (OUTPATIENT)
Age: 24
End: 2019-10-25

## 2019-10-25 RX ORDER — ONDANSETRON 4 MG/1
4 TABLET ORAL EVERY 8 HOURS
Qty: 40 | Refills: 3 | Status: ACTIVE | COMMUNITY
Start: 2019-08-12 | End: 1900-01-01

## 2019-11-04 ENCOUNTER — APPOINTMENT (OUTPATIENT)
Dept: OBGYN | Facility: CLINIC | Age: 24
End: 2019-11-04
Payer: MEDICARE

## 2019-11-04 ENCOUNTER — NON-APPOINTMENT (OUTPATIENT)
Age: 24
End: 2019-11-04

## 2019-11-04 ENCOUNTER — OUTPATIENT (OUTPATIENT)
Dept: OUTPATIENT SERVICES | Facility: HOSPITAL | Age: 24
LOS: 1 days | Discharge: HOME | End: 2019-11-04

## 2019-11-04 VITALS — DIASTOLIC BLOOD PRESSURE: 60 MMHG | BODY MASS INDEX: 22.85 KG/M2 | SYSTOLIC BLOOD PRESSURE: 100 MMHG | WEIGHT: 129 LBS

## 2019-11-04 DIAGNOSIS — Z33.2 ENCOUNTER FOR ELECTIVE TERMINATION OF PREGNANCY: ICD-10-CM

## 2019-11-04 DIAGNOSIS — O03.9 COMPLETE OR UNSPECIFIED SPONTANEOUS ABORTION WITHOUT COMPLICATION: Chronic | ICD-10-CM

## 2019-11-04 DIAGNOSIS — Z98.890 OTHER SPECIFIED POSTPROCEDURAL STATES: Chronic | ICD-10-CM

## 2019-11-04 LAB
BILIRUB UR QL STRIP: NORMAL
CLARITY UR: CLEAR
COLLECTION METHOD: NORMAL
GLUCOSE UR-MCNC: NORMAL
HCG UR QL: 0.2 EU/DL
HGB UR QL STRIP.AUTO: NORMAL
KETONES UR-MCNC: NORMAL
LEUKOCYTE ESTERASE UR QL STRIP: NORMAL
NITRITE UR QL STRIP: NORMAL
PH UR STRIP: 8
PROT UR STRIP-MCNC: NORMAL
SP GR UR STRIP: 1.01

## 2019-11-04 PROCEDURE — 0502F SUBSEQUENT PRENATAL CARE: CPT

## 2019-11-08 DIAGNOSIS — Z34.90 ENCOUNTER FOR SUPERVISION OF NORMAL PREGNANCY, UNSPECIFIED, UNSPECIFIED TRIMESTER: ICD-10-CM

## 2019-11-13 ENCOUNTER — OUTPATIENT (OUTPATIENT)
Dept: OUTPATIENT SERVICES | Facility: HOSPITAL | Age: 24
LOS: 1 days | Discharge: HOME | End: 2019-11-13

## 2019-11-13 ENCOUNTER — ASOB RESULT (OUTPATIENT)
Age: 24
End: 2019-11-13

## 2019-11-13 ENCOUNTER — APPOINTMENT (OUTPATIENT)
Dept: ANTEPARTUM | Facility: CLINIC | Age: 24
End: 2019-11-13
Payer: MEDICARE

## 2019-11-13 DIAGNOSIS — O03.9 COMPLETE OR UNSPECIFIED SPONTANEOUS ABORTION WITHOUT COMPLICATION: Chronic | ICD-10-CM

## 2019-11-13 DIAGNOSIS — Z98.890 OTHER SPECIFIED POSTPROCEDURAL STATES: Chronic | ICD-10-CM

## 2019-11-13 PROCEDURE — 76805 OB US >/= 14 WKS SNGL FETUS: CPT | Mod: 26

## 2019-11-15 DIAGNOSIS — Z36.3 ENCOUNTER FOR ANTENATAL SCREENING FOR MALFORMATIONS: ICD-10-CM

## 2019-11-15 DIAGNOSIS — Z3A.21 21 WEEKS GESTATION OF PREGNANCY: ICD-10-CM

## 2019-11-15 DIAGNOSIS — Z36.89 ENCOUNTER FOR OTHER SPECIFIED ANTENATAL SCREENING: ICD-10-CM

## 2019-11-15 DIAGNOSIS — O35.9XX0 MATERNAL CARE FOR (SUSPECTED) FETAL ABNORMALITY AND DAMAGE, UNSPECIFIED, NOT APPLICABLE OR UNSPECIFIED: ICD-10-CM

## 2019-11-27 ENCOUNTER — APPOINTMENT (OUTPATIENT)
Dept: ANTEPARTUM | Facility: CLINIC | Age: 24
End: 2019-11-27

## 2019-12-13 ENCOUNTER — APPOINTMENT (OUTPATIENT)
Dept: ANTEPARTUM | Facility: CLINIC | Age: 24
End: 2019-12-13

## 2019-12-16 ENCOUNTER — RESULT CHARGE (OUTPATIENT)
Age: 24
End: 2019-12-16

## 2019-12-16 ENCOUNTER — OUTPATIENT (OUTPATIENT)
Dept: OUTPATIENT SERVICES | Facility: HOSPITAL | Age: 24
LOS: 1 days | Discharge: HOME | End: 2019-12-16

## 2019-12-16 ENCOUNTER — NON-APPOINTMENT (OUTPATIENT)
Age: 24
End: 2019-12-16

## 2019-12-16 ENCOUNTER — APPOINTMENT (OUTPATIENT)
Dept: OBGYN | Facility: CLINIC | Age: 24
End: 2019-12-16
Payer: MEDICAID

## 2019-12-16 VITALS
BODY MASS INDEX: 23.92 KG/M2 | DIASTOLIC BLOOD PRESSURE: 60 MMHG | WEIGHT: 135 LBS | SYSTOLIC BLOOD PRESSURE: 100 MMHG | HEIGHT: 63 IN

## 2019-12-16 DIAGNOSIS — Z98.890 OTHER SPECIFIED POSTPROCEDURAL STATES: Chronic | ICD-10-CM

## 2019-12-16 DIAGNOSIS — O03.9 COMPLETE OR UNSPECIFIED SPONTANEOUS ABORTION WITHOUT COMPLICATION: Chronic | ICD-10-CM

## 2019-12-16 PROCEDURE — 0502F SUBSEQUENT PRENATAL CARE: CPT

## 2019-12-17 ENCOUNTER — APPOINTMENT (OUTPATIENT)
Dept: ANTEPARTUM | Facility: CLINIC | Age: 24
End: 2019-12-17

## 2019-12-18 DIAGNOSIS — Z34.90 ENCOUNTER FOR SUPERVISION OF NORMAL PREGNANCY, UNSPECIFIED, UNSPECIFIED TRIMESTER: ICD-10-CM

## 2019-12-19 LAB
BILIRUB UR QL STRIP: NORMAL
CLARITY UR: CLEAR
COLLECTION METHOD: NORMAL
GLUCOSE UR-MCNC: NORMAL
HCG UR QL: 0.2 EU/DL
HGB UR QL STRIP.AUTO: NORMAL
KETONES UR-MCNC: NORMAL
LEUKOCYTE ESTERASE UR QL STRIP: NORMAL
NITRITE UR QL STRIP: NORMAL
PH UR STRIP: 7.5
PROT UR STRIP-MCNC: NORMAL
SP GR UR STRIP: 1.02

## 2019-12-31 ENCOUNTER — ASOB RESULT (OUTPATIENT)
Age: 24
End: 2019-12-31

## 2019-12-31 ENCOUNTER — APPOINTMENT (OUTPATIENT)
Dept: ANTEPARTUM | Facility: CLINIC | Age: 24
End: 2019-12-31
Payer: MEDICAID

## 2019-12-31 ENCOUNTER — OUTPATIENT (OUTPATIENT)
Dept: OUTPATIENT SERVICES | Facility: HOSPITAL | Age: 24
LOS: 1 days | Discharge: HOME | End: 2019-12-31
Payer: MEDICARE

## 2019-12-31 VITALS
RESPIRATION RATE: 17 BRPM | SYSTOLIC BLOOD PRESSURE: 100 MMHG | DIASTOLIC BLOOD PRESSURE: 56 MMHG | TEMPERATURE: 98 F | HEART RATE: 68 BPM

## 2019-12-31 DIAGNOSIS — Z98.890 OTHER SPECIFIED POSTPROCEDURAL STATES: Chronic | ICD-10-CM

## 2019-12-31 DIAGNOSIS — Z3A.28 28 WEEKS GESTATION OF PREGNANCY: ICD-10-CM

## 2019-12-31 DIAGNOSIS — O03.9 COMPLETE OR UNSPECIFIED SPONTANEOUS ABORTION WITHOUT COMPLICATION: Chronic | ICD-10-CM

## 2019-12-31 DIAGNOSIS — Z04.89 ENCOUNTER FOR EXAMINATION AND OBSERVATION FOR OTHER SPECIFIED REASONS: ICD-10-CM

## 2019-12-31 LAB
APPEARANCE UR: CLEAR — SIGNIFICANT CHANGE UP
BACTERIA # UR AUTO: NEGATIVE — SIGNIFICANT CHANGE UP
BILIRUB UR-MCNC: NEGATIVE — SIGNIFICANT CHANGE UP
COLOR SPEC: YELLOW — SIGNIFICANT CHANGE UP
DIFF PNL FLD: NEGATIVE — SIGNIFICANT CHANGE UP
EPI CELLS # UR: 12 /HPF — HIGH (ref 0–5)
GLUCOSE UR QL: NEGATIVE — SIGNIFICANT CHANGE UP
HYALINE CASTS # UR AUTO: 6 /LPF — SIGNIFICANT CHANGE UP (ref 0–7)
KETONES UR-MCNC: ABNORMAL
LEUKOCYTE ESTERASE UR-ACNC: NEGATIVE — SIGNIFICANT CHANGE UP
NITRITE UR-MCNC: NEGATIVE — SIGNIFICANT CHANGE UP
PH UR: 6.5 — SIGNIFICANT CHANGE UP (ref 5–8)
PROT UR-MCNC: ABNORMAL
RBC CASTS # UR COMP ASSIST: 3 /HPF — SIGNIFICANT CHANGE UP (ref 0–4)
SP GR SPEC: 1.03 — HIGH (ref 1.01–1.02)
UROBILINOGEN FLD QL: ABNORMAL
WBC UR QL: 3 /HPF — SIGNIFICANT CHANGE UP (ref 0–5)

## 2019-12-31 PROCEDURE — 59025 FETAL NON-STRESS TEST: CPT | Mod: 26

## 2019-12-31 PROCEDURE — 76816 OB US FOLLOW-UP PER FETUS: CPT | Mod: 26,59

## 2019-12-31 PROCEDURE — 76815 OB US LIMITED FETUS(S): CPT | Mod: 26

## 2019-12-31 PROCEDURE — 99282 EMERGENCY DEPT VISIT SF MDM: CPT

## 2019-12-31 NOTE — OB PROVIDER TRIAGE NOTE - NSHPPHYSICALEXAM_GEN_ALL_CORE
07:30 Vital Signs Last 24 Hrs  T(C): 36.5 (31 Dec 2019 11:32), Max: 36.5 (31 Dec 2019 11:32)  T(F): 97.7 (31 Dec 2019 11:32), Max: 97.7 (31 Dec 2019 11:32)  HR: 68 (31 Dec 2019 11:32) (68 - 68)  BP: 100/56 (31 Dec 2019 11:32) (100/56 - 100/56)  RR: 17 (31 Dec 2019 11:32) (17 - 17)    Gen: AAOx3, NAD  Abd: soft, nontender, gravid, no palpable contractions  EFM: 145/mod ajith  [place]: irritable  SVE  TVUS  TAUS Vital Signs Last 24 Hrs  T(C): 36.5 (31 Dec 2019 11:32), Max: 36.5 (31 Dec 2019 11:32)  T(F): 97.7 (31 Dec 2019 11:32), Max: 97.7 (31 Dec 2019 11:32)  HR: 68 (31 Dec 2019 11:32) (68 - 68)  BP: 100/56 (31 Dec 2019 11:32) (100/56 - 100/56)  RR: 17 (31 Dec 2019 11:32) (17 - 17)    Gen: AAOx3, NAD  Abd: soft, nontender, gravid, no palpable contractions  EFM: 145/mod ajith  [place]: irritable  SVE: C/L/P  TVUS: Cvx 3.54cm  TAUS: deferred, she had a growth sono today  TAUS

## 2019-12-31 NOTE — OB PROVIDER TRIAGE NOTE - NSHPLABSRESULTS_GEN_ALL_CORE
Type and Screen: O pos  Antibody screen: neg   Rubella: immune  Measles: immune  Varicella: immune   RPR: neg  HbSAg: neg  HIV: NR  Gonorrhea: neg   Chlamydia: neg    [place]:  @8wks: LISSY c/w dating, SIUP  @ Type and Screen: O pos  Antibody screen: neg   Rubella: immune  Measles: immune  Varicella: immune   RPR: neg  HbSAg: neg  HIV: NR  Gonorrhea: neg   Chlamydia: neg    [place]:  5w6d: GS + YS  8wks: CRL c/w dating, SIUP  21w5d: ceph, anterior fundal placenta, MVP 5.13cm, EFW 446gms (45%), nml anatomy but incomplete visualization some cardiac structures   28w4d: ceph, anterior placenta, MVP 5.58cm, BPP 8/8, EFW Type and Screen: O pos  Antibody screen: neg   Rubella: immune  Measles: immune  Varicella: immune   RPR: neg  HbSAg: neg  HIV: NR  Gonorrhea: neg   Chlamydia: neg    [place]:  5w6d: GS + YS  8wks: CRL c/w dating, SIUP  21w5d: ceph, anterior fundal placenta, MVP 5.13cm, EFW 446gms (45%), nml anatomy but incomplete visualization some cardiac structures   28w4d: ceph, anterior placenta, MVP 5.58cm, BPP 8/8, EFW 1239gms (35%)

## 2019-12-31 NOTE — OB PROVIDER TRIAGE NOTE - PMH
ADHD    Chlamydia    Hemorrhagic cyst of left ovary    UTI (urinary tract infection) ADHD    Chlamydia    Trichomonal infection

## 2019-12-31 NOTE — OB PROVIDER TRIAGE NOTE - ADDITIONAL INSTRUCTIONS
Please follow-up with your OB during your next appointment. If you experience decreased fetal movement, leakage of fluid, vaginal bleeding, contractions, please return to L&D.

## 2019-12-31 NOTE — OB PROVIDER TRIAGE NOTE - NS_OBGYNHISTORY_OBGYN_ALL_OB_FT
OB Hx   ETOP x4 with D&C, all uncomplicated    GYN Hx  H/o chlamydia and trichomonas during this pregnancy. Denies fibroids, ovarian cysts, or abnormal papsmears. OB Hx   ETOP x4 with D&C, all uncomplicated    GYN Hx  H/o chlamydia not during this pregnancy and trichomonas during this pregnancy. Denies fibroids, ovarian cysts, or abnormal papsmears.

## 2019-12-31 NOTE — OB PROVIDER TRIAGE NOTE - NSOBPROVIDERNOTE_OBGYN_ALL_OB_FT
25yo  @28w4d, GBS unknown, with abdominal pain, not in  labor, likely round ligament pain, with reassuring maternal and fetal status, positive trichomonas s/p treatment without BLANCA  -d/c home   -follow up with OB during next appointment  -FKC encouraged, PTL precautions discussed  -PO tylenol, warm showers encouraged for round ligament pain  -f/u GC/CT, trich BLANCA  Dr. Manzano and Dr. Dewitt aware

## 2019-12-31 NOTE — OB PROVIDER TRIAGE NOTE - HISTORY OF PRESENT ILLNESS
25yo  @28w4d, YRN 3/20/20 by LMP c/w first trimester sonogram @8wks GA, presents to L&D with abdominal cramping for one week. She reports achey/sharp pain in the lower abdomen, non-radiating, comes and goes, 10/10 with movement, improves with rest and warm baths. She is unsure of the frequency. She denies VB or LOF. Reports good FM. She denies fever, chills, vomiting, CP, SOB, diarrhea, dysuria, abnormal vaginal discharge. Reports constipation, last BM was 3 days ago. Last intercourse was yesterday. Last PO intake was this morning, water and bread. Patient reports daily nausea, managed with PO zofran. She denies trauma to the abdomen. Otherwise feels well.

## 2020-01-03 DIAGNOSIS — R19.30 ABDOMINAL RIGIDITY, UNSPECIFIED SITE: ICD-10-CM

## 2020-01-03 DIAGNOSIS — O26.892 OTHER SPECIFIED PREGNANCY RELATED CONDITIONS, SECOND TRIMESTER: ICD-10-CM

## 2020-01-04 LAB
A VAGINAE DNA VAG QL NAA+PROBE: SIGNIFICANT CHANGE UP
BVAB2 DNA VAG QL NAA+PROBE: SIGNIFICANT CHANGE UP
C ALBICANS DNA VAG QL NAA+PROBE: POSITIVE
C GLABRATA DNA VAG QL NAA+PROBE: NEGATIVE — SIGNIFICANT CHANGE UP
C KRUSEI DNA VAG QL NAA+PROBE: NEGATIVE — SIGNIFICANT CHANGE UP
C LUSITANIAE DNA VAG QL NAA+PROBE: NEGATIVE — SIGNIFICANT CHANGE UP
C TRACH RRNA SPEC QL NAA+PROBE: NEGATIVE — SIGNIFICANT CHANGE UP
MEGA1 DNA VAG QL NAA+PROBE: ABNORMAL
N GONORRHOEA RRNA SPEC QL NAA+PROBE: NEGATIVE — SIGNIFICANT CHANGE UP
T VAGINALIS RRNA SPEC QL NAA+PROBE: NEGATIVE — SIGNIFICANT CHANGE UP

## 2020-01-04 RX ORDER — METRONIDAZOLE 500 MG
1 TABLET ORAL
Qty: 14 | Refills: 0
Start: 2020-01-04 | End: 2020-01-10

## 2020-01-04 NOTE — CHART NOTE - NSCHARTNOTEFT_GEN_A_CORE
Spoke to pt regarding results of vaginitis panel (positive for BV and candidiasis). Pt understands results. Terazol and Flagyl sent to pharmacy. Will take as directed.

## 2020-01-27 ENCOUNTER — RESULT CHARGE (OUTPATIENT)
Age: 25
End: 2020-01-27

## 2020-01-27 ENCOUNTER — OUTPATIENT (OUTPATIENT)
Dept: OUTPATIENT SERVICES | Facility: HOSPITAL | Age: 25
LOS: 1 days | Discharge: HOME | End: 2020-01-27

## 2020-01-27 ENCOUNTER — NON-APPOINTMENT (OUTPATIENT)
Age: 25
End: 2020-01-27

## 2020-01-27 ENCOUNTER — APPOINTMENT (OUTPATIENT)
Dept: OBGYN | Facility: CLINIC | Age: 25
End: 2020-01-27
Payer: MEDICAID

## 2020-01-27 VITALS
HEIGHT: 63 IN | BODY MASS INDEX: 24.8 KG/M2 | WEIGHT: 140 LBS | SYSTOLIC BLOOD PRESSURE: 100 MMHG | DIASTOLIC BLOOD PRESSURE: 60 MMHG

## 2020-01-27 DIAGNOSIS — Z98.890 OTHER SPECIFIED POSTPROCEDURAL STATES: Chronic | ICD-10-CM

## 2020-01-27 DIAGNOSIS — O03.9 COMPLETE OR UNSPECIFIED SPONTANEOUS ABORTION WITHOUT COMPLICATION: Chronic | ICD-10-CM

## 2020-01-27 PROBLEM — A59.9 TRICHOMONIASIS, UNSPECIFIED: Chronic | Status: ACTIVE | Noted: 2019-12-31

## 2020-01-27 LAB
BASOPHILS # BLD AUTO: 0.02 K/UL
BASOPHILS NFR BLD AUTO: 0.4 %
BILIRUB UR QL STRIP: NORMAL
CLARITY UR: CLEAR
COLLECTION METHOD: NORMAL
EOSINOPHIL # BLD AUTO: 0.04 K/UL
EOSINOPHIL NFR BLD AUTO: 0.8 %
GLUCOSE 1H P 50 G GLC PO SERPL-MCNC: 94 MG/DL
GLUCOSE UR-MCNC: NORMAL
HCG UR QL: 0.2 EU/DL
HCT VFR BLD CALC: 29.2 %
HGB BLD-MCNC: 9.4 G/DL
HGB UR QL STRIP.AUTO: NORMAL
IMM GRANULOCYTES NFR BLD AUTO: 0.4 %
KETONES UR-MCNC: NORMAL
LEUKOCYTE ESTERASE UR QL STRIP: NORMAL
LYMPHOCYTES # BLD AUTO: 1.64 K/UL
LYMPHOCYTES NFR BLD AUTO: 33.3 %
MAN DIFF?: NORMAL
MCHC RBC-ENTMCNC: 28.1 PG
MCHC RBC-ENTMCNC: 32.2 G/DL
MCV RBC AUTO: 87.2 FL
MONOCYTES # BLD AUTO: 0.64 K/UL
MONOCYTES NFR BLD AUTO: 13 %
NEUTROPHILS # BLD AUTO: 2.56 K/UL
NEUTROPHILS NFR BLD AUTO: 52.1 %
NITRITE UR QL STRIP: NORMAL
PH UR STRIP: 6.5
PLATELET # BLD AUTO: 215 K/UL
PROT UR STRIP-MCNC: NORMAL
RBC # BLD: 3.35 M/UL
RBC # FLD: 13.4 %
SP GR UR STRIP: 1.01
WBC # FLD AUTO: 4.92 K/UL

## 2020-01-27 PROCEDURE — 99212 OFFICE O/P EST SF 10 MIN: CPT

## 2020-01-27 PROCEDURE — 0502F SUBSEQUENT PRENATAL CARE: CPT

## 2020-01-27 RX ORDER — METRONIDAZOLE 7.5 MG/G
0.75 GEL VAGINAL
Qty: 1 | Refills: 6 | Status: ACTIVE | COMMUNITY
Start: 2020-01-27 | End: 1900-01-01

## 2020-01-28 ENCOUNTER — OUTPATIENT (OUTPATIENT)
Dept: OUTPATIENT SERVICES | Facility: HOSPITAL | Age: 25
LOS: 1 days | Discharge: HOME | End: 2020-01-28

## 2020-01-28 ENCOUNTER — ASOB RESULT (OUTPATIENT)
Age: 25
End: 2020-01-28

## 2020-01-28 ENCOUNTER — APPOINTMENT (OUTPATIENT)
Dept: ANTEPARTUM | Facility: CLINIC | Age: 25
End: 2020-01-28
Payer: MEDICAID

## 2020-01-28 DIAGNOSIS — O03.9 COMPLETE OR UNSPECIFIED SPONTANEOUS ABORTION WITHOUT COMPLICATION: Chronic | ICD-10-CM

## 2020-01-28 DIAGNOSIS — Z98.890 OTHER SPECIFIED POSTPROCEDURAL STATES: Chronic | ICD-10-CM

## 2020-01-28 DIAGNOSIS — Z34.90 ENCOUNTER FOR SUPERVISION OF NORMAL PREGNANCY, UNSPECIFIED, UNSPECIFIED TRIMESTER: ICD-10-CM

## 2020-01-28 DIAGNOSIS — N76.0 ACUTE VAGINITIS: ICD-10-CM

## 2020-01-28 PROCEDURE — 76816 OB US FOLLOW-UP PER FETUS: CPT | Mod: 26

## 2020-01-30 ENCOUNTER — OUTPATIENT (OUTPATIENT)
Dept: OUTPATIENT SERVICES | Facility: HOSPITAL | Age: 25
LOS: 1 days | Discharge: HOME | End: 2020-01-30
Payer: MEDICARE

## 2020-01-30 VITALS
DIASTOLIC BLOOD PRESSURE: 61 MMHG | RESPIRATION RATE: 18 BRPM | TEMPERATURE: 98 F | SYSTOLIC BLOOD PRESSURE: 108 MMHG | HEART RATE: 80 BPM

## 2020-01-30 VITALS — HEART RATE: 80 BPM | SYSTOLIC BLOOD PRESSURE: 108 MMHG | DIASTOLIC BLOOD PRESSURE: 61 MMHG

## 2020-01-30 DIAGNOSIS — Z98.890 OTHER SPECIFIED POSTPROCEDURAL STATES: Chronic | ICD-10-CM

## 2020-01-30 DIAGNOSIS — O03.9 COMPLETE OR UNSPECIFIED SPONTANEOUS ABORTION WITHOUT COMPLICATION: Chronic | ICD-10-CM

## 2020-01-30 PROCEDURE — 99282 EMERGENCY DEPT VISIT SF MDM: CPT | Mod: 25

## 2020-01-30 PROCEDURE — 59025 FETAL NON-STRESS TEST: CPT | Mod: 26

## 2020-01-30 PROCEDURE — 76815 OB US LIMITED FETUS(S): CPT | Mod: 26

## 2020-01-30 NOTE — OB PROVIDER TRIAGE NOTE - NSINFECTIONS_OBGYN_ALL_OB
Was the patient seen in the last year in this department? Yes    Does patient have an active prescription for medications requested? No     Received Request Via: Pharmacy   None

## 2020-01-30 NOTE — OB RN TRIAGE NOTE - CHIEF COMPLAINT QUOTE
domestic- Pt had argument w cousin @ home. (lives w her cousin) After argument she felt chest tightness and decreased fetal movement.
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
## 2020-01-30 NOTE — OB PROVIDER TRIAGE NOTE - NS_OBGYNHISTORY_OBGYN_ALL_OB_FT
ObHx: ETOPx4 with D&C    GynHx: H/o gonorrhea and chlamydia 2 years ago, BLANCA neg 8/12/19. Denies h/o fibroids, ovarian cysts, abnormal paps.

## 2020-01-30 NOTE — OB PROVIDER TRIAGE NOTE - NSHPLABSRESULTS_GEN_ALL_CORE
LABS  8/12/2019  HIV NR  GC/CT neg  Pap NILM  O pos, antibody screen  CF neg  SMA neg  Hemoglobin electrophoresis neg  Varicella immune  Measles immune  Rubella immune  Hep B sAg  Lead <1    GCT 94    SONOGRAMS  32w4d: vertex, ant placenta, MVP 4.86cm, EFW 2054g (47%)  28w4d: cephalic, ant placenta, MVP 5.58cm, EFW 1239g (35%)  21w5d: cephalic, ant placenta, MVP 5.13cm, EFW 446g (45%), fetal anatomy normal, CL 4.9cm  8w3d: SIUP, +FH, CRL c/w LMP

## 2020-01-30 NOTE — OB PROVIDER TRIAGE NOTE - ADDITIONAL INSTRUCTIONS
Follow up with your OBGYN at next scheduled appointment on 2/10. If you have any contractions, leaking of fluid, vaginal bleeding, or don't feel baby move as much, call your doctor or return to Labor and Delivery.

## 2020-01-30 NOTE — OB PROVIDER TRIAGE NOTE - NSOBPROVIDERNOTE_OBGYN_ALL_OB_FT
A/P: 25yo  at 32w6d, GBS unknown, h/o chlamydia and gonorrhea BLANCA neg, with decreased fetal movement now resolved, not in  labor, reassuring maternal and fetal status  - cleared by SW  -  labor precautions given  - [facility] instructions discussed  - PO hydration encouraged.  - discharge home A/P: 25yo  at 32w6d, GBS unknown, h/o chlamydia and gonorrhea BLANCA neg, with decreased fetal movement now resolved, not in  labor, reassuring maternal and fetal status  - cleared by SW  -  labor precautions given  - [facility] instructions discussed  - PO hydration encouraged.  - discharge home    Dr. Neal and Dr. Price A/P: 25yo  at 32w6d, GBS unknown, h/o chlamydia and gonorrhea BLANCA neg, with decreased fetal movement now resolved, not in  labor, reassuring maternal and fetal status  - cleared by SW  -  labor precautions given  - C instructions discussed  - PO hydration encouraged.  - discharge home    Dr. Neal and Dr. Price    Attending: Attestation signed  pt met  case reivewed  precautions given  nst reassuring  sono mvp >2cm

## 2020-01-30 NOTE — OB PROVIDER TRIAGE NOTE - NSHPPHYSICALEXAM_GEN_ALL_CORE
Vital Signs Last 24 Hrs  T(F): 98.4 (30 Jan 2020 11:23), Max: 98.4 (30 Jan 2020 11:23)  HR: 80 (30 Jan 2020 11:23) (80 - 80)  BP: 108/61 (30 Jan 2020 11:23) (108/61 - 108/61)  RR: 18 (30 Jan 2020 11:23) (18 - 18)    Gen: AOx3, no acute distress  CVS: RRR  Lungs: CTABL  Abd: soft, gravid, non tender, no palpable contractions, no suprapubic tenderness, no CVA  Ext: No edema bilaterally    EFM: 135/mod/+accels; cat 1  [place]: none  Speculum: metrogel visualized in vagina, no discharge, no bleeding, no lesions, cervix appears closed.  TVUS: CL 4.25cm  SVE: C/L/P  TAUS: cephalic, anterior placenta, MVP 5.35cm, BPP 10/10 Vital Signs Last 24 Hrs  T(F): 98.4 (30 Jan 2020 11:23), Max: 98.4 (30 Jan 2020 11:23)  HR: 80 (30 Jan 2020 11:23) (80 - 80)  BP: 108/61 (30 Jan 2020 11:23) (108/61 - 108/61)  RR: 18 (30 Jan 2020 11:23) (18 - 18)    Denies the following: constitutional symptoms, visual symptoms, cardiovascular symptoms, respiratory symptoms, GI symptoms, musculoskeletal symptoms, skin symptoms, neurologic symptoms, hematologic symptoms, allergic symptoms, or psychiatric symptoms, except any pertinent positives listed.    Gen: AOx3, no acute distress  CVS: RRR  Lungs: CTABL  Abd: soft, gravid, non tender, no palpable contractions, no suprapubic tenderness, no CVA  Ext: No edema bilaterally, neg homans  skin:nl    EFM: 135/mod/+accels; cat 1  [place]: none  Speculum: metrogel visualized in vagina, no discharge, no bleeding, no lesions, cervix appears closed.  TVUS: CL 4.25cm  SVE: C/L/P  TAUS: cephalic, anterior placenta, MVP 5.35cm, BPP 10/10

## 2020-02-03 LAB
A VAGINAE DNA VAG QL NAA+PROBE: NORMAL
BVAB2 DNA VAG QL NAA+PROBE: NORMAL
C KRUSEI DNA VAG QL NAA+PROBE: NEGATIVE
C TRACH RRNA SPEC QL NAA+PROBE: NEGATIVE
MEGA1 DNA VAG QL NAA+PROBE: ABNORMAL
N GONORRHOEA RRNA SPEC QL NAA+PROBE: NEGATIVE
T VAGINALIS RRNA SPEC QL NAA+PROBE: NEGATIVE

## 2020-02-10 ENCOUNTER — APPOINTMENT (OUTPATIENT)
Dept: OBGYN | Facility: CLINIC | Age: 25
End: 2020-02-10
Payer: MEDICAID

## 2020-02-10 ENCOUNTER — OUTPATIENT (OUTPATIENT)
Dept: OUTPATIENT SERVICES | Facility: HOSPITAL | Age: 25
LOS: 1 days | Discharge: HOME | End: 2020-02-10

## 2020-02-10 ENCOUNTER — NON-APPOINTMENT (OUTPATIENT)
Age: 25
End: 2020-02-10

## 2020-02-10 ENCOUNTER — RESULT CHARGE (OUTPATIENT)
Age: 25
End: 2020-02-10

## 2020-02-10 VITALS
DIASTOLIC BLOOD PRESSURE: 68 MMHG | WEIGHT: 143 LBS | HEIGHT: 63 IN | BODY MASS INDEX: 25.34 KG/M2 | SYSTOLIC BLOOD PRESSURE: 108 MMHG

## 2020-02-10 DIAGNOSIS — Z98.890 OTHER SPECIFIED POSTPROCEDURAL STATES: Chronic | ICD-10-CM

## 2020-02-10 DIAGNOSIS — O03.9 COMPLETE OR UNSPECIFIED SPONTANEOUS ABORTION WITHOUT COMPLICATION: Chronic | ICD-10-CM

## 2020-02-10 DIAGNOSIS — A74.9 CHLAMYDIAL INFECTION, UNSPECIFIED: ICD-10-CM

## 2020-02-10 LAB
BILIRUB UR QL STRIP: NORMAL
CLARITY UR: CLEAR
COLLECTION METHOD: NORMAL
GLUCOSE UR-MCNC: NORMAL
HCG UR QL: 0.2 EU/DL
HGB UR QL STRIP.AUTO: NORMAL
KETONES UR-MCNC: NORMAL
LEUKOCYTE ESTERASE UR QL STRIP: NORMAL
NITRITE UR QL STRIP: NORMAL
PH UR STRIP: 7.5
PROT UR STRIP-MCNC: NORMAL
SP GR UR STRIP: 1.01

## 2020-02-10 PROCEDURE — 0502F SUBSEQUENT PRENATAL CARE: CPT

## 2020-02-11 ENCOUNTER — NON-APPOINTMENT (OUTPATIENT)
Age: 25
End: 2020-02-11

## 2020-02-24 ENCOUNTER — APPOINTMENT (OUTPATIENT)
Dept: OBGYN | Facility: CLINIC | Age: 25
End: 2020-02-24
Payer: MEDICAID

## 2020-02-24 ENCOUNTER — NON-APPOINTMENT (OUTPATIENT)
Age: 25
End: 2020-02-24

## 2020-02-24 ENCOUNTER — RESULT CHARGE (OUTPATIENT)
Age: 25
End: 2020-02-24

## 2020-02-24 ENCOUNTER — OUTPATIENT (OUTPATIENT)
Dept: OUTPATIENT SERVICES | Facility: HOSPITAL | Age: 25
LOS: 1 days | Discharge: HOME | End: 2020-02-24

## 2020-02-24 VITALS
BODY MASS INDEX: 25.87 KG/M2 | SYSTOLIC BLOOD PRESSURE: 108 MMHG | HEIGHT: 63 IN | DIASTOLIC BLOOD PRESSURE: 68 MMHG | WEIGHT: 146 LBS

## 2020-02-24 DIAGNOSIS — O03.9 COMPLETE OR UNSPECIFIED SPONTANEOUS ABORTION WITHOUT COMPLICATION: Chronic | ICD-10-CM

## 2020-02-24 DIAGNOSIS — Z98.890 OTHER SPECIFIED POSTPROCEDURAL STATES: Chronic | ICD-10-CM

## 2020-02-24 PROCEDURE — 0502F SUBSEQUENT PRENATAL CARE: CPT

## 2020-02-26 DIAGNOSIS — Z34.90 ENCOUNTER FOR SUPERVISION OF NORMAL PREGNANCY, UNSPECIFIED, UNSPECIFIED TRIMESTER: ICD-10-CM

## 2020-02-27 LAB
C TRACH RRNA SPEC QL NAA+PROBE: NOT DETECTED
GP B STREP DNA SPEC QL NAA+PROBE: NORMAL
GP B STREP DNA SPEC QL NAA+PROBE: NOT DETECTED
N GONORRHOEA RRNA SPEC QL NAA+PROBE: NOT DETECTED
SOURCE AMPLIFICATION: NORMAL
SOURCE GBS: NORMAL

## 2020-03-01 LAB
BASOPHILS # BLD AUTO: 0.02 K/UL
BASOPHILS NFR BLD AUTO: 0.4 %
EOSINOPHIL # BLD AUTO: 0.04 K/UL
EOSINOPHIL NFR BLD AUTO: 0.8 %
HCT VFR BLD CALC: 31.2 %
HGB BLD-MCNC: 10 G/DL
HIV1+2 AB SPEC QL IA.RAPID: NONREACTIVE
IMM GRANULOCYTES NFR BLD AUTO: 0.4 %
LYMPHOCYTES # BLD AUTO: 1.68 K/UL
LYMPHOCYTES NFR BLD AUTO: 34.6 %
MAN DIFF?: NORMAL
MCHC RBC-ENTMCNC: 28.4 PG
MCHC RBC-ENTMCNC: 32.1 G/DL
MCV RBC AUTO: 88.6 FL
MONOCYTES # BLD AUTO: 0.63 K/UL
MONOCYTES NFR BLD AUTO: 13 %
NEUTROPHILS # BLD AUTO: 2.46 K/UL
NEUTROPHILS NFR BLD AUTO: 50.8 %
PLATELET # BLD AUTO: 217 K/UL
RBC # BLD: 3.52 M/UL
RBC # FLD: 13.8 %
WBC # FLD AUTO: 4.85 K/UL

## 2020-03-09 ENCOUNTER — OUTPATIENT (OUTPATIENT)
Dept: OUTPATIENT SERVICES | Facility: HOSPITAL | Age: 25
LOS: 1 days | Discharge: HOME | End: 2020-03-09

## 2020-03-09 ENCOUNTER — APPOINTMENT (OUTPATIENT)
Dept: OBGYN | Facility: CLINIC | Age: 25
End: 2020-03-09
Payer: MEDICAID

## 2020-03-09 ENCOUNTER — NON-APPOINTMENT (OUTPATIENT)
Age: 25
End: 2020-03-09

## 2020-03-09 ENCOUNTER — RESULT CHARGE (OUTPATIENT)
Age: 25
End: 2020-03-09

## 2020-03-09 VITALS
HEIGHT: 63 IN | SYSTOLIC BLOOD PRESSURE: 112 MMHG | DIASTOLIC BLOOD PRESSURE: 72 MMHG | BODY MASS INDEX: 27.01 KG/M2 | WEIGHT: 152.44 LBS

## 2020-03-09 DIAGNOSIS — O03.9 COMPLETE OR UNSPECIFIED SPONTANEOUS ABORTION WITHOUT COMPLICATION: Chronic | ICD-10-CM

## 2020-03-09 DIAGNOSIS — Z98.890 OTHER SPECIFIED POSTPROCEDURAL STATES: Chronic | ICD-10-CM

## 2020-03-09 LAB
BILIRUB UR QL STRIP: NORMAL
CLARITY UR: CLEAR
COLLECTION METHOD: NORMAL
GLUCOSE UR-MCNC: NORMAL
HCG UR QL: 0.2 EU/DL
HGB UR QL STRIP.AUTO: NORMAL
KETONES UR-MCNC: NORMAL
LEUKOCYTE ESTERASE UR QL STRIP: NORMAL
NITRITE UR QL STRIP: NORMAL
PH UR STRIP: 6
PROT UR STRIP-MCNC: NORMAL
SP GR UR STRIP: 1.01

## 2020-03-09 PROCEDURE — 59426 ANTEPARTUM CARE ONLY: CPT

## 2020-03-09 PROCEDURE — 0502F SUBSEQUENT PRENATAL CARE: CPT

## 2020-03-10 DIAGNOSIS — Z34.90 ENCOUNTER FOR SUPERVISION OF NORMAL PREGNANCY, UNSPECIFIED, UNSPECIFIED TRIMESTER: ICD-10-CM

## 2020-03-21 ENCOUNTER — OUTPATIENT (OUTPATIENT)
Dept: OUTPATIENT SERVICES | Facility: HOSPITAL | Age: 25
LOS: 1 days | Discharge: HOME | End: 2020-03-21
Payer: MEDICAID

## 2020-03-21 VITALS — TEMPERATURE: 98 F

## 2020-03-21 VITALS
HEART RATE: 109 BPM | RESPIRATION RATE: 18 BRPM | SYSTOLIC BLOOD PRESSURE: 106 MMHG | DIASTOLIC BLOOD PRESSURE: 71 MMHG | TEMPERATURE: 98 F

## 2020-03-21 DIAGNOSIS — O03.9 COMPLETE OR UNSPECIFIED SPONTANEOUS ABORTION WITHOUT COMPLICATION: Chronic | ICD-10-CM

## 2020-03-21 DIAGNOSIS — Z98.890 OTHER SPECIFIED POSTPROCEDURAL STATES: Chronic | ICD-10-CM

## 2020-03-21 PROCEDURE — 99282 EMERGENCY DEPT VISIT SF MDM: CPT | Mod: 25

## 2020-03-21 PROCEDURE — 76818 FETAL BIOPHYS PROFILE W/NST: CPT | Mod: 26

## 2020-03-22 NOTE — OB PROVIDER TRIAGE NOTE - NSOBPROVIDERNOTE_OBGYN_ALL_OB_FT
A/P: 25yo  at 40w1d, GBS unknown, mitul, not in labor, BPP 8/8, reassuring maternal and fetal status  - labor precautions given  - [facility] instructions discussed  - PO hydration encouraged.  - f/u with OB during appointment in 2 days to discuss possible IOL  - discharge home    Dr. Zafar and Dr. Melendrez aware A/P: 23yo  at 40w2d, GBS unknown, mitul, not in labor, BPP 8/8, reassuring maternal and fetal status  - labor precautions given  - [facility] instructions discussed  - PO hydration encouraged.  - f/u with OB during appointment in 2 days to discuss possible IOL  - discharge home    Dr. Zafar and Dr. Melendrez aware A/P: 25yo  at 40w1d, GBS unknown, mitul, not in labor, BPP 8/8, reassuring maternal and fetal status    - labor precautions given  - [facility] instructions discussed  - PO hydration encouraged.  - f/u with OB during appointment in 2 days to discuss possible IOL  - discharge home    Dr. Zafar and Dr. Melendrez aware

## 2020-03-22 NOTE — OB PROVIDER TRIAGE NOTE - NSHPLABSRESULTS_GEN_ALL_CORE
LABS  8/12/2019  HIV NR  GC/CT neg  Pap NILM  O pos, antibody screen  CF neg  SMA neg  Hemoglobin electrophoresis neg  Varicella immune  Measles immune  Rubella immune  Hep B sAg  Lead <1    GCT 94    2/24/2020  GBS neg  GC/CT neg  HIV NR    SONOGRAMS  32w4d: vertex, ant placenta, MVP 4.86cm, EFW 2054g (47%)  28w4d: cephalic, ant placenta, MVP 5.58cm, EFW 1239g (35%)  21w5d: cephalic, ant placenta, MVP 5.13cm, EFW 446g (45%), fetal anatomy normal, CL 4.9cm  8w3d: SIUP, +FH, CRL c/w LMP

## 2020-03-22 NOTE — OB PROVIDER TRIAGE NOTE - HISTORY OF PRESENT ILLNESS
23yo  at 40w1d EDC 3/20/20 by LMP c/w first trimester sono presents to L&D with contractions since this morning @0800. She reports they are q5-10 mins, 7/10 intensity. Denies VB or LOF. Reports good FM. Denies any complications with this pregnancy.

## 2020-03-22 NOTE — OB PROVIDER TRIAGE NOTE - NS_OBGYNHISTORY_OBGYN_ALL_OB_FT
ObHx: ETOPx5 with D&C    GynHx: H/o gonorrhea and chlamydia 2 years ago, BLANCA neg 8/12/19. Denies h/o fibroids, ovarian cysts, abnormal paps. ObHx: ETOPx5 with D&C    GynHx: H/o gonorrhea and chlamydia 2 years ago, BLANCA neg. Denies h/o fibroids, ovarian cysts, abnormal paps. ObHx: ETOPx5 with D&C    GynHx: H/o gonorrhea and chlamydia 2 years ago, BLANCA neg. H/o trichomonas. Denies h/o fibroids, ovarian cysts, abnormal paps.

## 2020-03-22 NOTE — OB PROVIDER TRIAGE NOTE - ADDITIONAL INSTRUCTIONS
Follow up with your OBGYN at next scheduled appointment on 3/23. If you have any contractions, leaking of fluid, vaginal bleeding, or don't feel baby move as much, call your doctor or return to Labor and Delivery.

## 2020-03-22 NOTE — OB PROVIDER TRIAGE NOTE - NSHPPHYSICALEXAM_GEN_ALL_CORE
Vital Signs Last 24 Hrs  T(C): 36.6 (21 Mar 2020 23:49), Max: 36.6 (21 Mar 2020 23:26)  T(F): 97.9 (21 Mar 2020 23:49), Max: 97.9 (21 Mar 2020 23:26)  HR: 109 (21 Mar 2020 23:49) (109 - 109) Manual check  BP: 106/71 (21 Mar 2020 23:49) (106/71 - 106/71)  RR: 18 (21 Mar 2020 23:49) (18 - 18)    Denies the following: constitutional symptoms, visual symptoms, cardiovascular symptoms, respiratory symptoms, GI symptoms, musculoskeletal symptoms, skin symptoms, neurologic symptoms, hematologic symptoms, allergic symptoms, or psychiatric symptoms, except any pertinent positives listed.    Gen: AOx3, no acute distress  CVS: RRR  Lungs: CTABL  Abd: soft, gravid, non tender, no palpable contractions, no suprapubic tenderness, no CVA  Ext: No edema bilaterally, neg homans    EFM: 135/mod/+accels; cat 1  [place]: none  SVE: 1/30/-3, vtx, intact  BSS: ant placenta, cephalic, MVP ---, BPP 8/8 Vital Signs Last 24 Hrs  T(C): 36.6 (21 Mar 2020 23:49), Max: 36.6 (21 Mar 2020 23:26)  T(F): 97.9 (21 Mar 2020 23:49), Max: 97.9 (21 Mar 2020 23:26)  HR: 109 (21 Mar 2020 23:49) (109 - 109) Manual check 96bpm  BP: 106/71 (21 Mar 2020 23:49) (106/71 - 106/71)  RR: 18 (21 Mar 2020 23:49) (18 - 18)    Denies the following: constitutional symptoms, visual symptoms, cardiovascular symptoms, respiratory symptoms, GI symptoms, musculoskeletal symptoms, skin symptoms, neurologic symptoms, hematologic symptoms, allergic symptoms, or psychiatric symptoms, except any pertinent positives listed.    Gen: AOx3, no acute distress  CVS: RRR  Lungs: CTABL  Abd: soft, gravid, non tender, + palpable contractions, no suprapubic tenderness, no CVA  Ext: No edema bilaterally, neg homans    EFM: 135/mod/+accels; cat 1  [place]: irregularly  SVE: 1/30/-3, vtx, intact  BSS: ant placenta, cephalic, MVP 3.95cm, BPP 8/8 Vital Signs Last 24 Hrs  T(C): 36.6 (21 Mar 2020 23:49), Max: 36.6 (21 Mar 2020 23:26)  T(F): 97.9 (21 Mar 2020 23:49), Max: 97.9 (21 Mar 2020 23:26)  HR: 109 (21 Mar 2020 23:49) (109 - 109) Manual check 96bpm  BP: 106/71 (21 Mar 2020 23:49) (106/71 - 106/71)  RR: 18 (21 Mar 2020 23:49) (18 - 18)    Denies the following: constitutional symptoms, visual symptoms, cardiovascular symptoms, respiratory symptoms, GI symptoms, musculoskeletal symptoms, skin symptoms, neurologic symptoms, hematologic symptoms, allergic symptoms, or psychiatric symptoms, except any pertinent positives listed.    Gen: AOx3, no acute distress  CVS: RRR  Lungs: CTABL  Abd: soft, gravid, non tender, + palpable contractions, no suprapubic tenderness, no CVA  Ext: No edema bilaterally, neg Homans    EFM: 135/mod/+accels; cat 1  [place]: irregularly  SVE: 1/30/-3, vtx, intact  BSS: ant placenta, cephalic, MVP 3.95cm, BPP 8/8

## 2020-03-22 NOTE — CHART NOTE - NSCHARTNOTEFT_GEN_A_CORE
Biophysical Profile 3/22 [id number]    Amniotic fluid: pocket >2cm two planes  Fetal movement: observed  Fetal Breathing: observed  Fetal Tone: observed    NST    Baseline: 125  Variability: moderate  Accelerations: present  Decelerations: none    [place]: q4min    Cat I - reactive

## 2020-03-23 ENCOUNTER — OUTPATIENT (OUTPATIENT)
Dept: OUTPATIENT SERVICES | Facility: HOSPITAL | Age: 25
LOS: 1 days | Discharge: HOME | End: 2020-03-23

## 2020-03-23 ENCOUNTER — APPOINTMENT (OUTPATIENT)
Dept: OBGYN | Facility: CLINIC | Age: 25
End: 2020-03-23
Payer: MEDICAID

## 2020-03-23 ENCOUNTER — NON-APPOINTMENT (OUTPATIENT)
Age: 25
End: 2020-03-23

## 2020-03-23 ENCOUNTER — RESULT CHARGE (OUTPATIENT)
Age: 25
End: 2020-03-23

## 2020-03-23 VITALS
WEIGHT: 156 LBS | BODY MASS INDEX: 27.64 KG/M2 | DIASTOLIC BLOOD PRESSURE: 70 MMHG | HEIGHT: 63 IN | SYSTOLIC BLOOD PRESSURE: 118 MMHG

## 2020-03-23 DIAGNOSIS — O03.9 COMPLETE OR UNSPECIFIED SPONTANEOUS ABORTION WITHOUT COMPLICATION: Chronic | ICD-10-CM

## 2020-03-23 DIAGNOSIS — Z98.890 OTHER SPECIFIED POSTPROCEDURAL STATES: Chronic | ICD-10-CM

## 2020-03-23 LAB
BILIRUB UR QL STRIP: NORMAL
CLARITY UR: CLEAR
COLLECTION METHOD: NORMAL
GLUCOSE UR-MCNC: NORMAL
HCG UR QL: 0.2 EU/DL
HGB UR QL STRIP.AUTO: NORMAL
KETONES UR-MCNC: NORMAL
LEUKOCYTE ESTERASE UR QL STRIP: NORMAL
NITRITE UR QL STRIP: NORMAL
PH UR STRIP: 6
PROT UR STRIP-MCNC: NORMAL
SP GR UR STRIP: 1.02

## 2020-03-23 PROCEDURE — 0502F SUBSEQUENT PRENATAL CARE: CPT

## 2020-03-25 ENCOUNTER — INPATIENT (INPATIENT)
Facility: HOSPITAL | Age: 25
LOS: 2 days | Discharge: HOME | End: 2020-03-28
Attending: OBSTETRICS & GYNECOLOGY | Admitting: OBSTETRICS & GYNECOLOGY
Payer: MEDICAID

## 2020-03-25 VITALS — SYSTOLIC BLOOD PRESSURE: 131 MMHG | DIASTOLIC BLOOD PRESSURE: 83 MMHG | HEART RATE: 95 BPM

## 2020-03-25 DIAGNOSIS — Z98.890 OTHER SPECIFIED POSTPROCEDURAL STATES: Chronic | ICD-10-CM

## 2020-03-25 DIAGNOSIS — O03.9 COMPLETE OR UNSPECIFIED SPONTANEOUS ABORTION WITHOUT COMPLICATION: Chronic | ICD-10-CM

## 2020-03-25 LAB
AMPHET UR-MCNC: NEGATIVE — SIGNIFICANT CHANGE UP
APPEARANCE UR: CLEAR — SIGNIFICANT CHANGE UP
BACTERIA # UR AUTO: ABNORMAL
BARBITURATES UR SCN-MCNC: NEGATIVE — SIGNIFICANT CHANGE UP
BASOPHILS # BLD AUTO: 0.02 K/UL — SIGNIFICANT CHANGE UP (ref 0–0.2)
BASOPHILS NFR BLD AUTO: 0.5 % — SIGNIFICANT CHANGE UP (ref 0–1)
BENZODIAZ UR-MCNC: NEGATIVE — SIGNIFICANT CHANGE UP
BILIRUB UR-MCNC: NEGATIVE — SIGNIFICANT CHANGE UP
BLD GP AB SCN SERPL QL: SIGNIFICANT CHANGE UP
BUPRENORPHINE SCREEN, URINE RESULT: NEGATIVE — SIGNIFICANT CHANGE UP
COCAINE METAB.OTHER UR-MCNC: NEGATIVE — SIGNIFICANT CHANGE UP
COLOR SPEC: YELLOW — SIGNIFICANT CHANGE UP
DIFF PNL FLD: NEGATIVE — SIGNIFICANT CHANGE UP
EOSINOPHIL # BLD AUTO: 0.03 K/UL — SIGNIFICANT CHANGE UP (ref 0–0.7)
EOSINOPHIL NFR BLD AUTO: 0.7 % — SIGNIFICANT CHANGE UP (ref 0–8)
EPI CELLS # UR: 8 /HPF — HIGH (ref 0–5)
FENTANYL UR QL: NEGATIVE — SIGNIFICANT CHANGE UP
GLUCOSE UR QL: NEGATIVE — SIGNIFICANT CHANGE UP
HCT VFR BLD CALC: 39.6 % — SIGNIFICANT CHANGE UP (ref 37–47)
HGB BLD-MCNC: 12.9 G/DL — SIGNIFICANT CHANGE UP (ref 12–16)
HYALINE CASTS # UR AUTO: 3 /LPF — SIGNIFICANT CHANGE UP (ref 0–7)
IMM GRANULOCYTES NFR BLD AUTO: 0.2 % — SIGNIFICANT CHANGE UP (ref 0.1–0.3)
KETONES UR-MCNC: NEGATIVE — SIGNIFICANT CHANGE UP
L&D DRUG SCREEN, URINE: SIGNIFICANT CHANGE UP
LEUKOCYTE ESTERASE UR-ACNC: NEGATIVE — SIGNIFICANT CHANGE UP
LYMPHOCYTES # BLD AUTO: 1.52 K/UL — SIGNIFICANT CHANGE UP (ref 1.2–3.4)
LYMPHOCYTES # BLD AUTO: 34.9 % — SIGNIFICANT CHANGE UP (ref 20.5–51.1)
MCHC RBC-ENTMCNC: 29.4 PG — SIGNIFICANT CHANGE UP (ref 27–31)
MCHC RBC-ENTMCNC: 32.6 G/DL — SIGNIFICANT CHANGE UP (ref 32–37)
MCV RBC AUTO: 90.2 FL — SIGNIFICANT CHANGE UP (ref 81–99)
METHADONE UR-MCNC: NEGATIVE — SIGNIFICANT CHANGE UP
MONOCYTES # BLD AUTO: 0.54 K/UL — SIGNIFICANT CHANGE UP (ref 0.1–0.6)
MONOCYTES NFR BLD AUTO: 12.4 % — HIGH (ref 1.7–9.3)
NEUTROPHILS # BLD AUTO: 2.23 K/UL — SIGNIFICANT CHANGE UP (ref 1.4–6.5)
NEUTROPHILS NFR BLD AUTO: 51.3 % — SIGNIFICANT CHANGE UP (ref 42.2–75.2)
NITRITE UR-MCNC: NEGATIVE — SIGNIFICANT CHANGE UP
NRBC # BLD: 0 /100 WBCS — SIGNIFICANT CHANGE UP (ref 0–0)
OPIATES UR-MCNC: NEGATIVE — SIGNIFICANT CHANGE UP
OXYCODONE UR-MCNC: NEGATIVE — SIGNIFICANT CHANGE UP
PCP UR-MCNC: NEGATIVE — SIGNIFICANT CHANGE UP
PH UR: 6.5 — SIGNIFICANT CHANGE UP (ref 5–8)
PLATELET # BLD AUTO: 197 K/UL — SIGNIFICANT CHANGE UP (ref 130–400)
PROPOXYPHENE QUALITATIVE URINE RESULT: NEGATIVE — SIGNIFICANT CHANGE UP
PROT UR-MCNC: ABNORMAL
RBC # BLD: 4.39 M/UL — SIGNIFICANT CHANGE UP (ref 4.2–5.4)
RBC # FLD: 13.2 % — SIGNIFICANT CHANGE UP (ref 11.5–14.5)
RBC CASTS # UR COMP ASSIST: 10 /HPF — HIGH (ref 0–4)
SP GR SPEC: 1.02 — SIGNIFICANT CHANGE UP (ref 1.01–1.02)
UROBILINOGEN FLD QL: SIGNIFICANT CHANGE UP
WBC # BLD: 4.35 K/UL — LOW (ref 4.8–10.8)
WBC # FLD AUTO: 4.35 K/UL — LOW (ref 4.8–10.8)
WBC UR QL: 3 /HPF — SIGNIFICANT CHANGE UP (ref 0–5)

## 2020-03-25 RX ORDER — DEXAMETHASONE 0.5 MG/5ML
4 ELIXIR ORAL EVERY 6 HOURS
Refills: 0 | Status: DISCONTINUED | OUTPATIENT
Start: 2020-03-25 | End: 2020-03-26

## 2020-03-25 RX ORDER — GENTAMICIN SULFATE 40 MG/ML
80 VIAL (ML) INJECTION ONCE
Refills: 0 | Status: COMPLETED | OUTPATIENT
Start: 2020-03-25 | End: 2020-03-25

## 2020-03-25 RX ORDER — ACETAMINOPHEN 500 MG
650 TABLET ORAL EVERY 6 HOURS
Refills: 0 | Status: DISCONTINUED | OUTPATIENT
Start: 2020-03-25 | End: 2020-03-26

## 2020-03-25 RX ORDER — ONDANSETRON 8 MG/1
4 TABLET, FILM COATED ORAL EVERY 6 HOURS
Refills: 0 | Status: DISCONTINUED | OUTPATIENT
Start: 2020-03-25 | End: 2020-03-28

## 2020-03-25 RX ORDER — ACETAMINOPHEN 500 MG
1000 TABLET ORAL ONCE
Refills: 0 | Status: COMPLETED | OUTPATIENT
Start: 2020-03-25 | End: 2020-03-25

## 2020-03-25 RX ORDER — BUPIVACAINE HCL/PF 7.5 MG/ML
200 VIAL (ML) INJECTION
Refills: 0 | Status: DISCONTINUED | OUTPATIENT
Start: 2020-03-25 | End: 2020-03-26

## 2020-03-25 RX ORDER — OXYTOCIN 10 UNIT/ML
2 VIAL (ML) INJECTION
Qty: 30 | Refills: 0 | Status: DISCONTINUED | OUTPATIENT
Start: 2020-03-25 | End: 2020-03-25

## 2020-03-25 RX ORDER — AMPICILLIN TRIHYDRATE 250 MG
CAPSULE ORAL
Refills: 0 | Status: DISCONTINUED | OUTPATIENT
Start: 2020-03-25 | End: 2020-03-27

## 2020-03-25 RX ORDER — GENTAMICIN SULFATE 40 MG/ML
80 VIAL (ML) INJECTION EVERY 8 HOURS
Refills: 0 | Status: DISCONTINUED | OUTPATIENT
Start: 2020-03-26 | End: 2020-03-27

## 2020-03-25 RX ORDER — SODIUM CHLORIDE 9 MG/ML
1000 INJECTION, SOLUTION INTRAVENOUS
Refills: 0 | Status: DISCONTINUED | OUTPATIENT
Start: 2020-03-25 | End: 2020-03-26

## 2020-03-25 RX ORDER — OXYTOCIN 10 UNIT/ML
333.33 VIAL (ML) INJECTION
Qty: 20 | Refills: 0 | Status: DISCONTINUED | OUTPATIENT
Start: 2020-03-25 | End: 2020-03-26

## 2020-03-25 RX ORDER — NALOXONE HYDROCHLORIDE 4 MG/.1ML
0.1 SPRAY NASAL
Refills: 0 | Status: DISCONTINUED | OUTPATIENT
Start: 2020-03-25 | End: 2020-03-28

## 2020-03-25 RX ORDER — AMPICILLIN TRIHYDRATE 250 MG
2 CAPSULE ORAL EVERY 6 HOURS
Refills: 0 | Status: DISCONTINUED | OUTPATIENT
Start: 2020-03-26 | End: 2020-03-27

## 2020-03-25 RX ORDER — AMPICILLIN TRIHYDRATE 250 MG
2 CAPSULE ORAL ONCE
Refills: 0 | Status: COMPLETED | OUTPATIENT
Start: 2020-03-25 | End: 2020-03-25

## 2020-03-25 RX ORDER — OXYTOCIN 10 UNIT/ML
2 VIAL (ML) INJECTION
Qty: 30 | Refills: 0 | Status: DISCONTINUED | OUTPATIENT
Start: 2020-03-25 | End: 2020-03-26

## 2020-03-25 RX ORDER — GENTAMICIN SULFATE 40 MG/ML
VIAL (ML) INJECTION
Refills: 0 | Status: DISCONTINUED | OUTPATIENT
Start: 2020-03-25 | End: 2020-03-27

## 2020-03-25 RX ORDER — INFLUENZA VIRUS VACCINE 15; 15; 15; 15 UG/.5ML; UG/.5ML; UG/.5ML; UG/.5ML
0.5 SUSPENSION INTRAMUSCULAR ONCE
Refills: 0 | Status: COMPLETED | OUTPATIENT
Start: 2020-03-25 | End: 2020-03-25

## 2020-03-25 RX ADMIN — Medication 204 MILLIGRAM(S): at 23:00

## 2020-03-25 RX ADMIN — Medication 2 MILLIUNIT(S)/MIN: at 09:41

## 2020-03-25 RX ADMIN — SODIUM CHLORIDE 125 MILLILITER(S): 9 INJECTION, SOLUTION INTRAVENOUS at 22:20

## 2020-03-25 RX ADMIN — Medication 1000 MILLIGRAM(S): at 23:08

## 2020-03-25 RX ADMIN — Medication 216 GRAM(S): at 23:45

## 2020-03-25 RX ADMIN — Medication 400 MILLIGRAM(S): at 22:38

## 2020-03-25 NOTE — OB RN PATIENT PROFILE - HEART RATE (BEATS/MIN)
Patient was offered choice of vendor and chose [facility].  Patient Robin Salcido was set up at [facility] on February 7, 2017. Patient received a Resmed AirSense 10 Auto. Pressures were set at 5-15 cm H2O.   Patient s ramp is 5 cm H2O for Off and FLEX/EPR is A Flex, EPR.  Patient received a Resmed Mask name: Airfit F20  Full Face mask Size Medium, heated tubing and heated humidifier.  Patient is enrolled in the STM Program and does not need to meet compliance. Patient has a follow up on TBD with Dr. Kiki Lara Her    106

## 2020-03-25 NOTE — PROGRESS NOTE ADULT - ASSESSMENT
23 yo P0, 40w5d, GBS neg, IOL for post edc, on pitocin s/p epidural and AROM    continue IVF, LLP, O2 mask   If still cat 2 tracing in 15 mins, will discontinue pitocin    Dr. Hinojosa aware

## 2020-03-25 NOTE — OB PROVIDER H&P - ASSESSMENT
24y  @ 40.5 weeks, GBS negative, in early labor.     Admit to L & D  IV hydration, labs  Continuous EFM & TOCO monitoring  Clear Liquid diet  Pain Management PRN  IOL w/ Pitcocin    Dr. Bravo aware

## 2020-03-25 NOTE — OB PROVIDER TRIAGE NOTE - NS_VISITREASON1_OBGYN_ALL_OB
Quality 111:Pneumonia Vaccination Status For Older Adults: Pneumococcal Vaccination Previously Received Quality 110: Preventive Care And Screening: Influenza Immunization: Influenza Immunization previously received during influenza season Detail Level: Zone Labor at Term

## 2020-03-25 NOTE — PROCEDURAL SAFETY CHECKLIST WITH OR WITHOUT SEDATION - [id number]_GEN_ALL_CORE
When a new surgeon arrives and is assuming primary responsibility for the case/Invasive procedure performed prior to the primary surgical procedure

## 2020-03-25 NOTE — OB PROVIDER TRIAGE NOTE - NSHPPHYSICALEXAM_GEN_ALL_CORE
T(C): 36.8 (03-25-20 @ 08:03), Max: 36.8 (03-25-20 @ 08:03)  HR: 86 (03-25-20 @ 08:03) (86 - 95)  BP: 116/80 (03-25-20 @ 08:03) (116/80 - 131/83)  RR: 16 (03-25-20 @ 08:03) (16 - 16)    EFM: 130 bpm, moderate variability, +accels  TOCO: q 5 mins

## 2020-03-25 NOTE — OB PROVIDER TRIAGE NOTE - HISTORY OF PRESENT ILLNESS
24y  @ 40.5 weeks by LMP, EDC, present for ctx. Contractions began 0630, q 5 mins, 10/10 intensity. States she passed mucous plug then felt like she 'pee'd' on herself. GBS _. Denies VB. +FM. No complications with this pregnancy. Last seen at [facility] on 3/23/2020, exam was 1 cm.

## 2020-03-25 NOTE — OB PROVIDER H&P - NSHPLABSRESULTS_GEN_ALL_CORE
GCT: 94    11/13/19: 21.5 weeks:  gms (45%), no major malformations noted  12/31/19: EFW 1239 gms (35%), MVP 5.58, no major fetal malformations noted  1/28/2020: EFW 2054 gms (47%), MVP 4.86

## 2020-03-25 NOTE — OB RN PATIENT PROFILE - EXTENSIONS OF SELF_ADULT
----- Message from Flaco Natarajan MD sent at 11/8/2019 12:07 PM EST -----  Please let the patient know his cardiac monitor looked good with no significant abnormal heart rhythms.  In addition, his echocardiogram and stress test look good with normal heart function and no evidence of significant blockage.  We will see him back in 1 year for his regular follow-up appointment, as scheduled.  If he has recurrent episodes of passing out, he should call our clinic and we will see him sooner.      Thanks  
Patient notified of result. Patient states he has had blacking out spells and dizziness so I offered an appointment as told.  Patient requested appointment tomorrow since he already has an appointment in [place], Patient is scheduled to See [facility] tomorrow 11/13/19 @ 2:30  
Eyeglasses

## 2020-03-25 NOTE — PROGRESS NOTE ADULT - SUBJECTIVE AND OBJECTIVE BOX
Patient seen at bedside, complaining of pain.    T(C): 36.8 (03-25-20 @ 08:49), Max: 36.8 (03-25-20 @ 08:03)  HR: 95 (03-25-20 @ 16:47) (68 - 106)  BP: 102/67 (03-25-20 @ 16:47) (96/56 - 135/92)  RR: 18 (03-25-20 @ 08:49) (16 - 18)  SpO2: 97% (03-25-20 @ 16:52) (95% - 99%)    EFM: 130/mod/+accels  TOCO: q 2-3 mins  SVE: 4/70/-2    Meds:  BUpivacaine 0.1% in 0.9% Sodium Chloride PCEA 200 milliLiter(s) Epidural PCA Continuous  dexAMETHasone  Injectable 4 milliGRAM(s) IV Push every 6 hours PRN  influenza   Vaccine 0.5 milliLiter(s) IntraMuscular once  lactated ringers. 1000 milliLiter(s) IV Continuous <Continuous>  naloxone Injectable 0.1 milliGRAM(s) IV Push every 3 minutes PRN  ondansetron Injectable 4 milliGRAM(s) IV Push every 6 hours PRN  oxytocin Infusion 333.333 milliUNIT(s)/Min IV Continuous <Continuous>  oxytocin Infusion 2 milliUNIT(s)/Min IV Continuous <Continuous>    Labs:                        12.9   4.35  )-----------( 197      ( 25 Mar 2020 10:24 )             39.6     Type + Screen (03.25.20 @ 10:24)    ABO RH Interpretation: O POS    Urinalysis (03.25.20 @ 10:24)    Glucose Qualitative, Urine: Negative    Blood, Urine: Negative    pH Urine: 6.5    Color: Yellow    Urine Appearance: Clear    Bilirubin: Negative    Ketone - Urine: Negative    Specific Gravity: 1.024    Protein, Urine: 30 mg/dL    Urobilinogen: <2 mg/dL    Nitrite: Negative    Leukocyte Esterase Concentration: Negative      A/P: +  23 yo P0, 40w5d, GBS neg, IOL for post edc, on pitocin, s/p epidural and AROM.    Plan:  Call for Top off  Continue EFM/TOCO  Continue IV hydration  Continue Pitocin  Epidural in Place  Pain management PRN    Dr. Bravo aware

## 2020-03-25 NOTE — OB PROVIDER H&P - NSHPPHYSICALEXAM_GEN_ALL_CORE
T(C): 36.8 (03-25-20 @ 08:03), Max: 36.8 (03-25-20 @ 08:03)  HR: 83 (03-25-20 @ 08:19) (83 - 95)  BP: 132/86 (03-25-20 @ 08:19) (116/80 - 132/86)  RR: 16 (03-25-20 @ 08:03) (16 - 16)    EFM: 130 bpm, moderate variability, +accels  TOCO: q 5 mins

## 2020-03-25 NOTE — PROGRESS NOTE ADULT - SUBJECTIVE AND OBJECTIVE BOX
PGY2 Note    Patient seen at bedside for evaluation. She feels chills, contractions pain and constant rectal pressure.  On further questioning, patient reports sore throat for 1 week, in the mornings, currently not with sore throat. Denies previous fevers, chest pain, SOB, coughing, contact with covid infected patients.    Vitals  T(F): 98.24 ( @ 21:40), Max: 98.78 ( @ 15:16)  HR: 129 ( @ 22:23)  BP: 121/86 ( @ 22:05) (96/56 - 140/58)  RR: 18 ( @ 21:40)  EFM: 180/mod/prolonged deceleration lasting 3min with rafa of 90bpm recovered to baseline @1015 as patient was vomiting  TOCO: q 2-4min  SVE: /0    Medications:  lactated ringers.: 125 ( @ 09:31)  oxytocin Infusion: 2 ( @ 09:34) Pitocin off at this time  Epidural    Labs:                        12.9   4.35  )-----------( 197      ( 25 Mar 2020 10:24 )             39.6         UDS neg, Opos  Antibody Screen: NEG (20 @ 10:24)    Urinalysis Basic - ( 25 Mar 2020 10:24 )    Color: Yellow / Appearance: Clear / S.024 / pH: x  Gluc: x / Ketone: Negative  / Bili: Negative / Urobili: <2 mg/dL   Blood: x / Protein: 30 mg/dL / Nitrite: Negative   Leuk Esterase: Negative / RBC: 10 /HPF / WBC 3 /HPF   Sq Epi: x / Non Sq Epi: 8 /HPF / Bacteria: Few

## 2020-03-25 NOTE — PROGRESS NOTE ADULT - SUBJECTIVE AND OBJECTIVE BOX
PGY 4 note    evaluated for labor progression and late decelerations s/p epi    ICU Vital Signs Last 24 Hrs  T(C): 36.8 (25 Mar 2020 08:49), Max: 36.8 (25 Mar 2020 08:03)  T(F): 98.3 (25 Mar 2020 08:49), Max: 98.3 (25 Mar 2020 08:03)  HR: 86 (25 Mar 2020 13:12) (69 - 106)  BP: 108/67 (25 Mar 2020 13:03) (102/66 - 135/92)  BP(mean): --  ABP: --  ABP(mean): --  RR: 18 (25 Mar 2020 08:49) (16 - 18)  SpO2: 97% (25 Mar 2020 13:17) (96% - 99%)    EFM: 140/mod ajith/+accels  TOCO q2-3 mins  SVE: 2/60/-2, vtx, AROM clear        medications  oxytocin Infusion   2 mL/Hr IV Continuous (03-25-20 @ 09:34), now on 8mu/min  s/p epidural

## 2020-03-25 NOTE — OB PROVIDER TRIAGE NOTE - NS_OBGYNHISTORY_OBGYN_ALL_OB_FT
OB Hx: ETOP x 5 w/ D&C x 5    Gyn Hx: h/o Gonorrhea and Chlamydia, not in this pregnancy  Denies cysts, fibroids, abnormal pap.

## 2020-03-25 NOTE — PROGRESS NOTE ADULT - ASSESSMENT
23 yo P0, 40w5d, GBS neg, undergoing IOL for post edc with pitocin, now shut off, s/p epidural and AROM @1310 light [facility],, now with chorioamnionitis, with fever and sore throat, rule out COVID19    Discussed with attending, command center and nursing staff, general agreement to transfer patient to isolation room on contact, droplet and airborne precautions, to be swabbed for COVID19    Keep pitocin off until tracing improves  Continuous EFM/TOCO  Continue IV hydration  Pain management PRN  Amp/Gent  UV tylenol  Zofran PRN  Isolation room, f/u COVID 19 swab    Dr. Boggs and Dr. Ashish Bravo aware

## 2020-03-25 NOTE — OB PROVIDER TRIAGE NOTE - PMH
Pt reports his wife Reji is in the waiting room. RN visualized pt wife and she stated that she would be bringing pt home.    ADHD    Chlamydia    Trichomonal infection

## 2020-03-25 NOTE — OB PROVIDER H&P - HISTORY OF PRESENT ILLNESS
24y  @ 40.5 weeks by LMP, EDC 3/20/2020, present for ctx. Contractions began 0630, q 5 mins, 10/10 intensity. States she passed mucous plug then felt like she 'pee'd' on herself. GBS negative. Denies VB. +FM. No complications with this pregnancy. Last seen at [facility] on 3/23/2020, exam was 1 cm.

## 2020-03-25 NOTE — PROGRESS NOTE ADULT - SUBJECTIVE AND OBJECTIVE BOX
PGY1 Note    Patient seen at bedside for evaluation of labor progression, doing well, no complaints.     Vital Signs Last 24 Hrs  T(C): 37.1 (25 Mar 2020 15:16), Max: 37.1 (25 Mar 2020 15:16)  T(F): 98.78 (25 Mar 2020 15:16), Max: 98.78 (25 Mar 2020 15:16)  HR: 72 (25 Mar 2020 19:22) (68 - 127)  BP: 124/59 (25 Mar 2020 19:05) (96/56 - 140/58)  RR: 18 (25 Mar 2020 08:49) (16 - 18)  SpO2: 98% (25 Mar 2020 19:17) (86% - 99%)    EFM: 135/mod ajith/+accels  TOCO: q2-5 mins  SVE: 5/80/-1, vtx, AROM light [facility] 3/25@1310    Medications:  oxytocin Infusion: 2 mL/Hr (20 @ 09:34) currently @10mU/min  Epidural started @1230      Labs:                        12.9   4.35  )-----------( 197      ( 25 Mar 2020 10:24 )             39.6           ABO RH Interpretation: O POS (20 @ 10:24)    Antibody Screen: NEG (20 @ 10:24)    Urinalysis Basic - ( 25 Mar 2020 10:24 )    Color: Yellow / Appearance: Clear / S.024 / pH: x  Gluc: x / Ketone: Negative  / Bili: Negative / Urobili: <2 mg/dL   Blood: x / Protein: 30 mg/dL / Nitrite: Negative   Leuk Esterase: Negative / RBC: 10 /HPF / WBC 3 /HPF   Sq Epi: x / Non Sq Epi: 8 /HPF / Bacteria: Few      L&amp;D Drug Screen, Urine: Done (20 @ 10:24)              A/P  25 yo P0, 40w5d, GBS neg, IOL for post edc, on pitocin, s/p epidural and AROM, doing well    dc pitocin d/t recurrent late decels  Continue EFM/TOCO  Continue IV hydration  Pain management PRN  f/u UDS  reassess    Dr. Mireles and  aware

## 2020-03-25 NOTE — OB RN PATIENT PROFILE - NS PRO RUBELLA RECEIVED Y/N
section Discharge Summary    Date of Admission: 2018  Date of Discharge:  2019      Patient: Lavonne Rocha      MR#:[id number]    Surgeon/OB: Annette oGmez MD    Discharge Diagnosis:  section at 37w1d, uncomplicated recovery    Procedures:  , Low Transverse     2019    1:30 AM      Anesthesia:  Epidural     Presenting Problem/History of Present Illness  Pregnancy [Z34.90]  Elevated blood pressure affecting pregnancy in third trimester, antepartum [O16.3]     Patient Active Problem List   Diagnosis   • Bulging lumbar disc   • Hyperesthesia   • Degeneration of intervertebral disc of lumbar region   • Lumbar facet arthropathy   • Lumbar radiculitis   • Chronic bilateral low back pain with sciatica   • PCOS (polycystic ovarian syndrome)   • Obesity in pregnancy   • Nausea and vomiting of pregnancy, antepartum   • Hypertension in pregnancy, essential, antepartum   • GERD without esophagitis   • Hemorrhoids during pregnancy in second trimester   • Constipation during pregnancy in second trimester   • Pregnancy   • Thrombocytopenia affecting pregnancy (CMS/HCC)   • Cramping affecting pregnancy, antepartum   • SGA (small for gestational age)   • Poor fetal growth affecting management of mother in third trimester   • Anemia affecting pregnancy in third trimester   • Hypertension affecting pregnancy   • Group B Streptococcus carrier, +RV culture, currently pregnant   • Elevated blood pressure affecting pregnancy in third trimester, antepartum       Hospital Course  Patient is a 24 y.o. female  at 37w1d status post  section with uneventful postoperative recovery.  Patient was advanced to regular diet on postoperative day#1.  On discharge, ambulating, tolerating a regular diet without any difficulties and her incision is dry, clean and intact.     Labs and blood pressure stable during the hospitalization.     Infant:   female  fetus 2620 g (5 lb 12.4 oz)  with Apgar  scores of 9  , 9   at five minutes.    Condition on Discharge:  Stable    Vital Signs  Temp:  [97.7 °F (36.5 °C)-98.1 °F (36.7 °C)] 97.7 °F (36.5 °C)  Heart Rate:  [55-88] 55  Resp:  [16-20] 16  BP: (118-136)/(70-74) 118/70    Lab Results   Component Value Date    WBC 9.36 01/03/2019    HGB 9.5 (L) 01/03/2019    HCT 30.3 (L) 01/03/2019    MCV 87.3 01/03/2019     (L) 01/03/2019       Discharge Disposition  Home or Self Care    Discharge Medications     Your medication list      START taking these medications      Instructions Last Dose Given Next Dose Due   ibuprofen 600 MG tablet  Commonly known as:  ADVIL,MOTRIN      Take 1 tablet by mouth Every 8 (Eight) Hours As Needed for Mild Pain .       oxyCODONE-acetaminophen 5-325 MG per tablet  Commonly known as:  PERCOCET      Take 1 tablet by mouth Every 4 (Four) Hours As Needed for Severe Pain .          CONTINUE taking these medications      Instructions Last Dose Given Next Dose Due   acetaminophen 500 MG tablet  Commonly known as:  TYLENOL      Take 1,000 mg by mouth Every 6 (Six) Hours As Needed for Mild Pain .          ASK your doctor about these medications      Instructions Last Dose Given Next Dose Due   aspirin 81 MG tablet      Take 1 tablet by mouth Daily.       CLARITIN PO      Take  by mouth.       ferrous sulfate 325 (65 FE) MG tablet      Take 325 mg by mouth Daily With Breakfast.       fluticasone 50 MCG/ACT nasal spray  Commonly known as:  FLONASE      2 sprays into the nostril(s) as directed by provider Daily.       hydrocortisone 2.5 % rectal cream  Commonly known as:  ANUSOL-HC      Apply rectally 2 times daily       omeprazole 20 MG capsule  Commonly known as:  PRILOSEC      Take 1 capsule by mouth Daily.       prenatal (CLASSIC) vitamin 28-0.8 MG tablet tablet      Take 1 tablet by mouth Daily.       promethazine 25 MG tablet  Commonly known as:  PHENERGAN      Take 25 mg by mouth Every 6 (Six) Hours As Needed for Nausea or Vomiting.        sennosides-docusate sodium 8.6-50 MG tablet  Commonly known as:  SENOKOT-S      Take 2 tablets by mouth Daily.             Where to Get Your Medications      [place] [facility] [address] [facility] - [phone]  - [phone]   [address] [facility] [address]    Phone:  [phone]   · fluticasone 50 MCG/ACT nasal spray  · ibuprofen 600 MG tablet  · oxyCODONE-acetaminophen 5-325 MG per tablet           Discharge Diet: Regular    Follow-up Appointments  No future appointments.  Additional Instructions for the Follow-ups that You Need to Schedule     Call MD for problems / concerns.   As directed      Instructions:    1.  Call for heavy vaginal bleeding (greater than 2 pads an hours for 2 hours)  2.  Fever above 101.3  3.  Incisional redness    Order Comments:  Instructions:  1.  Call for heavy vaginal bleeding (greater than 2 pads an hours for 2 hours) 2.  Fever above 101.3 3.  Incisional redness                Prenatal labs/vax: O pos  RI  VI  Immunization History   Administered Date(s) Administered   • Hepatitis A 04/26/2018   • Tdap 10/26/2018   • flucelvax quad pfs =>4 YRS 09/28/2018         Yomi Telles MD  1/5/2019  11:03 AM     no...

## 2020-03-26 ENCOUNTER — RESULT REVIEW (OUTPATIENT)
Age: 25
End: 2020-03-26

## 2020-03-26 ENCOUNTER — APPOINTMENT (OUTPATIENT)
Dept: ANTEPARTUM | Facility: CLINIC | Age: 25
End: 2020-03-26

## 2020-03-26 LAB
FLU A RESULT: NEGATIVE — SIGNIFICANT CHANGE UP
FLU A RESULT: NEGATIVE — SIGNIFICANT CHANGE UP
FLUAV AG NPH QL: NEGATIVE — SIGNIFICANT CHANGE UP
FLUBV AG NPH QL: NEGATIVE — SIGNIFICANT CHANGE UP
RSV RESULT: NEGATIVE — SIGNIFICANT CHANGE UP
RSV RNA RESP QL NAA+PROBE: NEGATIVE — SIGNIFICANT CHANGE UP
SARS-COV-2 RNA SPEC QL NAA+PROBE: SIGNIFICANT CHANGE UP

## 2020-03-26 PROCEDURE — 88307 TISSUE EXAM BY PATHOLOGIST: CPT | Mod: 26

## 2020-03-26 PROCEDURE — 59514 CESAREAN DELIVERY ONLY: CPT | Mod: U9

## 2020-03-26 RX ORDER — ONDANSETRON 8 MG/1
4 TABLET, FILM COATED ORAL ONCE
Refills: 0 | Status: DISCONTINUED | OUTPATIENT
Start: 2020-03-26 | End: 2020-03-28

## 2020-03-26 RX ORDER — ACETAMINOPHEN 500 MG
650 TABLET ORAL ONCE
Refills: 0 | Status: COMPLETED | OUTPATIENT
Start: 2020-03-26 | End: 2020-03-26

## 2020-03-26 RX ORDER — KETOROLAC TROMETHAMINE 30 MG/ML
30 SYRINGE (ML) INJECTION EVERY 6 HOURS
Refills: 0 | Status: DISCONTINUED | OUTPATIENT
Start: 2020-03-26 | End: 2020-03-27

## 2020-03-26 RX ORDER — FAMOTIDINE 10 MG/ML
20 INJECTION INTRAVENOUS ONCE
Refills: 0 | Status: COMPLETED | OUTPATIENT
Start: 2020-03-26 | End: 2020-03-26

## 2020-03-26 RX ORDER — ENOXAPARIN SODIUM 100 MG/ML
40 INJECTION SUBCUTANEOUS AT BEDTIME
Refills: 0 | Status: DISCONTINUED | OUTPATIENT
Start: 2020-03-26 | End: 2020-03-28

## 2020-03-26 RX ORDER — OXYCODONE HYDROCHLORIDE 5 MG/1
5 TABLET ORAL
Refills: 0 | Status: DISCONTINUED | OUTPATIENT
Start: 2020-03-26 | End: 2020-03-28

## 2020-03-26 RX ORDER — ACETAMINOPHEN 500 MG
1000 TABLET ORAL ONCE
Refills: 0 | Status: DISCONTINUED | OUTPATIENT
Start: 2020-03-26 | End: 2020-03-28

## 2020-03-26 RX ORDER — IBUPROFEN 200 MG
600 TABLET ORAL EVERY 6 HOURS
Refills: 0 | Status: COMPLETED | OUTPATIENT
Start: 2020-03-26 | End: 2021-02-22

## 2020-03-26 RX ORDER — MORPHINE SULFATE 50 MG/1
2 CAPSULE, EXTENDED RELEASE ORAL EVERY 4 HOURS
Refills: 0 | Status: DISCONTINUED | OUTPATIENT
Start: 2020-03-26 | End: 2020-03-28

## 2020-03-26 RX ORDER — CITRIC ACID/SODIUM CITRATE 300-500 MG
30 SOLUTION, ORAL ORAL ONCE
Refills: 0 | Status: DISCONTINUED | OUTPATIENT
Start: 2020-03-26 | End: 2020-03-26

## 2020-03-26 RX ORDER — OXYCODONE HYDROCHLORIDE 5 MG/1
5 TABLET ORAL ONCE
Refills: 0 | Status: DISCONTINUED | OUTPATIENT
Start: 2020-03-26 | End: 2020-03-28

## 2020-03-26 RX ORDER — LANOLIN
1 OINTMENT (GRAM) TOPICAL EVERY 6 HOURS
Refills: 0 | Status: DISCONTINUED | OUTPATIENT
Start: 2020-03-26 | End: 2020-03-28

## 2020-03-26 RX ORDER — MORPHINE SULFATE 50 MG/1
3 CAPSULE, EXTENDED RELEASE ORAL ONCE
Refills: 0 | Status: CANCELLED | OUTPATIENT
Start: 2020-03-26 | End: 2020-03-28

## 2020-03-26 RX ORDER — ACETAMINOPHEN 500 MG
975 TABLET ORAL
Refills: 0 | Status: DISCONTINUED | OUTPATIENT
Start: 2020-03-26 | End: 2020-03-28

## 2020-03-26 RX ORDER — MAGNESIUM HYDROXIDE 400 MG/1
30 TABLET, CHEWABLE ORAL
Refills: 0 | Status: DISCONTINUED | OUTPATIENT
Start: 2020-03-26 | End: 2020-03-28

## 2020-03-26 RX ORDER — SODIUM CHLORIDE 9 MG/ML
1000 INJECTION, SOLUTION INTRAVENOUS
Refills: 0 | Status: DISCONTINUED | OUTPATIENT
Start: 2020-03-26 | End: 2020-03-28

## 2020-03-26 RX ORDER — OXYTOCIN 10 UNIT/ML
333.33 VIAL (ML) INJECTION
Qty: 20 | Refills: 0 | Status: DISCONTINUED | OUTPATIENT
Start: 2020-03-26 | End: 2020-03-28

## 2020-03-26 RX ORDER — ONDANSETRON 8 MG/1
4 TABLET, FILM COATED ORAL EVERY 6 HOURS
Refills: 0 | Status: CANCELLED | OUTPATIENT
Start: 2020-03-26 | End: 2020-03-28

## 2020-03-26 RX ORDER — NALOXONE HYDROCHLORIDE 4 MG/.1ML
0.1 SPRAY NASAL
Refills: 0 | Status: CANCELLED | OUTPATIENT
Start: 2020-03-26 | End: 2020-03-28

## 2020-03-26 RX ORDER — DIPHENHYDRAMINE HCL 50 MG
25 CAPSULE ORAL EVERY 6 HOURS
Refills: 0 | Status: DISCONTINUED | OUTPATIENT
Start: 2020-03-26 | End: 2020-03-28

## 2020-03-26 RX ORDER — GLYCERIN ADULT
1 SUPPOSITORY, RECTAL RECTAL AT BEDTIME
Refills: 0 | Status: DISCONTINUED | OUTPATIENT
Start: 2020-03-26 | End: 2020-03-28

## 2020-03-26 RX ORDER — HYDROMORPHONE HYDROCHLORIDE 2 MG/ML
1 INJECTION INTRAMUSCULAR; INTRAVENOUS; SUBCUTANEOUS
Refills: 0 | Status: DISCONTINUED | OUTPATIENT
Start: 2020-03-26 | End: 2020-03-28

## 2020-03-26 RX ORDER — OXYTOCIN 10 UNIT/ML
333.33 VIAL (ML) INJECTION
Qty: 20 | Refills: 0 | Status: DISCONTINUED | OUTPATIENT
Start: 2020-03-26 | End: 2020-03-26

## 2020-03-26 RX ORDER — DEXAMETHASONE 0.5 MG/5ML
4 ELIXIR ORAL EVERY 6 HOURS
Refills: 0 | Status: CANCELLED | OUTPATIENT
Start: 2020-03-26 | End: 2020-03-28

## 2020-03-26 RX ORDER — SIMETHICONE 80 MG/1
80 TABLET, CHEWABLE ORAL EVERY 4 HOURS
Refills: 0 | Status: DISCONTINUED | OUTPATIENT
Start: 2020-03-26 | End: 2020-03-28

## 2020-03-26 RX ORDER — HYDROMORPHONE HYDROCHLORIDE 2 MG/ML
0.5 INJECTION INTRAMUSCULAR; INTRAVENOUS; SUBCUTANEOUS
Refills: 0 | Status: DISCONTINUED | OUTPATIENT
Start: 2020-03-26 | End: 2020-03-26

## 2020-03-26 RX ORDER — METOCLOPRAMIDE HCL 10 MG
10 TABLET ORAL ONCE
Refills: 0 | Status: DISCONTINUED | OUTPATIENT
Start: 2020-03-26 | End: 2020-03-26

## 2020-03-26 RX ADMIN — Medication 204 MILLIGRAM(S): at 15:53

## 2020-03-26 RX ADMIN — Medication 975 MILLIGRAM(S): at 21:41

## 2020-03-26 RX ADMIN — Medication 204 MILLIGRAM(S): at 22:27

## 2020-03-26 RX ADMIN — Medication 100 MILLIGRAM(S): at 10:01

## 2020-03-26 RX ADMIN — ENOXAPARIN SODIUM 40 MILLIGRAM(S): 100 INJECTION SUBCUTANEOUS at 22:45

## 2020-03-26 RX ADMIN — Medication 975 MILLIGRAM(S): at 21:42

## 2020-03-26 RX ADMIN — Medication 204 MILLIGRAM(S): at 06:55

## 2020-03-26 RX ADMIN — Medication 650 MILLIGRAM(S): at 06:03

## 2020-03-26 RX ADMIN — Medication 216 GRAM(S): at 05:54

## 2020-03-26 RX ADMIN — Medication 100 MILLIGRAM(S): at 18:20

## 2020-03-26 RX ADMIN — Medication 30 MILLIGRAM(S): at 18:20

## 2020-03-26 RX ADMIN — MORPHINE SULFATE 2 MILLIGRAM(S): 50 CAPSULE, EXTENDED RELEASE ORAL at 12:57

## 2020-03-26 RX ADMIN — Medication 216 GRAM(S): at 12:33

## 2020-03-26 RX ADMIN — Medication 216 GRAM(S): at 18:19

## 2020-03-26 RX ADMIN — Medication 650 MILLIGRAM(S): at 05:33

## 2020-03-26 RX ADMIN — FAMOTIDINE 20 MILLIGRAM(S): 10 INJECTION INTRAVENOUS at 09:40

## 2020-03-26 NOTE — PROGRESS NOTE ADULT - SUBJECTIVE AND OBJECTIVE BOX
Pt evalauted at bedside, c/o pain and pressure.    T(C): 37.2 (20 @ 06:50), Max: 38.3 (20 @ 05:30)  HR: 100 (20 @ 07:47) (68 - 137)  BP: 127/70 (20 @ 07:47) (96/56 - 140/58)  RR: 18 (20 @ 06:50) (16 - 18)  SpO2: 97% (20 @ 07:52) (85% - 100%)    VE: 8/90/-1  Ctx: q 2-6 min  FHR: 145 moderate variability, +accels, +variable decels    pt has O2 by face mask and has received IV bolus    Pitocin @ 4 milliunits    A/P: 24y.o.  @ 40.6wks IOL for post-dates, Chorioamnionitis vs PUI for Covid-19  - Epidural top-off  - Will evaluate pt in 1 hour  - Continue pitocin  - Continuous efm and toco  - Continue O2  - Dr. Hale aware

## 2020-03-26 NOTE — CHART NOTE - NSCHARTNOTEFT_GEN_A_CORE
PACU ANESTHESIA ADMISSION NOTE      Procedure:  section    Post op diagnosis:  Arrest of dilation, delivered, current hospitalization      ____  Intubated  TV:______       Rate: ______      FiO2: ______    __x__  Patent Airway    __x__  Full return of protective reflexes    __x__  Regressing NAB; able to move bilateral lower extremities against gravity.     Vitals:   T:   98.5F        R:     12             BP:      125/73            Sat:        100           P: 96      Mental Status:  ___x_ Awake   __x___ Alert   _____ Drowsy   _____ Sedated    Nausea/Vomiting:  _x___ NO  ______Yes,   See Post - Op Orders          Pain Scale (0-10):  _5/10____    Treatment: ____ None    _x___ See Post - Op/PCA Orders    Post - Operative Fluids:   ____ Oral   __x__ See Post - Op Orders    Plan: Discharge:   ____Home       __x___Floor     _____Critical Care    _____  Other:_________________    Comments: pt tolerated procedure well, no anesthesia related complications. Care of pt endorsed to PACU, report given to PACU RN.

## 2020-03-26 NOTE — OB RN DELIVERY SUMMARY - NS_LABORCHARACTER_OBGYN_ALL_OB
Induction of labor-AROM/External electronic FM/Meconium staining/Chorioamnionitis/Induction of labor-Medicinal/Antibiotics in labor

## 2020-03-26 NOTE — PROGRESS NOTE ADULT - SUBJECTIVE AND OBJECTIVE BOX
PGY 1 Note:    Pt doing well, pain well controlled. No acute complaints. Denies fever, chills, N/V, CP, SOB, severe abdominal pain or heavy vaginal bleeding.    Ambulating: No  Lozano catheter in place  Flatus: No  Bowel movements: No  Breast or bottle feeding: Both  Diet: Regular    PAST MEDICAL & SURGICAL HISTORY:  Trichomonal infection  ADHD  Chlamydia  : 2018  History of surgery: TERMINATION X 3  ,,       Physical Exam  Vital Signs Last 24 Hrs  T(F): 98.7 (26 Mar 2020 14:25), Max: 100.9 (26 Mar 2020 05:30)  HR: 103 (26 Mar 2020 14:25) (72 - 151)  BP: 132/78 (26 Mar 2020 14:25) (98/57 - 151/90)  RR: 20 (26 Mar 2020 14:25) (18 - 20)    UO: 605cc (7802-2302) 172cc/hr (min 35cc/hr)    Gen: AAOx3, NAD  Heart: S1S2, RRR  Lungs: CTABL  Abd: Soft, appropriately tender, mildly distended, BS+  Incision: Dressing in place, no saturation.   Fundus: Firm, appropriately tender, below the umbilicus  Lochia: Minimal  Ext: No calf tenderness, no swelling, SCDs in place    Labs:                        12.9   4.35  )-----------( 197      ( 25 Mar 2020 10:24 )             39.6

## 2020-03-26 NOTE — PROGRESS NOTE ADULT - SUBJECTIVE AND OBJECTIVE BOX
Pt met case reviewed  arrest of dilation-no change x 4 hours with + contractions  risks: Anesthesia, infection, bleeding, transfusion, injury to other organs-bowel/bladder, dvt/pe, map and future pregnancy risks

## 2020-03-26 NOTE — PROGRESS NOTE ADULT - ASSESSMENT
A/P: 25yo now  s/p primary LTCS for arrest of dilation, POD#0, chorioamnionitis on amp/gent/clinda until 24h afebrile, last temp 100.9 @0500, COVID neg, recovering well  - continue routine postpartum care  - transfer to   - monitor vitals and bleeding  - regular diet  - standing simethicone  - encourage PO hydration, ambulation  - pain management PRN  - incentive spirometry bedside  - lovenox for DVT prophylaxis  - john until AM  - f/u AM CBC    Dr. Hurt and Dr. Price to be made aware.

## 2020-03-26 NOTE — CHART NOTE - NSCHARTNOTEFT_GEN_A_CORE
OBNICOLEN PGY3 Note:     Pt c/o painful ctx and vomiting.     Temp: 100.9F   SVE: 8/100/0, john bulb in front of head-> reduced     Plan:   - 650mg tylenol rectal x1 dose now   - topoff of epidural   - continuous EFM/toco    Dr. Su aware.

## 2020-03-26 NOTE — OB NEONATOLOGY/PEDIATRICIAN DELIVERY SUMMARY - NSPEDSNEONOTESA_OBGYN_ALL_OB_FT
Requested to attend FT unscheduled primary Cesction for arrest of dilation.  Mother with chorioamnionitis and is a PUI to R/O COVID19.  MSAF.   vigorous at time of birth. [place] with strong cry, displaying adequate color and tone.   Delayed cord clamping performed.  Brought to warmer, dried, and hat placed on head. Bulb suction to mouth and nose for retained amniotic fluid.  Chaseley well appearing.  No need for further intervention.  Pgar 9 and 9

## 2020-03-26 NOTE — PROGRESS NOTE ADULT - SUBJECTIVE AND OBJECTIVE BOX
Pt evaluated at bedside, c/o pressure.    T(C): 37.2 (20 @ 06:50), Max: 38.3 (20 @ 05:30)  HR: 108 (20 @ 09:32) (68 - 142)  BP: 105/70 (20 @ 09:32) (96/56 - 140/58)  RR: 18 (20 @ 06:50) (18 - 18)  SpO2: 97% (20 @ 09:27) (85% - 100%)    VE: 8/90/-1  Unchanged from previous exam  CTx: q 2-4 min  FHR: 155 moderate variabilty, +late decels, Cat II    Pitocin @ 4 milliunits    A/P: 24y.o.  @ 40.6wks IOL for post-dates with arrest of dilation, Chorioamnionitis, r/o Covid-19  - Decision made for primary  section due to arrest of dilation  - R/B/A of  discussed with pt  - Consent obtained  - On call to OR  Dr. Geoffrey haque

## 2020-03-26 NOTE — BRIEF OPERATIVE NOTE - NSICDXBRIEFPREOP_GEN_ALL_CORE_FT
PRE-OP DIAGNOSIS:  Arrest of dilation, delivered, current hospitalization 26-Mar-2020 11:01:24  Vicente Hale

## 2020-03-26 NOTE — PROGRESS NOTE ADULT - ASSESSMENT
A/P:   25 yo  at 40w6d, GBS neg, IOL for postdates, s/p epidural, s/p AROM, now with chorioamnionitis, with fever and sore throat, rule out COVID19, currently on pitocin    -continue pitocin  -epi for pain management   -amp/gent for chorioamnionitis  -f/u COVID swab, UDS  -cont efm/toco  -cont to monitor vitals  -cont iv hydration    Dr. Boggs and Dr. Su aware.

## 2020-03-26 NOTE — PROGRESS NOTE ADULT - SUBJECTIVE AND OBJECTIVE BOX
PGY1 Note    Patient seen at bedside for evaluation of labor progression, doing well, no complaints.     T(F): 98.24 (20 @ 03:20), Max: 98.78 (20 @ 15:16)  HR: 98 (20 @ 04:37) (68 - 134)  BP: 125/74 (20 @ 04:33) (96/56 - 140/58)  RR: 18 (20 @ 03:20) (16 - 18)  SpO2: 98% (20 @ 04:42) (85% - 100%)    EFM: 130/mod/+accels; cat 1  TOCO: q2 min  SVE: deferred, /0 vtx ruptured @0230    Medications:  epi placed @1230  pitocin restarted at 0145, currently @4mu/min  ampicillin/gentamicin @2210      Labs:                        12.9   4.35  )-----------( 197      ( 25 Mar 2020 10:24 )             39.6           ABO RH Interpretation: O POS (20 @ 10:24)    Antibody Screen: NEG (20 @ 10:24)    Urinalysis Basic - ( 25 Mar 2020 10:24 )    Color: Yellow / Appearance: Clear / S.024 / pH: x  Gluc: x / Ketone: Negative  / Bili: Negative / Urobili: <2 mg/dL   Blood: x / Protein: 30 mg/dL / Nitrite: Negative   Leuk Esterase: Negative / RBC: 10 /HPF / WBC 3 /HPF   Sq Epi: x / Non Sq Epi: 8 /HPF / Bacteria: Few      L&amp;D Drug Screen, Urine: Done (20 @ 10:24)

## 2020-03-27 LAB
BASOPHILS # BLD AUTO: 0.04 K/UL — SIGNIFICANT CHANGE UP (ref 0–0.2)
BASOPHILS NFR BLD AUTO: 0.3 % — SIGNIFICANT CHANGE UP (ref 0–1)
EOSINOPHIL # BLD AUTO: 0.03 K/UL — SIGNIFICANT CHANGE UP (ref 0–0.7)
EOSINOPHIL NFR BLD AUTO: 0.2 % — SIGNIFICANT CHANGE UP (ref 0–8)
HCT VFR BLD CALC: 31.8 % — LOW (ref 37–47)
HGB BLD-MCNC: 10.5 G/DL — LOW (ref 12–16)
IMM GRANULOCYTES NFR BLD AUTO: 0.4 % — HIGH (ref 0.1–0.3)
LYMPHOCYTES # BLD AUTO: 1.56 K/UL — SIGNIFICANT CHANGE UP (ref 1.2–3.4)
LYMPHOCYTES # BLD AUTO: 10.1 % — LOW (ref 20.5–51.1)
MCHC RBC-ENTMCNC: 29.2 PG — SIGNIFICANT CHANGE UP (ref 27–31)
MCHC RBC-ENTMCNC: 33 G/DL — SIGNIFICANT CHANGE UP (ref 32–37)
MCV RBC AUTO: 88.6 FL — SIGNIFICANT CHANGE UP (ref 81–99)
MONOCYTES # BLD AUTO: 1.68 K/UL — HIGH (ref 0.1–0.6)
MONOCYTES NFR BLD AUTO: 10.9 % — HIGH (ref 1.7–9.3)
NEUTROPHILS # BLD AUTO: 12.03 K/UL — HIGH (ref 1.4–6.5)
NEUTROPHILS NFR BLD AUTO: 78.1 % — HIGH (ref 42.2–75.2)
NRBC # BLD: 0 /100 WBCS — SIGNIFICANT CHANGE UP (ref 0–0)
PLATELET # BLD AUTO: 176 K/UL — SIGNIFICANT CHANGE UP (ref 130–400)
RBC # BLD: 3.59 M/UL — LOW (ref 4.2–5.4)
RBC # FLD: 13.3 % — SIGNIFICANT CHANGE UP (ref 11.5–14.5)
WBC # BLD: 15.4 K/UL — HIGH (ref 4.8–10.8)
WBC # FLD AUTO: 15.4 K/UL — HIGH (ref 4.8–10.8)

## 2020-03-27 PROCEDURE — 99231 SBSQ HOSP IP/OBS SF/LOW 25: CPT

## 2020-03-27 RX ORDER — IBUPROFEN 200 MG
600 TABLET ORAL EVERY 6 HOURS
Refills: 0 | Status: DISCONTINUED | OUTPATIENT
Start: 2020-03-27 | End: 2020-03-28

## 2020-03-27 RX ADMIN — Medication 204 MILLIGRAM(S): at 05:49

## 2020-03-27 RX ADMIN — Medication 975 MILLIGRAM(S): at 21:55

## 2020-03-27 RX ADMIN — Medication 975 MILLIGRAM(S): at 21:23

## 2020-03-27 RX ADMIN — Medication 216 GRAM(S): at 07:02

## 2020-03-27 RX ADMIN — Medication 1 TABLET(S): at 12:36

## 2020-03-27 RX ADMIN — Medication 975 MILLIGRAM(S): at 15:45

## 2020-03-27 RX ADMIN — Medication 600 MILLIGRAM(S): at 17:03

## 2020-03-27 RX ADMIN — Medication 100 MILLIGRAM(S): at 02:33

## 2020-03-27 RX ADMIN — Medication 600 MILLIGRAM(S): at 13:10

## 2020-03-27 RX ADMIN — Medication 30 MILLIGRAM(S): at 05:51

## 2020-03-27 RX ADMIN — Medication 975 MILLIGRAM(S): at 08:45

## 2020-03-27 RX ADMIN — Medication 975 MILLIGRAM(S): at 08:15

## 2020-03-27 RX ADMIN — Medication 600 MILLIGRAM(S): at 23:16

## 2020-03-27 RX ADMIN — Medication 30 MILLIGRAM(S): at 00:54

## 2020-03-27 RX ADMIN — Medication 30 MILLIGRAM(S): at 06:25

## 2020-03-27 RX ADMIN — Medication 600 MILLIGRAM(S): at 23:50

## 2020-03-27 RX ADMIN — Medication 100 MILLIGRAM(S): at 10:19

## 2020-03-27 RX ADMIN — Medication 600 MILLIGRAM(S): at 17:30

## 2020-03-27 RX ADMIN — Medication 30 MILLIGRAM(S): at 01:30

## 2020-03-27 RX ADMIN — Medication 600 MILLIGRAM(S): at 12:36

## 2020-03-27 RX ADMIN — ENOXAPARIN SODIUM 40 MILLIGRAM(S): 100 INJECTION SUBCUTANEOUS at 21:24

## 2020-03-27 RX ADMIN — Medication 216 GRAM(S): at 00:54

## 2020-03-27 RX ADMIN — Medication 975 MILLIGRAM(S): at 15:18

## 2020-03-27 NOTE — PROGRESS NOTE ADULT - ASSESSMENT
25 yo now , s/p primary LTCS for arrest of dilation, POD#1, chorioamnionitis on amp/gent/clinda, COVID neg, isolated elevated BP, all controlled postpartum, recovering well.    - john out, TOV 1230  - monitor vitals, bleeding  - regular diet, IV hydration  - standing simethicone  - c/w amp/gent/clinda until 24hrs afebrile  - encourage ambulation, incentive spirometry  - pain management PRN  - lovenox for DVT prophylaxis  - f/u AM CBC    Dr. Neal aware and Dr. Cheney to be made aware.

## 2020-03-27 NOTE — PROGRESS NOTE ADULT - SUBJECTIVE AND OBJECTIVE BOX
PGY 2 Post Partum note    Subjective: Pt seen and examined at bedside. Doing well, no events overnight, pain controlled. Pt has john in place, no flatus or BM, tolerating regular diet, OOB to chair, bottle feeding. Denies fever, chills, CP, SOB, N/V, RUQ/epigastric pain, LE pain.     Physical exam:    Vital Signs Last 24 Hrs  T(F): 96.9 (27 Mar 2020 03:30), Max: 98.96 (26 Mar 2020 06:50)  HR: 84 (27 Mar 2020 03:30) (84 - 151)  BP: 121/63 (27 Mar 2020 03:30) (101/76 - 151/90)  RR: 18 (27 Mar 2020 03:30) (18 - 20)  SpO2: 94% (26 Mar 2020 14:01) (93% - 100%)    Gen: NAD  CVS: s1s2, rrr  Lungs: ctab, no r/r/w  Abdomen: Soft, appropriately tender, no distension , firm uterine fundus below umbilicus, +BS, no RUQ/epigastric tenderness  Incision: tegaderm removed, c/d/i, steris  Pelvic: Normal lochia rubra noted  Ext: No calf tenderness, SCDs in place    Diet: Regular  meds:   acetaminophen   Tablet ..   975 milliGRAM(s) Oral (03-26-20 @ 21:41)    ampicillin  IVPB   216 mL/Hr IV Intermittent (03-27-20 @ 00:54)   216 mL/Hr IV Intermittent (03-26-20 @ 18:19)   216 mL/Hr IV Intermittent (03-26-20 @ 12:33)    clindamycin IVPB   100 mL/Hr IV Intermittent (03-26-20 @ 10:01)    clindamycin IVPB   100 mL/Hr IV Intermittent (03-27-20 @ 02:33)   100 mL/Hr IV Intermittent (03-26-20 @ 18:20)    enoxaparin Injectable   40 milliGRAM(s) SubCutaneous (03-26-20 @ 22:45)    famotidine Injectable   20 milliGRAM(s) IV Push (03-26-20 @ 09:40)    gentamicin   IVPB   204 mL/Hr IV Intermittent (03-27-20 @ 05:49)   204 mL/Hr IV Intermittent (03-26-20 @ 22:27)   204 mL/Hr IV Intermittent (03-26-20 @ 15:53)   204 mL/Hr IV Intermittent (03-26-20 @ 06:55)    ketorolac   Injectable   30 milliGRAM(s) IV Push (03-27-20 @ 05:51)   30 milliGRAM(s) IV Push (03-27-20 @ 00:54)   30 milliGRAM(s) IV Push (03-26-20 @ 18:20)    morphine  - Injectable   2 milliGRAM(s) IV Push (03-26-20 @ 12:57)        LABS:                        12.9   4.35  )-----------( 197      ( 25 Mar 2020 10:24 )             39.6

## 2020-03-28 ENCOUNTER — TRANSCRIPTION ENCOUNTER (OUTPATIENT)
Age: 25
End: 2020-03-28

## 2020-03-28 VITALS
RESPIRATION RATE: 18 BRPM | DIASTOLIC BLOOD PRESSURE: 85 MMHG | HEART RATE: 70 BPM | TEMPERATURE: 98 F | SYSTOLIC BLOOD PRESSURE: 130 MMHG

## 2020-03-28 LAB — RAPID RVP RESULT: SIGNIFICANT CHANGE UP

## 2020-03-28 PROCEDURE — 99238 HOSP IP/OBS DSCHRG MGMT 30/<: CPT

## 2020-03-28 RX ORDER — ACETAMINOPHEN 500 MG
3 TABLET ORAL
Qty: 36 | Refills: 0
Start: 2020-03-28 | End: 2020-03-30

## 2020-03-28 RX ORDER — IBUPROFEN 200 MG
1 TABLET ORAL
Qty: 28 | Refills: 0
Start: 2020-03-28 | End: 2020-04-03

## 2020-03-28 RX ORDER — DOCUSATE SODIUM 100 MG
1 CAPSULE ORAL
Qty: 10 | Refills: 0
Start: 2020-03-28 | End: 2020-04-01

## 2020-03-28 RX ORDER — SIMETHICONE 80 MG/1
1 TABLET, CHEWABLE ORAL
Qty: 30 | Refills: 0
Start: 2020-03-28 | End: 2020-04-01

## 2020-03-28 RX ADMIN — Medication 975 MILLIGRAM(S): at 08:01

## 2020-03-28 NOTE — DISCHARGE NOTE OB - CALL YOUR HEALTHCARE PROVIDER IF YOU ARE EXPERIENCING SYMPTOMS OF DEPRESSION THAT LAST MORE THAN THREE DAYS
Neurosurgery Progress Note    Subjective:  Doing well, ambulated, passing gas  Improved numbness and pain, subjective improvement in strength in RLE   Pain well controlled on oral medications  Denies new pain, numbness, weakness.   Denies HA, positional HA, N/V, dizziness, chest pain, SOB, difficulty breathing, chills    Exam:  VSS  A&Ox4, NAD  NM: 5/5 hip flexion, knee extension, knee flexion, plantar flexion, dorsiflexion, EHL except 4- TA, 4/5 EHL, hamstring, quad   Sensation intact and equal throughout all four extremities decreased in lateral calf  Negative SLR, CSLR   Abdomen: soft, non-tender  Non-labored breathing on room air, normal respiratory effort  Capillary refill in all four extremities <2 seconds  No LE edema, erythema, cyanosis, clubbing  Calves non-tender to compression bilat  Incision CDI, no halo sign      BP  Min: 100/54  Max: 119/73  Pulse  Av.2  Min: 74  Max: 119  Resp  Avg: 15.1  Min: 12  Max: 19  Temp  Av.1 °C (98.8 °F)  Min: 36.7 °C (98.1 °F)  Max: 37.7 °C (99.9 °F)  SpO2  Av.9 %  Min: 96 %  Max: 100 %    No Data Recorded    Recent Labs      10/17/18   0750  10/18/18   0222   WBC  12.2*  21.4*   RBC  4.47  4.12*   HEMOGLOBIN  14.5  13.3   HEMATOCRIT  43.0  40.3   MCV  96.2  97.8   MCH  32.4  32.3   MCHC  33.7  33.0*   RDW  47.9  48.6   PLATELETCT  363  341   MPV  8.5*  8.9*     Recent Labs      10/17/18   0750  10/18/18   0222   SODIUM  139  139   POTASSIUM  4.3  4.0   CHLORIDE  108  107   CO2  24  23   GLUCOSE  131*  131*   BUN  24*  15   CREATININE  0.58  0.63   CALCIUM  8.9  8.2*     Recent Labs      10/17/18   075   APTT  23.0*   INR  0.93           Intake/Output       10/17/18 0700 - 10/18/18 0659 10/18/18 0700 - 10/19/18 0659      1900-0659 Total 9018-6100 2796-3015 Total       Intake    P.O.  --  -- --  240  -- 240    P.O. -- -- -- 240 -- 240    I.V.  700  -- 700  --  -- --    Crystalloid Intake 700 -- 700 -- -- --    Total Intake 700 -- 700 240 -- 240        Output    Urine  --  -- --  --  -- --    Number of Times Voided -- 2 x 2 x 1 x -- 1 x    Stool  --  -- --  --  -- --    Number of Times Stooled -- 1 x 1 x -- -- --    Blood  10  -- 10  --  -- --    Est. Blood Loss (mL) 10 -- 10 -- -- --    Total Output 10 -- 10 -- -- --       Net I/O     690 -- 690 240 -- 240            Intake/Output Summary (Last 24 hours) at 10/18/18 0858  Last data filed at 10/18/18 0800   Gross per 24 hour   Intake              940 ml   Output               10 ml   Net              930 ml            • senna-docusate  2 Tab BID    And   • polyethylene glycol/lytes  1 Packet QDAY PRN    And   • magnesium hydroxide  30 mL QDAY PRN    And   • bisacodyl  10 mg QDAY PRN   • NS   Continuous   • ondansetron  4 mg Q4HRS PRN   • ondansetron  4 mg Q4HRS PRN   • promethazine  12.5-25 mg Q4HRS PRN   • promethazine  12.5-25 mg Q4HRS PRN   • prochlorperazine  5-10 mg Q4HRS PRN   • acetaminophen  650 mg Q6HRS PRN   • morphine injection  2 mg Q4HRS PRN   • oxyCODONE immediate-release  5 mg Q4HRS PRN   • dexamethasone  4 mg Q6HRS       Assessment and Plan:  POD #1 L4-L5 R Hemilaminectomy, microdiscectomy   Prophylactic anticoagulation: yes         Start date/time: 24 hours s/p surgery  Ambulate, work with therapies  Continue incentive spirometry Q1H  Continue pain control  Continue stool softeners to encourage BM    OK to d/c to home today pending PT/OT eval     DC instructions d/w pt  F/u in clinic in two weeks    INCISION CARE:  OK to shower with incision covered or uncovered. After shower, pat incision dry and cover with gauze and tape if draining. OK to leave open to air if not draining.   Steri-strips, if applicable can be removed as they fall off on their own naturally.     RESTRICTIONS:  Continue to wear your brace as directed   No lifting greater than 10 pounds, no repetitive bending or twisting  No driving for two weeks, no driving while on narcotic medication or muscle relaxers  No aspirin, blood  thinners, NSAIDS (non-steroidal anti-inflammatory medications - aleve, motrin, ibuprofen, celebrex) for two weeks     RECOMMENDATIONS:  Over the counter stool softeners daily while on narcotics  Ambulate often to prevent blood clots in your legs  Continue incentive spirometer hourly   Follow up at [facility] in 2 weeks after surgery.    Please call [phone] to confirm the date, location, and time of your follow up appointment that was made for you.     Patient agrees with treatment plan.   Case discussed with Dr. Cid.             Statement Selected

## 2020-03-28 NOTE — PROGRESS NOTE ADULT - ASSESSMENT
A/P: 23 yo now  POD#2 s/p primary LTCS for arrest of dilation, chorioamnionitis s/p amp/gent/clinda, COVID neg, isolated elevated BP, all controlled postpartum, recovering well.  - encourage ambulation   - encourage PO hydration   - monitor vitals, bleeding  - diet: regular   - standing simethicone  - encourage incentive spirometry  - pain management PRN  - lovenox for DVT prophylaxis  - vaginal and incisional precautions discussed  - routine postpartum precautions discussed  - anticipate discharge home and follow-up at the [facility] in one week for an incision check     Dr. Chacon and OB/GYN service attending to be made aware

## 2020-03-28 NOTE — DISCHARGE NOTE OB - MEDICATION SUMMARY - MEDICATIONS TO TAKE
I will START or STAY ON the medications listed below when I get home from the hospital:    ibuprofen 600 mg oral tablet  -- 1 tab(s) by mouth every 6 hours  -- Indication: For pain    acetaminophen 325 mg oral capsule  -- 3 cap(s) by mouth every 6 hours   -- Indication: For pain    oxycodone-acetaminophen 5 mg-325 mg oral tablet  -- 1 tab(s) by mouth every 6 hours MDD:MDD: 4  -- Caution federal law prohibits the transfer of this drug to any person other  than the person for whom it was prescribed.  May cause drowsiness.  Alcohol may intensify this effect.  Use care when operating dangerous machinery.  This prescription cannot be refilled.  This product contains acetaminophen.  Do not use  with any other product containing acetaminophen to prevent possible liver damage.  Using more of this medication than prescribed may cause serious breathing problems.    -- Indication: For pain    Prenatal Multivitamins with Folic Acid 1 mg oral tablet  -- 1 tab(s) by mouth once a day  -- Indication: For postpartum    Colace 100 mg oral capsule  -- 1 cap(s) by mouth 2 times a day   -- Medication should be taken with plenty of water.    -- Indication: For constipation    simethicone 80 mg oral tablet, chewable  -- 1 tab(s) by mouth every 4 hours, As needed, Gas  -- Indication: For gas

## 2020-03-28 NOTE — DISCHARGE NOTE OB - USE THE FOOD PYRAMID (LOCATED IN DISCHARGE MATERIALS PROVIDED) AS A GUIDE TO BALANCE AND MODERATION
SUBJECTIVE:   Ade Wilkins is a 66 year old female who presents for Preventive Visit.    Are you in the first 12 months of your Medicare Part B coverage?  Yes, January 1.  Visual Acuity:  Right Eye: 20/20   Left Eye: 20/20  Both Eyes: 20/20 (with glasses)      Healthy Habits:    Do you get at least three servings of calcium containing foods daily (dairy, green leafy vegetables, etc.)? yes    Amount of exercise or daily activities, outside of work: none    Problems taking medications regularly No    Medication side effects: No    Have you had an eye exam in the past two years? yes    Do you see a dentist twice per year? Yes     Do you have sleep apnea, excessive snoring or daytime drowsiness?no      Ability to successfully perform activities of daily living: Yes, no assistance needed    Home safety:  none identified     Hearing impairment: Yes, Difficulty following a conversation in a noisy restaurant or crowded room.    Fall risk:  Fallen 2 or more times in the past year?: No  Any fall with injury in the past year?: No      COGNITIVE SCREEN  1) Repeat 3 items (Banana, Sunrise, Chair)    2) Clock draw: NORMAL  3) 3 item recall: Recalls 3 objects  Results: 3 items recalled: COGNITIVE IMPAIRMENT LESS LIKELY    Mini-CogTM Copyright S Erin. Licensed by the author for use in [facility]; reprinted with permission (deisi@.[email or web address]). All rights reserved.        Additional issues to address:  1.  Seeing Dr. Ryan yearly for lympoma, watchful management.   Will have new MD when he retires.    2.  Seeing Dr. Naik for DM, reports recent A1c still 8.1 though not in Epic.  Just retired, and has gained 10 lbs.  Not exercising much due to knee problem.    3.  Follow-up HTN.  Patient is checking outpatient blood pressures, at home.  Results are high, up to 160.  She reports no side effects from BP medications, but having tinnitus late afternoon when she feels is blood pressure.  4.  Small rash under R breast past  couple months    Reviewed and updated as needed this visit by clinical staffTobacco  Allergies         Reviewed and updated as needed this visit by Provider        Social History   Substance Use Topics     Smoking status: Never Smoker     Smokeless tobacco: Never Used     Alcohol use No       If you drink alcohol do you typically have >3 drinks per day or >7 drinks per week? Not Applicable                        Today's PHQ-2 Score:   PHQ-2 ( 1999 [employer]) 8/25/2017 3/28/2017   Q1: Little interest or pleasure in doing things 0 0   Q2: Feeling down, depressed or hopeless 0 0   PHQ-2 Score 0 0   Q1: Little interest or pleasure in doing things - -   Q2: Feeling down, depressed or hopeless - -   PHQ-2 Score - -         Do you feel safe in your environment - Yes    Do you have a Health Care Directive?: No: Advance care planning reviewed with patient; information given to patient to review.      Current providers sharing in care for this patient include: Patient Care Team:  Dimitry Howard MD as PCP - General  Melvi Naik MD as MD (INTERNAL MEDICINE - ENDOCRINOLOGY, DIABETES & METABOLISM)    The following health maintenance items are reviewed in Epic and correct as of today:  Health Maintenance   Topic Date Due     ADVANCE DIRECTIVE PLANNING Q5 YRS  02/08/2017     FOOT EXAM Q1 YEAR  11/04/2017     A1C Q3 MO  11/30/2017     BMP Q6 MOS  02/25/2018     TSH Q1 YEAR  03/28/2018     FALL RISK ASSESSMENT  03/28/2018     LIPID MONITORING Q1 YEAR  03/28/2018     EYE EXAM Q1 YEAR  05/19/2018     MAMMO SCREEN Q2 YR (SYSTEM ASSIGNED)  06/17/2018     ALT Q1 YR  08/25/2018     HEMOGLOBIN Q1 YR  08/25/2018     MICROALBUMIN Q1 YEAR  08/25/2018     DEXA Q5 YR  12/10/2018     TSH W/ FREE T4 REFLEX Q2 YEAR  03/28/2019     COLONOSCOPY Q10 YR  04/11/2019     TETANUS IMMUNIZATION (SYSTEM ASSIGNED)  10/09/2025     PNEUMO 5YR BOOST DUE AFTER AGE 65 (NO IB MSG)  Completed     INFLUENZA VACCINE (SYSTEM ASSIGNED)  Completed  "    PNEUMOCOCCAL  Completed     HEPATITIS C SCREENING  Completed       ROS:  Constitutional, HEENT, cardiovascular, pulmonary, gi and gu systems are negative, except as otherwise noted.      OBJECTIVE:   /70  Pulse 80  Temp 97.2  F (36.2  C) (Oral)  Ht 5' 2.5\" (1.588 m)  Wt 235 lb 9.6 oz (106.9 kg)  SpO2 95%  BMI 42.41 kg/m2 Estimated body mass index is 42.15 kg/(m^2) (pended) as calculated from the following:    Height as of 12/6/17: (P) 5' 2.5\" (1.588 m).    Weight as of 12/6/17: (P) 234 lb 3.2 oz (106.2 kg).  EXAM:   Recheck 142/60  Regular heart tones  Neck and thyroid unremarkable  Trace to 1+ ankle edema  Skin shows typical fungal changes of the breast with some erythema    ASSESSMENT / PLAN:     ASSESSMENT:   1.  Annual Wellness Visit  2.  HTN, weight and blood pressure up.    Patient chooses to try lifestyle changes before adding more medication (consider clonidine)  3.  Morbid obesity, worse  4.  DM, followed elsewhere    5.  CKD, follow-up next month planned  6.  Fungal dermatitis under R breast >> L      PLAN:  1.  Recheck fasting labs in a month, then follow-up   2.  Trial of over the counter Butenafine or Miconazole twice daily for rash under breast  3.  Continue to work on weight, increase activity, and monitor blood pressure       End of Life Planning:  Patient currently has an advanced directive: No.  I have verified the patient's ablity to prepare an advanced directive/make health care decisions.  Literature was provided to assist patient in preparing an advanced directive.    COUNSELING:  Reviewed preventive health counseling, as reflected in patient instructions      Estimated body mass index is 42.15 kg/(m^2) (pended) as calculated from the following:    Height as of 12/6/17: (P) 5' 2.5\" (1.588 m).    Weight as of 12/6/17: (P) 234 lb 3.2 oz (106.2 kg).  Weight management plan: Discussed healthy diet and exercise guidelines and patient will follow up in 2 mo in clinic to " re-evaluate.     reports that she has never smoked. She has never used smokeless tobacco.        Appropriate preventive services were discussed with this patient, including applicable screening as appropriate for cardiovascular disease, diabetes, osteopenia/osteoporosis, and glaucoma.  As appropriate for age/gender, discussed screening for colorectal cancer, prostate cancer, breast cancer, and cervical cancer. Checklist reviewing preventive services available has been given to the patient.    Reviewed patients plan of care and provided an AVS. The Basic Care Plan (routine screening as documented in Health Maintenance) for Ade meets the Care Plan requirement. This Care Plan has been established and reviewed with the Patient.    Counseling Resources:  ATP IV Guidelines  Pooled Cohorts Equation Calculator  Breast Cancer Risk Calculator  FRAX Risk Assessment  ICSI Preventive Guidelines  Dietary Guidelines for Americans, 2010  USDA's MyPlate  ASA Prophylaxis  Lung CA Screening    Dimitry oHward MD  [facility]   Statement Selected

## 2020-03-28 NOTE — DISCHARGE NOTE OB - MEDICATION SUMMARY - MEDICATIONS TO STOP TAKING
I will STOP taking the medications listed below when I get home from the hospital:    acetaminophen 325 mg oral tablet  -- 2 tab(s) by mouth every 6 hours, As needed, Mild Pain (1 - 3)    amoxicillin 500 mg oral tablet  -- 500 tab(s) by mouth 3 times a day   -- Finish all this medication unless otherwise directed by prescriber.    Cleocin HCl 300 mg oral capsule  -- 1 cap(s) by mouth every 8 hours   -- Finish all this medication unless otherwise directed by prescriber.  Medication should be taken with plenty of water.    metroNIDAZOLE 500 mg oral tablet  -- 4 tab(s) by mouth once a day   -- Do not drink alcoholic beverages when taking this medication.  Finish all this medication unless otherwise directed by prescriber.  May discolor urine or feces.    pyridoxine 25 mg oral tablet  -- 1 tab(s) by mouth 3 times a day    Prenatal Multivitamins with Folic Acid 0.2 mg oral tablet  -- 1 tab(s) by mouth once a day    Zofran ODT 4 mg oral tablet, disintegrating  -- 1 tab(s) by mouth 3 times a day    metroNIDAZOLE 500 mg oral tablet  -- 1 tab(s) by mouth 2 times a day   -- Do not drink alcoholic beverages when taking this medication.  Finish all this medication unless otherwise directed by prescriber.  May discolor urine or feces.    terconazole 0.8% vaginal cream  -- 1 applicatorful intravaginally once a day   -- For vaginal use.

## 2020-03-28 NOTE — PROGRESS NOTE ADULT - SUBJECTIVE AND OBJECTIVE BOX
WALKER COHEN  24y  Female    PGY1 Note:    POD#2  Pt is recovering well, no acute complaints. Pt denies headache, chest pain, SOB, fever, chills, nausea, vomiting, extremity pain or swelling. Pt desires to be discharged home today     Ambulating: Yes  Voiding: Yes  Flatus: Yes  Bowel movements: No    Breast or bottle feeding: Both   Diet: Regular    MEDICATIONS  (STANDING):  acetaminophen   Tablet .. 975 milliGRAM(s) Oral <User Schedule>  acetaminophen  IVPB .. 1000 milliGRAM(s) IV Intermittent once  enoxaparin Injectable 40 milliGRAM(s) SubCutaneous at bedtime  ibuprofen  Tablet. 600 milliGRAM(s) Oral every 6 hours  lactated ringers. 1000 milliLiter(s) (125 mL/Hr) IV Continuous <Continuous>  oxytocin Infusion 333.333 milliUNIT(s)/Min (1000 mL/Hr) IV Continuous <Continuous>  prenatal multivitamin 1 Tablet(s) Oral daily    MEDICATIONS  (PRN):  diphenhydrAMINE 25 milliGRAM(s) Oral every 6 hours PRN Itching  glycerin Suppository - Adult 1 Suppository(s) Rectal at bedtime PRN Constipation  HYDROmorphone  Injectable 1 milliGRAM(s) IV Push every 10 minutes PRN Severe Pain (7 - 10)  lanolin Ointment 1 Application(s) Topical every 6 hours PRN Sore Nipples  magnesium hydroxide Suspension 30 milliLiter(s) Oral two times a day PRN Constipation  morphine  - Injectable 2 milliGRAM(s) IV Push every 4 hours PRN Severe Pain (7 - 10)  naloxone Injectable 0.1 milliGRAM(s) IV Push every 3 minutes PRN For ANY of the following changes in patient status:  A. RR LESS THAN 10 breaths per minute, B. Oxygen saturation LESS THAN 90%, C. Sedation score of 6  ondansetron Injectable 4 milliGRAM(s) IV Push every 6 hours PRN Nausea  ondansetron Injectable 4 milliGRAM(s) IV Push once PRN Nausea and/or Vomiting  oxyCODONE    IR 5 milliGRAM(s) Oral every 3 hours PRN Moderate to Severe Pain (4-10)  oxyCODONE    IR 5 milliGRAM(s) Oral once PRN Moderate to Severe Pain (4-10)  simethicone 80 milliGRAM(s) Chew every 4 hours PRN Gas    Physical Exam  Vital Signs Last 24 Hrs  T(C): 36.9 (28 Mar 2020 04:18), Max: 36.9 (28 Mar 2020 04:18)  T(F): 98.4 (28 Mar 2020 04:18), Max: 98.4 (28 Mar 2020 04:18)  HR: 75 (28 Mar 2020 04:18) (71 - 89)  BP: 118/80 (28 Mar 2020 04:18) (113/81 - 124/77)  RR: 18 (28 Mar 2020 04:18) (18 - 18)    Gen: AAOx3, NAD  Fundus: firm, below umbilicus   Wound: steris in place, incision c/d/i   Abd: Soft, nontender, nondistended, BS+  Lochia: minimal rubra   Ext: No calf tenderness, no swelling    Labs:                        10.5   15.40 )-----------( 176      ( 27 Mar 2020 05:32 )             31.8                         12.9   4.35  )-----------( 197      ( 25 Mar 2020 10:24 )             39.6

## 2020-03-28 NOTE — DISCHARGE NOTE OB - PATIENT PORTAL LINK FT
You can access the FollowMyHealth Patient Portal offered by [facility] by registering at the following website: [email or web address]. By joining [facility]’s FollowMyHealth portal, you will also be able to view your health information using other applications (apps) compatible with our system.

## 2020-03-28 NOTE — DISCHARGE NOTE OB - ADDITIONAL INSTRUCTIONS
If you expirence any of the following, please notify your provider:  -fever >100.4F  -increased vaginal bleeding or clotting (saturating a pad an hour)  -foul smelling discharge or bloody discharge from your incision site  -severe abdominal, vaginal, or rectal pain   -persistent headache or vision changes  -swollen areas on your legs that are red, hot, or painful   -swollen, hot, painful areas and/or streaks on your breasts  -cracked or bleeding nipples  -mood swings, depression, or crying spells lasting more than 3 days

## 2020-03-28 NOTE — DISCHARGE NOTE OB - CARE PROVIDER_API CALL
Mario Alberto Govea)  Obstetrics and Gynecology  [address] [place] [address]  Phone: [phone]  Fax: [phone]  Follow Up Time:

## 2020-03-30 ENCOUNTER — APPOINTMENT (OUTPATIENT)
Dept: OBGYN | Facility: CLINIC | Age: 25
End: 2020-03-30

## 2020-03-31 DIAGNOSIS — O48.0 POST-TERM PREGNANCY: ICD-10-CM

## 2020-03-31 DIAGNOSIS — Z3A.40 40 WEEKS GESTATION OF PREGNANCY: ICD-10-CM

## 2020-03-31 DIAGNOSIS — Z34.80 ENCOUNTER FOR SUPERVISION OF OTHER NORMAL PREGNANCY, UNSPECIFIED TRIMESTER: ICD-10-CM

## 2020-03-31 LAB — SURGICAL PATHOLOGY STUDY: SIGNIFICANT CHANGE UP

## 2020-04-13 ENCOUNTER — APPOINTMENT (OUTPATIENT)
Dept: OBGYN | Facility: CLINIC | Age: 25
End: 2020-04-13
Payer: MEDICAID

## 2020-04-27 ENCOUNTER — OUTPATIENT (OUTPATIENT)
Dept: OUTPATIENT SERVICES | Facility: HOSPITAL | Age: 25
LOS: 1 days | Discharge: HOME | End: 2020-04-27

## 2020-04-27 ENCOUNTER — APPOINTMENT (OUTPATIENT)
Dept: OBGYN | Facility: CLINIC | Age: 25
End: 2020-04-27
Payer: MEDICAID

## 2020-04-27 ENCOUNTER — RESULT CHARGE (OUTPATIENT)
Age: 25
End: 2020-04-27

## 2020-04-27 VITALS
SYSTOLIC BLOOD PRESSURE: 112 MMHG | HEIGHT: 63 IN | BODY MASS INDEX: 22.35 KG/M2 | WEIGHT: 126.13 LBS | DIASTOLIC BLOOD PRESSURE: 72 MMHG

## 2020-04-27 DIAGNOSIS — O03.9 COMPLETE OR UNSPECIFIED SPONTANEOUS ABORTION WITHOUT COMPLICATION: Chronic | ICD-10-CM

## 2020-04-27 DIAGNOSIS — Z98.890 OTHER SPECIFIED POSTPROCEDURAL STATES: Chronic | ICD-10-CM

## 2020-04-27 LAB — HCG UR QL: NEGATIVE

## 2020-04-27 RX ORDER — NORETHINDRONE ACETATE AND ETHINYL ESTRADIOL AND FERROUS FUMARATE 1MG-20(21)
1-20 KIT ORAL DAILY
Qty: 1 | Refills: 6 | Status: ACTIVE | OUTPATIENT
Start: 2020-04-27

## 2020-04-27 NOTE — HISTORY OF PRESENT ILLNESS
[Delivery Date: ___] : on [unfilled] [Primary C/S] : delivered by  section [Postpartum Follow Up] : postpartum follow up [Female] : Delivery History: baby girl [Wt. ___] : weighing [unfilled] [Intended Contraception] : Intended Contraception: [Oral Contraceptives] : oral contraceptives [Healed] : healed [Clean/Dry/Intact] : clean, dry and intact [Back to Normal] : is back to normal in size [None] : no vaginal bleeding [Normal] : the vagina was normal [Examination Of The Breasts] : breasts are normal [Doing Well] : is doing well [No Sign of Infection] : is showing no signs of infection [Breastfeeding] : not currently nursing [Resumed Menses] : has not resumed her menses [Resumed [place]] : has not resumed intercourse [Cervix Sample Taken] : cervical sample not taken for a Pap smear

## 2020-04-27 NOTE — ATTENDING NOTE
[Staffed While Pt in Clinic, Pt Seen/Examined by Me] : Staffed while patient in clinic, patient seen and examined by me [] : I agree with the Nurse Practitioner management as it was presented to me

## 2020-05-08 ENCOUNTER — APPOINTMENT (OUTPATIENT)
Dept: OBGYN | Facility: CLINIC | Age: 25
End: 2020-05-08

## 2020-05-18 ENCOUNTER — APPOINTMENT (OUTPATIENT)
Dept: OBGYN | Facility: CLINIC | Age: 25
End: 2020-05-18

## 2020-05-20 ENCOUNTER — OUTPATIENT (OUTPATIENT)
Dept: OUTPATIENT SERVICES | Facility: HOSPITAL | Age: 25
LOS: 1 days | Discharge: HOME | End: 2020-05-20

## 2020-05-20 ENCOUNTER — APPOINTMENT (OUTPATIENT)
Dept: OBGYN | Facility: CLINIC | Age: 25
End: 2020-05-20
Payer: MEDICAID

## 2020-05-20 VITALS — BODY MASS INDEX: 23.91 KG/M2 | SYSTOLIC BLOOD PRESSURE: 100 MMHG | DIASTOLIC BLOOD PRESSURE: 60 MMHG | WEIGHT: 135 LBS

## 2020-05-20 DIAGNOSIS — O03.9 COMPLETE OR UNSPECIFIED SPONTANEOUS ABORTION WITHOUT COMPLICATION: Chronic | ICD-10-CM

## 2020-05-20 DIAGNOSIS — Z98.890 OTHER SPECIFIED POSTPROCEDURAL STATES: Chronic | ICD-10-CM

## 2020-05-20 PROCEDURE — 99212 OFFICE O/P EST SF 10 MIN: CPT

## 2020-07-01 ENCOUNTER — LABORATORY RESULT (OUTPATIENT)
Age: 25
End: 2020-07-01

## 2020-07-01 ENCOUNTER — APPOINTMENT (OUTPATIENT)
Dept: OBGYN | Facility: CLINIC | Age: 25
End: 2020-07-01
Payer: MEDICAID

## 2020-07-01 ENCOUNTER — OUTPATIENT (OUTPATIENT)
Dept: OUTPATIENT SERVICES | Facility: HOSPITAL | Age: 25
LOS: 1 days | Discharge: HOME | End: 2020-07-01

## 2020-07-01 VITALS
HEIGHT: 63 IN | DIASTOLIC BLOOD PRESSURE: 70 MMHG | SYSTOLIC BLOOD PRESSURE: 100 MMHG | WEIGHT: 128.5 LBS | BODY MASS INDEX: 22.77 KG/M2

## 2020-07-01 DIAGNOSIS — Z98.890 OTHER SPECIFIED POSTPROCEDURAL STATES: Chronic | ICD-10-CM

## 2020-07-01 DIAGNOSIS — R07.89 OTHER CHEST PAIN: ICD-10-CM

## 2020-07-01 DIAGNOSIS — O03.9 COMPLETE OR UNSPECIFIED SPONTANEOUS ABORTION WITHOUT COMPLICATION: Chronic | ICD-10-CM

## 2020-07-01 DIAGNOSIS — Z51.89 ENCOUNTER FOR OTHER SPECIFIED AFTERCARE: ICD-10-CM

## 2020-07-01 DIAGNOSIS — N76.0 ACUTE VAGINITIS: ICD-10-CM

## 2020-07-01 PROCEDURE — 99395 PREV VISIT EST AGE 18-39: CPT

## 2020-07-01 RX ORDER — NORETHINDRONE ACETATE AND ETHINYL ESTRADIOL 1MG-20(21)
1-20 KIT ORAL DAILY
Qty: 28 | Refills: 12 | Status: ACTIVE | COMMUNITY
Start: 2020-07-01 | End: 1900-01-01

## 2020-07-01 NOTE — HISTORY OF PRESENT ILLNESS
[1 Year Ago] : 1 year ago [Last Pap ___] : Last cervical pap smear was [unfilled] [Reproductive Age] : is of reproductive age [Sexually Active] : is sexually active [de-identified] : wants to start ocp

## 2020-07-02 LAB
HBV SURFACE AG SER QL: NONREACTIVE
T PALLIDUM AB SER QL IA: NEGATIVE

## 2020-07-07 LAB
HCV RNA SERPL NAA DL=5-ACNC: NOT DETECTED IU/ML
HCV RNA SERPL NAA+PROBE-LOG IU: NOT DETECTED LOG10IU/ML

## 2020-07-15 LAB
A VAGINAE DNA VAG QL NAA+PROBE: ABNORMAL
BVAB2 DNA VAG QL NAA+PROBE: ABNORMAL
C KRUSEI DNA VAG QL NAA+PROBE: NEGATIVE
C TRACH RRNA SPEC QL NAA+PROBE: NEGATIVE
MEGA1 DNA VAG QL NAA+PROBE: ABNORMAL
N GONORRHOEA RRNA SPEC QL NAA+PROBE: NEGATIVE
T VAGINALIS RRNA SPEC QL NAA+PROBE: NEGATIVE

## 2020-08-01 PROCEDURE — G9001: CPT

## 2020-08-01 PROCEDURE — G9005: CPT

## 2020-08-17 ENCOUNTER — APPOINTMENT (OUTPATIENT)
Dept: OBGYN | Facility: CLINIC | Age: 25
End: 2020-08-17

## 2020-09-28 ENCOUNTER — APPOINTMENT (OUTPATIENT)
Dept: INTERNAL MEDICINE | Facility: CLINIC | Age: 25
End: 2020-09-28

## 2020-10-23 ENCOUNTER — APPOINTMENT (OUTPATIENT)
Dept: INTERNAL MEDICINE | Facility: CLINIC | Age: 25
End: 2020-10-23

## 2020-10-27 NOTE — PROCEDURAL SAFETY CHECKLIST WITH OR WITHOUT SEDATION - NSPROCEDPERFORMDFREE_GEN_ALL_CORE
"  Chief Complaint: Contraception Counseling     HPI:     Makayla is a 22 y.o.  who presents today to discuss contraception. She notes that she has previously tried RASHMI and POP but has discontinued after 2-3 weeks due to depression symptoms.  Patient reports menses are regular. She denies heavy, painful periods. She uses pads.  She is currently sexually active with a single male partner. She is currently using no method for contraception. She is without other complaints at this time. She declined STD screening today.     Gynecology Menarche 11.  Cycles occur every 24 days with menses lasting 7 days.      Gardasil completed    OB History    Para Term  AB Living   0 0 0 0 0 0   SAB TAB Ectopic Multiple Live Births   0 0 0 0 0     Past Medical History:   Diagnosis Date    Kidney infection 10/01/2018     Family History   Problem Relation Age of Onset    No Known Problems Mother     No Known Problems Father     Hypertension Paternal Grandfather     Diabetes Paternal Grandfather     Breast cancer Maternal Aunt     Breast cancer Paternal Cousin     Diabetes Paternal Uncle     Colon cancer Neg Hx     Ovarian cancer Neg Hx      Social History     Tobacco Use    Smoking status: Never Smoker    Smokeless tobacco: Never Used   Substance Use Topics    Alcohol use: Yes     Comment: occ    Drug use: No       ROS:     GENERAL: Denies fevers or chills. Feeling well overall.   HEENT: denies h/o migraine.  URINARY: Denies frequency, dysuria, hematuria.  GYNECOLOGIC: denies heavy menses. denies dysmenorrhea.  DERMATOLOGIC: denies acne.     Physical Exam:      PHYSICAL EXAM:  Vitals:    10/27/20 1600   BP: 118/70   Weight: 53.5 kg (118 lb)   Height: 5' 1" (1.549 m)       APPEARANCE: Well nourished, well developed, in no acute distress.  PSYCH: Appropriate mood and affect.  EXTREMITIES: No edema.     Assessment/Plan:     Makayla was seen today for contraception.    Diagnoses and all orders for this " visit:    Encounter for contraceptive management, unspecified type  -     Device Authorization Order          Counseling:     The risks of, benefits of, and alternatives of various forms of contraception were discussed at this visit. After a discussion of the R/B/A of fertility awareness, barrier contraception, hormonal pills, injections, patches, rings, hormonal and non-hormonal IUDs, and the subdermal implant, all of  questions were answered, and she has opted for Paragard IUD.    Condoms for STD protection were discussed, as was Plan B in the event of unprotected intercourse.   Recommendations for STD screening based on Makayla's age and sexual habits were reviewed. Patient declines STI screening today and GC/CT unavailable due to nationwide shortage.     30 minutes of face to face counseling occurred during today's visit.           Zabrina Bernal MD   Epidural

## 2020-11-03 NOTE — ED PROVIDER NOTE - PHYSICAL EXAMINATION
CONSTITUTIONAL: well developed, nontoxic appearing, in no acute distress, speaking in full sentences  SKIN: warm, dry, no rash, cap refill < 2 seconds  HEENT: normocephalic, atraumatic, no conjunctival erythema, moist mucous membranes, patent airway  NECK: supple, no masses  CV:  regular rate, regular rhythm, 2+ radial pulses bilaterally  RESP: no wheezes, no rales, no rhonchi, normal work of breathing  ABD: soft, LLQ tenderness to palpation, epigastric tenderness, nondistended, no rebound, no guarding  BACK: L CVA tenderness  MSK: normal ROM, no cyanosis, no edema  NEURO: alert, oriented, grossly unremarkable  PSYCH: cooperative, appropriate CONSTITUTIONAL: well developed, nontoxic appearing, in no acute distress, speaking in full sentences  SKIN: warm, dry, no rash, cap refill < 2 seconds  HEENT: normocephalic, atraumatic, no conjunctival erythema, moist mucous membranes, patent airway  NECK: supple, no masses  CV:  regular rate, regular rhythm, 2+ radial pulses bilaterally  RESP: no wheezes, no rales, no rhonchi, normal work of breathing  ABD: soft, LLQ tenderness to palpation, epigastric tenderness, nondistended, no rebound, no guarding  : normal external genitalia without lesions or rash, thin yellow-green discharge in vaginal vault, os closed, no CMT, no fundal/adnexal tenderness, no palpable mass, female chaperone Ann med student present  BACK: L CVA tenderness  MSK: normal ROM, no cyanosis, no edema  NEURO: alert, oriented, grossly unremarkable  PSYCH: cooperative, appropriate Closure 2 Information: This tab is for additional flaps and grafts, including complex repair and grafts and complex repair and flaps. You can also specify a different location for the additional defect, if the location is the same you do not need to select a new one. We will insert the automated text for the repair you select below just as we do for solitary flaps and grafts. Please note that at this time if you select a location with a different insurance zone you will need to override the ICD10 and CPT if appropriate.

## 2020-11-20 ENCOUNTER — APPOINTMENT (OUTPATIENT)
Dept: INTERNAL MEDICINE | Facility: CLINIC | Age: 25
End: 2020-11-20

## 2020-12-02 ENCOUNTER — APPOINTMENT (OUTPATIENT)
Dept: INTERNAL MEDICINE | Facility: CLINIC | Age: 25
End: 2020-12-02

## 2021-02-17 NOTE — BRIEF OPERATIVE NOTE - PROCEDURE
Head, normocephalic, atraumatic, Face, Face within normal limits, Ears, External ears within normal limits, Nose/Nasopharynx, External nose  normal appearance, nares patent, no nasal discharge, Mouth and Throat, Oral cavity appearance normal, Breath odor normal, Lips, Appearance normal <<-----Click on this checkbox to enter Procedure Aspiration curettage of uterus for termination of pregnancy  10/25/2018    Active  [id number]

## 2021-06-24 ENCOUNTER — APPOINTMENT (OUTPATIENT)
Dept: OBGYN | Facility: CLINIC | Age: 26
End: 2021-06-24

## 2021-06-28 ENCOUNTER — APPOINTMENT (OUTPATIENT)
Dept: OBGYN | Facility: CLINIC | Age: 26
End: 2021-06-28

## 2021-07-02 ENCOUNTER — EMERGENCY (EMERGENCY)
Facility: HOSPITAL | Age: 26
LOS: 0 days | Discharge: HOME | End: 2021-07-02
Attending: EMERGENCY MEDICINE | Admitting: EMERGENCY MEDICINE
Payer: MEDICAID

## 2021-07-02 VITALS
HEART RATE: 84 BPM | DIASTOLIC BLOOD PRESSURE: 76 MMHG | OXYGEN SATURATION: 100 % | RESPIRATION RATE: 18 BRPM | SYSTOLIC BLOOD PRESSURE: 135 MMHG

## 2021-07-02 VITALS
HEART RATE: 78 BPM | OXYGEN SATURATION: 100 % | RESPIRATION RATE: 18 BRPM | DIASTOLIC BLOOD PRESSURE: 81 MMHG | SYSTOLIC BLOOD PRESSURE: 130 MMHG

## 2021-07-02 DIAGNOSIS — R11.2 NAUSEA WITH VOMITING, UNSPECIFIED: ICD-10-CM

## 2021-07-02 DIAGNOSIS — F90.9 ATTENTION-DEFICIT HYPERACTIVITY DISORDER, UNSPECIFIED TYPE: ICD-10-CM

## 2021-07-02 DIAGNOSIS — K52.9 NONINFECTIVE GASTROENTERITIS AND COLITIS, UNSPECIFIED: ICD-10-CM

## 2021-07-02 DIAGNOSIS — F12.10 CANNABIS ABUSE, UNCOMPLICATED: ICD-10-CM

## 2021-07-02 DIAGNOSIS — Z87.2 PERSONAL HISTORY OF DISEASES OF THE SKIN AND SUBCUTANEOUS TISSUE: ICD-10-CM

## 2021-07-02 DIAGNOSIS — Z98.890 OTHER SPECIFIED POSTPROCEDURAL STATES: Chronic | ICD-10-CM

## 2021-07-02 DIAGNOSIS — F17.200 NICOTINE DEPENDENCE, UNSPECIFIED, UNCOMPLICATED: ICD-10-CM

## 2021-07-02 DIAGNOSIS — O03.9 COMPLETE OR UNSPECIFIED SPONTANEOUS ABORTION WITHOUT COMPLICATION: Chronic | ICD-10-CM

## 2021-07-02 DIAGNOSIS — R10.84 GENERALIZED ABDOMINAL PAIN: ICD-10-CM

## 2021-07-02 LAB
ALBUMIN SERPL ELPH-MCNC: 4.9 G/DL — SIGNIFICANT CHANGE UP (ref 3.5–5.2)
ALP SERPL-CCNC: 38 U/L — SIGNIFICANT CHANGE UP (ref 30–115)
ALT FLD-CCNC: 19 U/L — SIGNIFICANT CHANGE UP (ref 0–41)
ANION GAP SERPL CALC-SCNC: 15 MMOL/L — HIGH (ref 7–14)
APPEARANCE UR: CLEAR — SIGNIFICANT CHANGE UP
AST SERPL-CCNC: 29 U/L — SIGNIFICANT CHANGE UP (ref 0–41)
BACTERIA # UR AUTO: NEGATIVE — SIGNIFICANT CHANGE UP
BASOPHILS # BLD AUTO: 0.05 K/UL — SIGNIFICANT CHANGE UP (ref 0–0.2)
BASOPHILS NFR BLD AUTO: 1 % — SIGNIFICANT CHANGE UP (ref 0–1)
BILIRUB SERPL-MCNC: 0.7 MG/DL — SIGNIFICANT CHANGE UP (ref 0.2–1.2)
BILIRUB UR-MCNC: NEGATIVE — SIGNIFICANT CHANGE UP
BUN SERPL-MCNC: 19 MG/DL — SIGNIFICANT CHANGE UP (ref 10–20)
CALCIUM SERPL-MCNC: 9.4 MG/DL — SIGNIFICANT CHANGE UP (ref 8.5–10.1)
CHLORIDE SERPL-SCNC: 103 MMOL/L — SIGNIFICANT CHANGE UP (ref 98–110)
CO2 SERPL-SCNC: 22 MMOL/L — SIGNIFICANT CHANGE UP (ref 17–32)
COLOR SPEC: YELLOW — SIGNIFICANT CHANGE UP
CREAT SERPL-MCNC: 0.8 MG/DL — SIGNIFICANT CHANGE UP (ref 0.7–1.5)
DIFF PNL FLD: ABNORMAL
EOSINOPHIL # BLD AUTO: 0.01 K/UL — SIGNIFICANT CHANGE UP (ref 0–0.7)
EOSINOPHIL NFR BLD AUTO: 0.2 % — SIGNIFICANT CHANGE UP (ref 0–8)
EPI CELLS # UR: 5 /HPF — SIGNIFICANT CHANGE UP (ref 0–5)
GLUCOSE SERPL-MCNC: 120 MG/DL — HIGH (ref 70–99)
GLUCOSE UR QL: NEGATIVE — SIGNIFICANT CHANGE UP
HCG SERPL QL: NEGATIVE — SIGNIFICANT CHANGE UP
HCT VFR BLD CALC: 43.5 % — SIGNIFICANT CHANGE UP (ref 37–47)
HGB BLD-MCNC: 14.5 G/DL — SIGNIFICANT CHANGE UP (ref 12–16)
HYALINE CASTS # UR AUTO: 3 /LPF — SIGNIFICANT CHANGE UP (ref 0–7)
IMM GRANULOCYTES NFR BLD AUTO: 0.2 % — SIGNIFICANT CHANGE UP (ref 0.1–0.3)
KETONES UR-MCNC: NEGATIVE — SIGNIFICANT CHANGE UP
LACTATE SERPL-SCNC: 1.2 MMOL/L — SIGNIFICANT CHANGE UP (ref 0.7–2)
LACTATE SERPL-SCNC: 3.2 MMOL/L — HIGH (ref 0.7–2)
LEUKOCYTE ESTERASE UR-ACNC: NEGATIVE — SIGNIFICANT CHANGE UP
LIDOCAIN IGE QN: 19 U/L — SIGNIFICANT CHANGE UP (ref 7–60)
LYMPHOCYTES # BLD AUTO: 1.68 K/UL — SIGNIFICANT CHANGE UP (ref 1.2–3.4)
LYMPHOCYTES # BLD AUTO: 34.7 % — SIGNIFICANT CHANGE UP (ref 20.5–51.1)
MCHC RBC-ENTMCNC: 27.9 PG — SIGNIFICANT CHANGE UP (ref 27–31)
MCHC RBC-ENTMCNC: 33.3 G/DL — SIGNIFICANT CHANGE UP (ref 32–37)
MCV RBC AUTO: 83.8 FL — SIGNIFICANT CHANGE UP (ref 81–99)
MONOCYTES # BLD AUTO: 0.23 K/UL — SIGNIFICANT CHANGE UP (ref 0.1–0.6)
MONOCYTES NFR BLD AUTO: 4.8 % — SIGNIFICANT CHANGE UP (ref 1.7–9.3)
NEUTROPHILS # BLD AUTO: 2.86 K/UL — SIGNIFICANT CHANGE UP (ref 1.4–6.5)
NEUTROPHILS NFR BLD AUTO: 59.1 % — SIGNIFICANT CHANGE UP (ref 42.2–75.2)
NITRITE UR-MCNC: NEGATIVE — SIGNIFICANT CHANGE UP
NRBC # BLD: 0 /100 WBCS — SIGNIFICANT CHANGE UP (ref 0–0)
PH UR: 8 — SIGNIFICANT CHANGE UP (ref 5–8)
PLATELET # BLD AUTO: 269 K/UL — SIGNIFICANT CHANGE UP (ref 130–400)
POTASSIUM SERPL-MCNC: 3.9 MMOL/L — SIGNIFICANT CHANGE UP (ref 3.5–5)
POTASSIUM SERPL-SCNC: 3.9 MMOL/L — SIGNIFICANT CHANGE UP (ref 3.5–5)
PROT SERPL-MCNC: 8 G/DL — SIGNIFICANT CHANGE UP (ref 6–8)
PROT UR-MCNC: ABNORMAL
RBC # BLD: 5.19 M/UL — SIGNIFICANT CHANGE UP (ref 4.2–5.4)
RBC # FLD: 13.5 % — SIGNIFICANT CHANGE UP (ref 11.5–14.5)
RBC CASTS # UR COMP ASSIST: 1 /HPF — SIGNIFICANT CHANGE UP (ref 0–4)
SODIUM SERPL-SCNC: 140 MMOL/L — SIGNIFICANT CHANGE UP (ref 135–146)
SP GR SPEC: >1.05 (ref 1.01–1.03)
UROBILINOGEN FLD QL: SIGNIFICANT CHANGE UP
WBC # BLD: 4.84 K/UL — SIGNIFICANT CHANGE UP (ref 4.8–10.8)
WBC # FLD AUTO: 4.84 K/UL — SIGNIFICANT CHANGE UP (ref 4.8–10.8)
WBC UR QL: 2 /HPF — SIGNIFICANT CHANGE UP (ref 0–5)

## 2021-07-02 PROCEDURE — 74177 CT ABD & PELVIS W/CONTRAST: CPT | Mod: 26,MA

## 2021-07-02 PROCEDURE — 99285 EMERGENCY DEPT VISIT HI MDM: CPT

## 2021-07-02 RX ORDER — MORPHINE SULFATE 50 MG/1
2 CAPSULE, EXTENDED RELEASE ORAL ONCE
Refills: 0 | Status: DISCONTINUED | OUTPATIENT
Start: 2021-07-02 | End: 2021-07-02

## 2021-07-02 RX ORDER — SODIUM CHLORIDE 9 MG/ML
1000 INJECTION INTRAMUSCULAR; INTRAVENOUS; SUBCUTANEOUS ONCE
Refills: 0 | Status: COMPLETED | OUTPATIENT
Start: 2021-07-02 | End: 2021-07-02

## 2021-07-02 RX ORDER — ONDANSETRON 8 MG/1
4 TABLET, FILM COATED ORAL ONCE
Refills: 0 | Status: COMPLETED | OUTPATIENT
Start: 2021-07-02 | End: 2021-07-02

## 2021-07-02 RX ORDER — CIPROFLOXACIN LACTATE 400MG/40ML
1 VIAL (ML) INTRAVENOUS
Qty: 30 | Refills: 0
Start: 2021-07-02 | End: 2021-07-11

## 2021-07-02 RX ORDER — MOXIFLOXACIN HYDROCHLORIDE TABLETS, 400 MG 400 MG/1
1 TABLET, FILM COATED ORAL
Qty: 20 | Refills: 0
Start: 2021-07-02 | End: 2021-07-11

## 2021-07-02 RX ORDER — METRONIDAZOLE 500 MG
1 TABLET ORAL
Qty: 30 | Refills: 0
Start: 2021-07-02 | End: 2021-07-11

## 2021-07-02 RX ORDER — FAMOTIDINE 10 MG/ML
20 INJECTION INTRAVENOUS ONCE
Refills: 0 | Status: COMPLETED | OUTPATIENT
Start: 2021-07-02 | End: 2021-07-02

## 2021-07-02 RX ADMIN — ONDANSETRON 4 MILLIGRAM(S): 8 TABLET, FILM COATED ORAL at 15:49

## 2021-07-02 RX ADMIN — MORPHINE SULFATE 2 MILLIGRAM(S): 50 CAPSULE, EXTENDED RELEASE ORAL at 17:14

## 2021-07-02 RX ADMIN — ONDANSETRON 4 MILLIGRAM(S): 8 TABLET, FILM COATED ORAL at 17:14

## 2021-07-02 RX ADMIN — MORPHINE SULFATE 2 MILLIGRAM(S): 50 CAPSULE, EXTENDED RELEASE ORAL at 15:49

## 2021-07-02 RX ADMIN — SODIUM CHLORIDE 1000 MILLILITER(S): 9 INJECTION INTRAMUSCULAR; INTRAVENOUS; SUBCUTANEOUS at 15:49

## 2021-07-02 RX ADMIN — SODIUM CHLORIDE 1000 MILLILITER(S): 9 INJECTION INTRAMUSCULAR; INTRAVENOUS; SUBCUTANEOUS at 17:14

## 2021-07-02 RX ADMIN — FAMOTIDINE 20 MILLIGRAM(S): 10 INJECTION INTRAVENOUS at 19:45

## 2021-07-02 NOTE — ED PROVIDER NOTE - PROGRESS NOTE DETAILS
TA: Patient states she feels much better; tolerating PO; will d/c with flagyl/cipro prescription and GI follow up.

## 2021-07-02 NOTE — ED PROVIDER NOTE - PATIENT PORTAL LINK FT
You can access the FollowMyHealth Patient Portal offered by [facility] by registering at the following website: [email or web address]. By joining [facility]’s FollowMyHealth portal, you will also be able to view your health information using other applications (apps) compatible with our system.

## 2021-07-02 NOTE — ED ADULT TRIAGE NOTE - CHIEF COMPLAINT QUOTE
c/o abdominal pain and vomiting. Patient states she drank a lot of alcohol yesterday.  +vaginal bleeding unknown pregnancy.

## 2021-07-02 NOTE — ED PROVIDER NOTE - NSFOLLOWUPCLINICS_GEN_ALL_ED_FT
[facility]  Medicine  [address], [place]   Phone: [phone]  Fax:   Follow Up Time: 1-3 Days

## 2021-07-02 NOTE — ED PROVIDER NOTE - PHYSICAL EXAMINATION
CONSTITUTIONAL: Well-developed; well-nourished; appears uncomfortable.   SKIN: warm, dry  HEAD: Normocephalic; atraumatic.  EYES: normal sclera and conjunctiva   ENT: No nasal discharge; airway clear.  NECK: Supple; non tender.  CARD: S1, S2 normal; no murmurs, gallops, or rubs. Regular rate and rhythm.   RESP: No wheezes, rales or rhonchi.  ABD: soft +epigastric ttp.   EXT: Normal ROM.  No clubbing, cyanosis or edema.   LYMPH: No acute cervical adenopathy.  NEURO: Alert, oriented, grossly unremarkable  PSYCH: Cooperative, appropriate.

## 2021-07-02 NOTE — ED PROVIDER NOTE - CLINICAL SUMMARY MEDICAL DECISION MAKING FREE TEXT BOX
24yo F presenting with abdominal pain, nausea, vomiting. no diarrhea. Well appearing, NAD, non toxic. NCAT PERRLA EOMI neck supple non tender normal wob cta bl rrr abdomen s mild diffuse ttp nd no rebound no guarding WWPx4 neuro non focal. labs imaging reviewed. Pt feeling improved, tolerating PO, abdomen soft, non-tender, non-distended, no rebound, no guarding. Aware of all results, given a copy of all available results, comfortable with discharge and follow-up outpatient, strict return precautions given. Endorses understanding of all of this and aware that they can return at any time for new or concerning symptoms. No further questions or concerns at this time

## 2021-07-02 NOTE — ED PROVIDER NOTE - OBJECTIVE STATEMENT
The patient is a 25 year old female, daily marijuana smoker presenting for nausea, vomiting and abdmoninal pain. States she was drinking alcohol last night and began vomiting multiple times and c/o diffuse abdominal pain that is 8/10, non radiating. States she began her period today. No urinary symptoms. The patient is a 25 year old female, daily marijuana smoker presenting for nausea, vomiting and abdominal pain. States she was drinking alcohol last night and began vomiting multiple times and c/o diffuse abdominal pain that is 8/10, non radiating. States she began her period today. No urinary symptoms.

## 2021-07-02 NOTE — ED PROVIDER NOTE - NS ED ROS FT
Eyes:  No visual changes, eye pain or discharge.  ENMT:  No hearing changes, pain, discharge or infections. No neck pain or stiffness.  Cardiac:  No chest pain, SOB or edema. No chest pain with exertion.  Respiratory:  No cough or respiratory distress. No hemoptysis. .  GI:  + nausea, vomiting,+ abdominal pain.  :  No dysuria, frequency or burning.  MS:  No myalgia, muscle weakness, joint pain or back pain.  Neuro:  No headache or weakness.  No LOC.  Skin:  No skin rash.   Endocrine: No history of thyroid disease or diabetes.

## 2021-07-02 NOTE — ED PROVIDER NOTE - CARE PROVIDER_API CALL
Darrius Brantley)  Gastroenterology; Internal Medicine  [address] [place] [address]  Phone: [phone]  Fax: [phone]  Follow Up Time: 1-3 Days

## 2021-07-05 LAB
CULTURE RESULTS: SIGNIFICANT CHANGE UP
SPECIMEN SOURCE: SIGNIFICANT CHANGE UP

## 2021-07-07 ENCOUNTER — EMERGENCY (EMERGENCY)
Facility: HOSPITAL | Age: 26
LOS: 0 days | Discharge: HOME | End: 2021-07-07
Attending: EMERGENCY MEDICINE | Admitting: EMERGENCY MEDICINE
Payer: MEDICAID

## 2021-07-07 VITALS
HEIGHT: 64 IN | RESPIRATION RATE: 17 BRPM | HEART RATE: 73 BPM | OXYGEN SATURATION: 98 % | DIASTOLIC BLOOD PRESSURE: 82 MMHG | SYSTOLIC BLOOD PRESSURE: 131 MMHG | TEMPERATURE: 98 F

## 2021-07-07 VITALS
SYSTOLIC BLOOD PRESSURE: 118 MMHG | RESPIRATION RATE: 17 BRPM | TEMPERATURE: 98 F | DIASTOLIC BLOOD PRESSURE: 58 MMHG | HEART RATE: 66 BPM | OXYGEN SATURATION: 98 %

## 2021-07-07 DIAGNOSIS — R10.9 UNSPECIFIED ABDOMINAL PAIN: ICD-10-CM

## 2021-07-07 DIAGNOSIS — R10.32 LEFT LOWER QUADRANT PAIN: ICD-10-CM

## 2021-07-07 DIAGNOSIS — Z98.890 OTHER SPECIFIED POSTPROCEDURAL STATES: Chronic | ICD-10-CM

## 2021-07-07 DIAGNOSIS — Z79.899 OTHER LONG TERM (CURRENT) DRUG THERAPY: ICD-10-CM

## 2021-07-07 DIAGNOSIS — O03.9 COMPLETE OR UNSPECIFIED SPONTANEOUS ABORTION WITHOUT COMPLICATION: Chronic | ICD-10-CM

## 2021-07-07 DIAGNOSIS — K52.9 NONINFECTIVE GASTROENTERITIS AND COLITIS, UNSPECIFIED: ICD-10-CM

## 2021-07-07 PROCEDURE — 99284 EMERGENCY DEPT VISIT MOD MDM: CPT

## 2021-07-07 RX ORDER — ACETAMINOPHEN 500 MG
650 TABLET ORAL ONCE
Refills: 0 | Status: COMPLETED | OUTPATIENT
Start: 2021-07-07 | End: 2021-07-07

## 2021-07-07 RX ADMIN — Medication 650 MILLIGRAM(S): at 16:30

## 2021-07-07 NOTE — ED ADULT TRIAGE NOTE - NS ED NURSE AMBULANCES
"""Informed patient that their capsular opacification is not visually significant or does not meet the minimum criteria for capsulotomy.  Recommended attention to PCO symptoms [facility]

## 2021-07-07 NOTE — ED PROVIDER NOTE - CARE PROVIDER_API CALL
Jae Fountain J  GASTROENTEROLOGY  [address] [place] [id number]  Phone: [phone]  Fax: [phone]  Follow Up Time: 1-3 Days

## 2021-07-07 NOTE — ED PROVIDER NOTE - NSFOLLOWUPINSTRUCTIONS_ED_ALL_ED_FT
Abdominal Pain, Adult     Many things can cause belly (abdominal) pain. Most times, belly pain is not dangerous. Many cases of belly pain can be watched and treated at home. Sometimes belly pain is serious, though. Your doctor will try to find the cause of your belly pain.  Follow these instructions at home:  Take over-the-counter and prescription medicines only as told by your doctor. Do not take medicines that help you poop (laxatives) unless told to by your doctor.Drink enough fluid to keep your pee (urine) clear or pale yellow.Watch your belly pain for any changes.Keep all follow-up visits as told by your doctor. This is important.Contact a doctor if:  Your belly pain changes or gets worse.You are not hungry, or you lose weight without trying.You are having trouble pooping (constipated) or have watery poop (diarrhea) for more than 2–3 days.You have pain when you pee or poop.Your belly pain wakes you up at night.Your pain gets worse with meals, after eating, or with certain foods.You are throwing up and cannot keep anything down.You have a fever.Get help right away if:  Your pain does not go away as soon as your doctor says it should.You cannot stop throwing up.Your pain is only in areas of your belly, such as the right side or the left lower part of the belly.You have bloody or black poop, or poop that looks like tar.You have very bad pain, cramping, or bloating in your belly.You have signs of not having enough fluid or water in your body (dehydration), such as:  Dark pee, very little pee, or no pee.Cracked lips.Dry mouth.Sunken eyes.Sleepiness.Weakness.This information is not intended to replace advice given to you by your health care provider. Make sure you discuss any questions you have with your health care provider.    Document Released: 06/05/2009 Document Revised: 07/07/2017 Document Reviewed: 05/31/2017  [employer] Interactive Patient Education © 2019 [employer]

## 2021-07-07 NOTE — ED ADULT NURSE NOTE - OBJECTIVE STATEMENT
Pt presented to ED c/o med eval. As per pt, pt c/o intermittent abd pain, n/v, and headache x2 days. Pt recently seen in ED for same complaints. Denies d/fevers/chills. Pt A&Ox4, ambulatory.

## 2021-07-07 NOTE — ED PROVIDER NOTE - OBJECTIVE STATEMENT
25 y.o. female without any PMH presented to the ER c/o abdominal cramping for the past week.  Pt seen and treated in this ER for colitis.  Pt taking po anbx but has not f/u with GI yet or scheduled f/u.  Denies fever, chills, nausea, vomiting, diarrhea, abnormal vaginal bleeding/d/c.  Symptoms aren't worse but have not gone away so pt returned to ER.

## 2021-07-07 NOTE — ED PROVIDER NOTE - PROGRESS NOTE DETAILS
pt hungry, will f/u with GI out-pt, return precautions given and understood pt hungry and tolerated po intake, will f/u with GI out-pt, return precautions given and understood

## 2021-07-07 NOTE — ED ADULT NURSE NOTE - NSIMPLEMENTINTERV_GEN_ALL_ED
Implemented All Universal Safety Interventions:  [place] to call system. Call bell, personal items and telephone within reach. Instruct patient to call for assistance. Room bathroom lighting operational. Non-slip footwear when patient is off stretcher. Physically safe environment: no spills, clutter or unnecessary equipment. Stretcher in lowest position, wheels locked, appropriate side rails in place.

## 2021-07-07 NOTE — ED PROVIDER NOTE - CLINICAL SUMMARY MEDICAL DECISION MAKING FREE TEXT BOX
Upreg negative.  Feels better after acetaminophen.  D/C home.  F/U with GI as scheduled.  Strict return instructions discussed.

## 2021-07-07 NOTE — ED PROVIDER NOTE - PATIENT PORTAL LINK FT
You can access the FollowMyHealth Patient Portal offered by [facility] by registering at the following website: [email or web address]. By joining [employer]’s FollowMyHealth portal, you will also be able to view your health information using other applications (apps) compatible with our system.

## 2021-07-07 NOTE — ED PROVIDER NOTE - ATTENDING CONTRIBUTION TO CARE
24 y/o female with no relevant PMHx, seen at [facility] this week and tx for colitis presents to ED for abdominal cramping x 1 week.  Taking Abx as prescribed.  No fever or chills.  No N/V.  No diarrhea.  No dysuria or vaginal bleeding.  PE:  agree with above.  A/P:  Colitis.  Abdomen soft.  Continue meds. D/C home.

## 2021-08-11 NOTE — ED ADULT NURSE NOTE - NSIMPLEMENTINTERV_GEN_ALL_ED
Never
Implemented All Universal Safety Interventions:  [place] to call system. Call bell, personal items and telephone within reach. Instruct patient to call for assistance. Room bathroom lighting operational. Non-slip footwear when patient is off stretcher. Physically safe environment: no spills, clutter or unnecessary equipment. Stretcher in lowest position, wheels locked, appropriate side rails in place.

## 2021-08-25 NOTE — OB RN PATIENT PROFILE - INFANT HOME WITH MOTHER, OB PROFILE
Sami Goldman)  Orthopaedic Surgery  [address], [address]  [place] [address]  Phone: [phone]  Fax: [phone]  Follow Up Time: 4-6 Days   yes

## 2021-09-15 NOTE — ASU PREOP CHECKLIST - HEIGHT IN CM
Antepartum Progress Note    Subjective  No acute events overnight.  Denies loss of fluid, contractions, decreased fetal movement.  Review of systems denies fevers, chills, ha, sob, cp, n/v/d, constipation, abd pain, vaginal discharge/bleeding, urinary complaints, LE pain/swelling    Objective  Vitals:    09/15/21 0445   BP: 94/51   Pulse: (!) 106   Resp: 16   Temp: 98.8 °F (37.1 °C)     Body mass index is 31.83 kg/m².  General: no acute distress, alert, resting comfortably, cooperative, aaox3  HEENT: PERRLA. External ears normal, nose normal - no erythema or discharge.  Heart: regular rate and rhythm.  Lungs: no increased work of breathing.  Abdomen: soft, nontender, nondistended, no abnormal masses. Gravid.  Extremities: no calf tenderness. No erythema. No bilateral lower extremity swelling above physiological.  Neurological: Normal speech. No focal findings or movement disorder noted.  Psychological: cooperative, appropriate mood and affect, normal judgement.     Recent Labs   Lab 21  2220   WBC 6.2   HGB 12.4   HCT 35.9*   *     No results found    Invalid input(s): CL, PROT, BILITOT, ALKPHOS    EFM: baseline 150, mod variability, + accel, - decel  [place]: occasional  Reactive NST    Assessment/plan  Patient is a 22 year old  @35w4d a/f symptomatic covid in the setting of pregnancy.    Symptomatic covid in the setting of pregnancy  -continues to saturate above 95%  -can discharge after rounding  -can set up for monoclonal ab at infusion center     Late decels  -reactive with no decels since admission     FEN RD, HLIV     FWB CEFM     MWB PNV     GI maalox, tums, colace, pepcid prn     PPx amb, scd     MOB SVB     To be reviewed with Dr. Beatty, antepartum team ([id number])    Smooth Gracia MD  Obstetrics and Gynecology, PGY2   157.48

## 2021-10-05 ENCOUNTER — RESULT CHARGE (OUTPATIENT)
Age: 26
End: 2021-10-05

## 2021-10-06 ENCOUNTER — APPOINTMENT (OUTPATIENT)
Dept: OBGYN | Facility: CLINIC | Age: 26
End: 2021-10-06
Payer: MEDICAID

## 2021-10-06 ENCOUNTER — LABORATORY RESULT (OUTPATIENT)
Age: 26
End: 2021-10-06

## 2021-10-06 ENCOUNTER — OUTPATIENT (OUTPATIENT)
Dept: OUTPATIENT SERVICES | Facility: HOSPITAL | Age: 26
LOS: 1 days | Discharge: HOME | End: 2021-10-06

## 2021-10-06 ENCOUNTER — NON-APPOINTMENT (OUTPATIENT)
Age: 26
End: 2021-10-06

## 2021-10-06 VITALS — WEIGHT: 105 LBS | SYSTOLIC BLOOD PRESSURE: 100 MMHG | DIASTOLIC BLOOD PRESSURE: 60 MMHG | BODY MASS INDEX: 18.6 KG/M2

## 2021-10-06 DIAGNOSIS — N64.3 GALACTORRHEA NOT ASSOCIATED WITH CHILDBIRTH: ICD-10-CM

## 2021-10-06 DIAGNOSIS — B37.3 CANDIDIASIS OF VULVA AND VAGINA: ICD-10-CM

## 2021-10-06 DIAGNOSIS — Z98.890 OTHER SPECIFIED POSTPROCEDURAL STATES: Chronic | ICD-10-CM

## 2021-10-06 DIAGNOSIS — O03.9 COMPLETE OR UNSPECIFIED SPONTANEOUS ABORTION WITHOUT COMPLICATION: Chronic | ICD-10-CM

## 2021-10-06 PROCEDURE — 99395 PREV VISIT EST AGE 18-39: CPT

## 2021-10-06 PROCEDURE — 99213 OFFICE O/P EST LOW 20 MIN: CPT | Mod: 25

## 2021-10-06 RX ORDER — TERCONAZOLE 4 MG/G
0.4 CREAM VAGINAL
Qty: 1 | Refills: 1 | Status: ACTIVE | COMMUNITY
Start: 2021-10-06 | End: 1900-01-01

## 2021-10-06 NOTE — HISTORY OF PRESENT ILLNESS
[FreeTextEntry1] : 25 y/o female lmp -missed last period for annual. Pt also c/o breast discharge. From right breast only, spontaneous, no masses. no weight loss\par \par \par pt also c/o discharge from vagina white and itchy\par upreg neg [PapSmeardate] : 2018

## 2021-10-06 NOTE — DISCUSSION/SUMMARY
[FreeTextEntry1] : #1 health maintenance\par \par #2 discharge\par hx taken\par exam done\par aptima\par meds sent\par \par #3 galactorrhea\par labs ordered sono of breast\par may need to cycle\par f/u 3-4 weeks

## 2021-10-07 DIAGNOSIS — B37.3 CANDIDIASIS OF VULVA AND VAGINA: ICD-10-CM

## 2021-10-07 LAB
HCG UR QL: NEGATIVE
QUALITY CONTROL: YES

## 2021-10-13 LAB
A VAGINAE DNA VAG QL NAA+PROBE: NORMAL
BVAB2 DNA VAG QL NAA+PROBE: NORMAL
C KRUSEI DNA VAG QL NAA+PROBE: NEGATIVE
C TRACH RRNA SPEC QL NAA+PROBE: NEGATIVE
HPV HIGH+LOW RISK DNA PNL CVX: NOT DETECTED
MEGA1 DNA VAG QL NAA+PROBE: NORMAL
N GONORRHOEA RRNA SPEC QL NAA+PROBE: NEGATIVE
T VAGINALIS RRNA SPEC QL NAA+PROBE: NEGATIVE

## 2021-10-14 LAB — CYTOLOGY CVX/VAG DOC THIN PREP: NORMAL

## 2021-11-03 ENCOUNTER — NON-APPOINTMENT (OUTPATIENT)
Age: 26
End: 2021-11-03

## 2021-11-03 ENCOUNTER — OUTPATIENT (OUTPATIENT)
Dept: OUTPATIENT SERVICES | Facility: HOSPITAL | Age: 26
LOS: 1 days | Discharge: HOME | End: 2021-11-03

## 2021-11-03 ENCOUNTER — APPOINTMENT (OUTPATIENT)
Dept: OBGYN | Facility: CLINIC | Age: 26
End: 2021-11-03

## 2021-11-03 ENCOUNTER — APPOINTMENT (OUTPATIENT)
Dept: INTERNAL MEDICINE | Facility: CLINIC | Age: 26
End: 2021-11-03
Payer: MEDICAID

## 2021-11-03 VITALS
DIASTOLIC BLOOD PRESSURE: 67 MMHG | TEMPERATURE: 97 F | HEART RATE: 57 BPM | BODY MASS INDEX: 19.14 KG/M2 | SYSTOLIC BLOOD PRESSURE: 103 MMHG | OXYGEN SATURATION: 98 % | HEIGHT: 63 IN | WEIGHT: 108 LBS

## 2021-11-03 DIAGNOSIS — F90.9 ATTENTION-DEFICIT HYPERACTIVITY DISORDER, UNSPECIFIED TYPE: ICD-10-CM

## 2021-11-03 DIAGNOSIS — O03.9 COMPLETE OR UNSPECIFIED SPONTANEOUS ABORTION WITHOUT COMPLICATION: Chronic | ICD-10-CM

## 2021-11-03 DIAGNOSIS — Z98.890 OTHER SPECIFIED POSTPROCEDURAL STATES: Chronic | ICD-10-CM

## 2021-11-03 PROCEDURE — 99213 OFFICE O/P EST LOW 20 MIN: CPT | Mod: GC

## 2021-11-03 NOTE — REVIEW OF SYSTEMS
[Fever] : no fever [Discharge] : no discharge [Earache] : no earache [Chest Pain] : no chest pain [Shortness Of Breath] : no shortness of breath [Abdominal Pain] : no abdominal pain

## 2021-11-03 NOTE — HISTORY OF PRESENT ILLNESS
[FreeTextEntry1] : Employement form filling & Flu Vaccine;  [de-identified] : 25 y/o F w/ pmhx of ADHD & Asthma; Both Controlled; .\par Takes no medications; \par Here for employment form filling and Flu vaccine; \par \par Denies Chest pain, abdominal pain, urinary symptoms.

## 2021-11-03 NOTE — PHYSICAL EXAM
[No Acute Distress] : no acute distress [Normal Sclera/Conjunctiva] : normal sclera/conjunctiva [Normal Outer Ear/Nose] : the outer ears and nose were normal in appearance [No Respiratory Distress] : no respiratory distress  [Normal Rate] : normal rate  [Regular Rhythm] : with a regular rhythm [Soft] : abdomen soft

## 2021-11-03 NOTE — HISTORY OF PRESENT ILLNESS
[FreeTextEntry1] : Employement form filling & Flu Vaccine;  [de-identified] : 27 y/o F w/ pmhx of ADHD & Asthma; Both Controlled; .\par Takes no medications; \par Here for employment form filling and Flu vaccine; \par \par Denies Chest pain, abdominal pain, urinary symptoms.

## 2021-11-03 NOTE — ASSESSMENT
[FreeTextEntry1] : 25 y/o F w/ pmhx of ADHD & Asthma; Both Controlled; .\par Takes no medications; \par Here for employment form filling and Flu vaccine; \par \par Denies Chest pain, abdominal pain, urinary symptoms. \par \par # hx of ADHD - Controlled off meds;\par \par # hx of Asthma - Controlled off meds; \par \par # HCM:\par - COVID Vaccine: Pfizer x2 doses completed on 2021\par - Flu Shot: Received Today (11/3)\par - Up to date on Pap smears; \par - Referred for Mammogram; \par - Sent for Mumps titer Quatiferon & HepB titers for employement forms; \par \par RTC in 1 weeks.

## 2021-11-04 LAB
HBV CORE IGM SER QL: NONREACTIVE
HBV SURFACE AB SER QL: NONREACTIVE
HBV SURFACE AG SER QL: NONREACTIVE
MEV IGG FLD QL IA: >300 AU/ML
MEV IGG+IGM SER-IMP: POSITIVE
MUV AB SER-ACNC: POSITIVE
MUV IGG SER QL IA: >300 AU/ML
RUBV IGG FLD-ACNC: 2.5 INDEX
RUBV IGG SER-IMP: POSITIVE
VZV AB TITR SER: POSITIVE
VZV IGG SER IF-ACNC: 310.9 INDEX

## 2021-11-09 DIAGNOSIS — F90.9 ATTENTION-DEFICIT HYPERACTIVITY DISORDER, UNSPECIFIED TYPE: ICD-10-CM

## 2021-11-09 DIAGNOSIS — Z00.00 ENCOUNTER FOR GENERAL ADULT MEDICAL EXAMINATION WITHOUT ABNORMAL FINDINGS: ICD-10-CM

## 2021-11-11 ENCOUNTER — APPOINTMENT (OUTPATIENT)
Dept: INTERNAL MEDICINE | Facility: CLINIC | Age: 26
End: 2021-11-11

## 2021-12-20 NOTE — OB RN TRIAGE NOTE - NS_MODEOFARRIVAL_OBGYN_ALL_OB
Car Acitretin Counseling:  I discussed with the patient the risks of acitretin including but not limited to hair loss, dry lips/skin/eyes, liver damage, hyperlipidemia, depression/suicidal ideation, photosensitivity.  Serious rare side effects can include but are not limited to pancreatitis, pseudotumor cerebri, bony changes, clot formation/stroke/heart attack.  Patient understands that alcohol is contraindicated since it can result in liver toxicity and significantly prolong the elimination of the drug by many years.

## 2021-12-22 ENCOUNTER — APPOINTMENT (OUTPATIENT)
Dept: INTERNAL MEDICINE | Facility: CLINIC | Age: 26
End: 2021-12-22

## 2022-02-04 ENCOUNTER — NON-APPOINTMENT (OUTPATIENT)
Age: 27
End: 2022-02-04

## 2022-03-02 ENCOUNTER — APPOINTMENT (OUTPATIENT)
Dept: OBGYN | Facility: CLINIC | Age: 27
End: 2022-03-02
Payer: MEDICAID

## 2022-03-02 ENCOUNTER — OUTPATIENT (OUTPATIENT)
Dept: OUTPATIENT SERVICES | Facility: HOSPITAL | Age: 27
LOS: 1 days | Discharge: HOME | End: 2022-03-02

## 2022-03-02 VITALS
WEIGHT: 115 LBS | SYSTOLIC BLOOD PRESSURE: 100 MMHG | HEIGHT: 63 IN | BODY MASS INDEX: 20.38 KG/M2 | DIASTOLIC BLOOD PRESSURE: 60 MMHG

## 2022-03-02 DIAGNOSIS — O03.9 COMPLETE OR UNSPECIFIED SPONTANEOUS ABORTION WITHOUT COMPLICATION: Chronic | ICD-10-CM

## 2022-03-02 DIAGNOSIS — Z98.890 OTHER SPECIFIED POSTPROCEDURAL STATES: Chronic | ICD-10-CM

## 2022-03-02 DIAGNOSIS — R79.89 OTHER SPECIFIED ABNORMAL FINDINGS OF BLOOD CHEMISTRY: ICD-10-CM

## 2022-03-02 PROCEDURE — 99213 OFFICE O/P EST LOW 20 MIN: CPT

## 2022-03-02 RX ORDER — CABERGOLINE 0.5 MG/1
0.5 TABLET ORAL
Qty: 5 | Refills: 12 | Status: ACTIVE | COMMUNITY
Start: 2022-03-02 | End: 1900-01-01

## 2022-03-02 NOTE — HISTORY OF PRESENT ILLNESS
[FreeTextEntry1] : 25 y/o female with aub and galactorrhea here for discssion of elevated prolactin after certified letter sent\par u/s of breast not done will resend\par will tx due to symptoms\par \par

## 2022-03-11 LAB — PROLACTIN SERPL-MCNC: 0.6 NG/ML

## 2022-04-04 NOTE — OB PROVIDER TRIAGE NOTE - NS_PARA_OBGYN_ALL_OB_NU
GYN PROCEDURE FOLLOW UP QUESTIONNAIRE    MD: Dr. Mercy Chandler    Procedure:  Hysteroscopy D&C and polypectomy    Procedure Date: 4/1/22    Date of Follow-Up Call:  4/4/22    Completed by:  Leslie Navarro RN      1.  How is your surgical site/dressing (if applicable)?  N/A  Clean and dry? N/A    2.  Does your surgical site/IV site - Redness? no  Swelling?  no  Bleeding?  no    3.  Pain:  Did you have post-op pain?  no      4.  Do you currently have a fever of 100.4 or greater? no    5.  Are you tolerating food? yes  Any nausea/vomiting? no    6.  Are you urinating? yes    7.  Have you had a bowel movement/flatus? yes    8.  Are you ambulating at least twice daily? yes    9.  Do you have any questions regarding your care or your post-op instructions? no  Do you have your post-op appointments scheduled? no  Do you know how to reach the office/provider on call with questions on future concerns? yes     0

## 2022-04-07 ENCOUNTER — EMERGENCY (EMERGENCY)
Facility: HOSPITAL | Age: 27
LOS: 0 days | Discharge: HOME | End: 2022-04-07
Attending: STUDENT IN AN ORGANIZED HEALTH CARE EDUCATION/TRAINING PROGRAM | Admitting: STUDENT IN AN ORGANIZED HEALTH CARE EDUCATION/TRAINING PROGRAM
Payer: MEDICAID

## 2022-04-07 VITALS
HEART RATE: 100 BPM | SYSTOLIC BLOOD PRESSURE: 124 MMHG | HEIGHT: 64 IN | DIASTOLIC BLOOD PRESSURE: 86 MMHG | WEIGHT: 138.01 LBS | RESPIRATION RATE: 18 BRPM | TEMPERATURE: 98 F | OXYGEN SATURATION: 99 %

## 2022-04-07 DIAGNOSIS — F12.10 CANNABIS ABUSE, UNCOMPLICATED: ICD-10-CM

## 2022-04-07 DIAGNOSIS — Z87.891 PERSONAL HISTORY OF NICOTINE DEPENDENCE: ICD-10-CM

## 2022-04-07 DIAGNOSIS — R51.9 HEADACHE, UNSPECIFIED: ICD-10-CM

## 2022-04-07 DIAGNOSIS — F90.9 ATTENTION-DEFICIT HYPERACTIVITY DISORDER, UNSPECIFIED TYPE: ICD-10-CM

## 2022-04-07 DIAGNOSIS — J02.9 ACUTE PHARYNGITIS, UNSPECIFIED: ICD-10-CM

## 2022-04-07 DIAGNOSIS — R05.2 SUBACUTE COUGH: ICD-10-CM

## 2022-04-07 DIAGNOSIS — O03.9 COMPLETE OR UNSPECIFIED SPONTANEOUS ABORTION WITHOUT COMPLICATION: Chronic | ICD-10-CM

## 2022-04-07 DIAGNOSIS — R11.10 VOMITING, UNSPECIFIED: ICD-10-CM

## 2022-04-07 DIAGNOSIS — Z20.822 CONTACT WITH AND (SUSPECTED) EXPOSURE TO COVID-19: ICD-10-CM

## 2022-04-07 DIAGNOSIS — Z98.890 OTHER SPECIFIED POSTPROCEDURAL STATES: Chronic | ICD-10-CM

## 2022-04-07 LAB
HCOV PNL SPEC NAA+PROBE: DETECTED
RAPID RVP RESULT: DETECTED
SARS-COV-2 RNA SPEC QL NAA+PROBE: SIGNIFICANT CHANGE UP

## 2022-04-07 PROCEDURE — 71046 X-RAY EXAM CHEST 2 VIEWS: CPT | Mod: 26

## 2022-04-07 PROCEDURE — 99284 EMERGENCY DEPT VISIT MOD MDM: CPT

## 2022-04-07 RX ORDER — IBUPROFEN 200 MG
400 TABLET ORAL ONCE
Refills: 0 | Status: COMPLETED | OUTPATIENT
Start: 2022-04-07 | End: 2022-04-07

## 2022-04-07 RX ORDER — IPRATROPIUM/ALBUTEROL SULFATE 18-103MCG
3 AEROSOL WITH ADAPTER (GRAM) INHALATION ONCE
Refills: 0 | Status: COMPLETED | OUTPATIENT
Start: 2022-04-07 | End: 2022-04-07

## 2022-04-07 RX ADMIN — Medication 3 MILLILITER(S): at 08:01

## 2022-04-07 RX ADMIN — Medication 400 MILLIGRAM(S): at 07:46

## 2022-04-07 NOTE — ED PROVIDER NOTE - PHYSICAL EXAMINATION
_  Vital signs reviewed; ABCs intact  GENERAL: Well nourished, comfortable  SKIN: Warm, dry  HEAD & NECK: NCAT, supple neck  EYES: EOMI, PER B/L, no scleral icterus, no conjunctival injection, no conjunctival pallor  ENT: MMM; uvula midline; +mild oropharyngeal erythema with post-nasal draining and mild cobblestoning in posterior oropharynx; no exudates; no cervical LAD  CARD: RRR, S1, S2; no murmurs, no rubs, no gallops  RESP: Normal respiratory effort, mildly decreased air movement; no rales, no wheezing  ABD: Soft, ND, NT, no rebound, no guarding, +BS; no CVAT  EXT: No edema; pulses palpable distally  NEUROMSK: Grossly intact  PSYCH: AAOx3, cooperative, appropriate

## 2022-04-07 NOTE — ED PROVIDER NOTE - OBJECTIVE STATEMENT
Pt is a 25 yo F, h/o ADHD, daily cannabis smoker. Presents for cough x 3wks. Patient reports that she has been coughing up white phlegm, and had an episode of post-tussive emesis last week x1. She then developed sore throat 2wks ago, as well as an occasional headache, primarily in her forehead and maxillary sinuses region. Patient used to smoke tobacco 5 yrs ago, but has not since then. No known history of asthma, no family history of asthma.   No other complaints - no fever/chills, nasal discharge/sore throat, CP, palpitations, SOB, cough, abd pain, NVD, dysuria, hematuria, new joint pain, FND, rashes, trauma. Pt is a 27 yo F, h/o ADHD, daily cannabis smoker. Presents for cough x 3wks. Patient reports that she has been coughing up white phlegm, and had an episode of post-tussive emesis last week x1. She then developed sore throat 2wks ago, as well as an occasional headache, primarily in her forehead and maxillary sinuses region. Patient used to smoke tobacco 5 yrs ago, but has not since then. No known history of asthma, no family history of asthma.   No other complaints - no fever/chills, CP, palpitations, SOB, abd pain, NVD, dysuria, hematuria, new joint pain, FND, rashes, trauma.

## 2022-04-07 NOTE — ED ADULT NURSE NOTE - NS ED NOTE ABUSE RESPONSE YN
Received pt AOx4, VSS, no complaints of pain, all medications given as ordered, per md pt ok transfer to med-surg, pending CT Chest, report called to RN on 2S, no acute signs of distress noted, pt transferred to room 203-1.   Yes no...

## 2022-04-07 NOTE — ED PROVIDER NOTE - PROGRESS NOTE DETAILS
Resident AO: Delayed entry. Given duonebs x1 for decreased air movement; reassessed, no wheezing. Sent RVP, given Motrin s/p negative urine pregnancy; pending CXR (given duration of symptoms).

## 2022-04-07 NOTE — ED PROVIDER NOTE - PATIENT PORTAL LINK FT
You can access the FollowMyHealth Patient Portal offered by [facility] by registering at the following website: [email or web address]. By joining [employer]’s FollowMyHealth portal, you will also be able to view your health information using other applications (apps) compatible with our system.

## 2022-04-07 NOTE — ED PROVIDER NOTE - NSFOLLOWUPINSTRUCTIONS_ED_ALL_ED_FT
Your visit in the emergency department today did not reveal anything immediately life-threatening.    However, it is important that you follow-up with your PRIMARY CARE PHYSICIAN within 3-5 days.  If you do not have a primary care physician, please call your health insurance to select one.  If you do not have health insurance, please schedule an appointment with the [facility]: [phone], located at [address] [place] [address].    For pain, you may take the following over-the-counter medication(s):  - Acetaminophen (Tylenol, Midol) 650-1000 mg up to every 4-6 hours, as needed (max 4000mg/24hrs), AND/OR  - Ibuprofen (Motrin, Advil) 400-800 mg up to every 6-8 hours, as needed (max 3200mg/24hrs)    ---------------------------------------------------------------------------------------------------    Upper Respiratory Infection, Adult      An upper respiratory infection (URI) affects the nose, throat, and upper air passages. URIs are caused by germs (viruses). The most common type of URI is often called "the common cold."    Medicines cannot cure URIs, but you can do things at home to relieve your symptoms. URIs usually get better within 7–10 days.    Follow these instructions at home:    Activity     •Rest as needed.    •If you have a fever, stay home from work or school until your fever is gone, or until your doctor says you may return to work or school.    •You should stay home until you cannot spread the infection anymore (you are not contagious).    •Your doctor may have you wear a face mask so you have less risk of spreading the infection.        Relieving symptoms     •Gargle with a salt-water mixture 3–4 times a day or as needed. To make a salt-water mixture, completely dissolve ½–1 tsp of salt in 1 cup of warm water.    •Use a cool-mist humidifier to add moisture to the air. This can help you breathe more easily.      Eating and drinking      •Drink enough fluid to keep your pee (urine) pale yellow.    •Eat soups and other clear broths.      General instructions      •Take over-the-counter and prescription medicines only as told by your doctor. These include cold medicines, fever reducers, and cough suppressants.    • Do not use any products that contain nicotine or tobacco. These include cigarettes and e-cigarettes. If you need help quitting, ask your doctor.    •Avoid being where people are smoking (avoid secondhand smoke).    •Make sure you get regular shots and get the flu shot every year.    •Keep all follow-up visits as told by your doctor. This is important.      How to avoid spreading infection to others      •Wash your hands often with soap and water. If you do not have soap and water, use hand .    •Avoid touching your mouth, face, eyes, or nose.    •Cough or sneeze into a tissue or your sleeve or elbow. Do not cough or sneeze into your hand or into the air.      Contact a doctor if:    •You are getting worse, not better.    •You have any of these:    •A fever.    •Chills.    •Brown or red mucus in your nose.    •Yellow or brown fluid (discharge)coming from your nose.    •Pain in your face, especially when you bend forward.    •Swollen neck glands.    •Pain with swallowing.    •White areas in the back of your throat.        Get help right away if:    •You have shortness of breath that gets worse.    •You have very bad or constant:    •Headache.    •Ear pain.    •Pain in your forehead, behind your eyes, and over your cheekbones (sinus pain).    •Chest pain.    •You have long-lasting (chronic) lung disease along with any of these:    •Wheezing.    •Long-lasting cough.    •Coughing up blood.    •A change in your usual mucus.    •You have a stiff neck.    •You have changes in your:    •Vision.    •Hearing.    •Thinking.    •Mood.          Summary    •An upper respiratory infection (URI) is caused by a germ called a virus. The most common type of URI is often called "the common cold."      •URIs usually get better within 7–10 days.      •Take over-the-counter and prescription medicines only as told by your doctor.      This information is not intended to replace advice given to you by your health care provider. Make sure you discuss any questions you have with your health care provider.      Document Revised: 08/26/2021 Document Reviewed: 08/26/2021    [employer]vier Patient Education © 2022 [employer]

## 2022-04-07 NOTE — ED ADULT TRIAGE NOTE - CHIEF COMPLAINT QUOTE
I've been coughing for three weeks, I threw up once last week, a little runny nose - patient   Mother denies fever

## 2022-04-07 NOTE — ED PROVIDER NOTE - CLINICAL SUMMARY MEDICAL DECISION MAKING FREE TEXT BOX
pt well appearing, sx c/w viral uri, xr w/o pna, viral panel sent   no resp distress, home care discussed   stressed need for OP pcp f/u

## 2022-04-07 NOTE — ED ADULT TRIAGE NOTE - BP NONINVASIVE SYSTOLIC (MM HG)
Chief complaint:   Chief Complaint   Patient presents with   • Gyn Exam     no hx of abnormal    • Follow-up     labs   • Imm/Inj     fluzone       Vitals:  Visit Vitals  /60   Pulse 72   Temp 97.9 °F (36.6 °C)   Resp 16   Ht 5' 2\" (1.575 m)   Wt 68 kg   BMI 27.44 kg/m²       HISTORY OF PRESENT ILLNESS     HPI  Patient here for her pap smear.  Last pap smear was 2 years ago which was negative.  She has never had an abnormal pap smear.  LMP was about 10 years ago, no bleeding since.  Still taking Vagifem 10 mcg twice a week.  At last visit, discussed risk of cancer, blood clots, and stroke.  She denies any pelvic pain, abnormal discharge, or dysuria.    Last mammogram was last month which was negative.  Her maternal aunt had breast cancer, but no primary relatives.    Patient's WBCs have often been the low end of normal.  Records from 2015 show WBC 4.5    Patient has osteopenia.  Prefers to avoid bisphosphanates at this time.  Trying to get enough calcium from her diet and taking vitamin d3 2000 iu daily.  Also looking to be more active, especially for her bilateral knee arthritis.    Other significant problems:  Patient Active Problem List    Diagnosis Date Noted   • Obsessive-compulsive disorder 08/09/2017     Priority: Low       PAST MEDICAL, FAMILY AND SOCIAL HISTORY     Medications:  Current Outpatient Prescriptions   Medication   • aspirin 81 MG tablet   • Multiple Vitamins-Minerals (MULTIVITAMIN ADULT PO)   • cholecalciferol (VITAMIN D3) 1000 UNITS tablet   • Ascorbic Acid (VITAMIN C) 500 MG tablet   • Lysine 500 MG Tab   • Glucosamine-Chondroitin 8726-7546 MG/30ML Liquid   • naproxen sodium (ALEVE) 220 MG tablet   • Phenylephrine-Guaifenesin  MG Tab   • oxcarbazepine (TRILEPTAL) 300 MG tablet   • sertraline (ZOLOFT) 100 MG tablet   • estradiol (VAGIFEM) 10 MCG vaginal tablet     No current facility-administered medications for this visit.        Allergies:  ALLERGIES:   Allergen Reactions   • Pcn  [Penicillins] ANAPHYLAXIS   • Sulfa Antibiotics RASH and ANAPHYLAXIS       Past Medical  History/Surgeries:  No past medical history on file.    No past surgical history on file.    Family History:  Family History   Problem Relation Age of Onset   • Cancer Maternal Aunt      breast cancer       Social History:  Social History   Substance Use Topics   • Smoking status: Never Smoker   • Smokeless tobacco: Never Used   • Alcohol use Not on file       REVIEW OF SYSTEMS     Review of Systems  As above.    PHYSICAL EXAM     Physical Exam    GENERAL:  Well developed and well nourished.  Grooming appropriate.    Visit Vitals  /60   Pulse 72   Temp 97.9 °F (36.6 °C)   Resp 16   Ht 5' 2\" (1.575 m)   Wt 68 kg   BMI 27.44 kg/m²     HENT:  Head normocephalic, atraumatic. Tympanic  membranes and external auditory canals are normal.  Nasopharyngeal mucosa  is normal.  Oropharyngeal mucosa is normal.  EYES: Extraocular movements  intact.  Pupils are equally responsive and reactive to light. Conjunctivae  pink.  Sclerae anicteric.  NECK: Trachea midline.  No thyromegaly or masses  LYMPH:  No supraclavicular or cervical adenopathy.  LUNGS:  Lung fields are clear to auscultation and percussion.  No chest tenderness to palpation.  No respiratory distress.  HEART:  Regular rate and rhythm.  No murmur, rub, gallop, or click.  No carotid bruits or JVD.  No edema in lower extremities bilaterally.  Distal pulses intact.  BREAST: No breast swelling, tenderness, discharge, bleeding, or mass.   ABDOMEN:  Soft and nontender.  No mass.  No hepatosplenomegaly.  GENITOURINARY:  Vagina normal and uterus normal.  No labial fusion. There is no rash, tenderness, lesion or injury on the left or right labia.  Cervix exhibits no motion tenderness, no discharge and no friability. Ovaries are not palpably enlarged.  No erythema, tenderness or bleeding around the vagina. No foreign body around the vagina. No vaginal discharge found.   RECTAL: not  performed  MUSCULOSKELETAL:  Normal gait, station, and muscle tone.  Neck supple with full range of motion.  Full range of motion of upper and lower extremities bilaterally.  5/5 strength in upper and lower extremities bilaterally.  SKIN:  Warm and dry.  No erythema, paleness, or rash.  NEUROLOGIC: No cranial nerve deficits.  Sensation intact.    PSYCH: Oriented to person, place, and time.  No acute distress.  Mood and affect appropriate.  Judgment and thought content are normal.    ASSESSMENT/PLAN     ASSESSMENT: (Z01.419) Well woman exam with routine gynecological exam  (primary encounter diagnosis)  Plan: THINPREP PAP TEST WITH HPV REFLEX    (D72.819) Leukopenia, unspecified type  Plan:   Recheck CBC & AUTO DIFFERENTIAL in 4 weeks    (M85.80) Osteopenia, unspecified location  Plan:   Patient will get 1200 mg of calcium between diet and supplementation.  Continue Vitamin d3 2000 iu daily  Weight bearing exercise when possible    (M17.0) Osteoarthritis of both knees, unspecified osteoarthritis type  Plan:   Stay active  Gerardo Qi    (Z23) Need for influenza vaccination  Plan: INFLUENZA QUADRIVALENT SPLIT PRES FREE 0.5 ML         VACCINE          Will call with results.  F/u in 6 months, sooner if necessary.       124

## 2022-04-07 NOTE — ED PROVIDER NOTE - ADDITIONAL NOTES AND INSTRUCTIONS:
This patient was seen in our Emergency Department today for an urgent issue.   Please excuse them from work and/or school for today.    They may return on the date above (or earlier if feeling better) with the following restrictions: activity as tolerated.

## 2022-04-07 NOTE — ED ADULT NURSE NOTE - OBJECTIVE STATEMENT
Pt presents with c/o cough, sore throat and runny nose x3 weeks. Reports 1 episode of vomiting. Denies fever, chills, SOB, diarrhea.

## 2022-04-07 NOTE — ED PROVIDER NOTE - ATTENDING CONTRIBUTION TO CARE
25 yo f hx adhd, daily smoker  pt presents for eval of cough x3 weeks.  pt endorses white phlegm. + 1 episode of post tussive emesis last week.  pt also endorses 2 weeks of sore throat.     vss  gen- NAD, aaox3  card-rrr  HENT- b/l dried nasal secretions, no tonsillar exudates or edema, uvula midline   lungs-ctab, no wheezing or rhonchi  neuro- full str/sensation, cn ii-xii grossly intact, normal coordination     will get rvp, cxr, trial of albuterol  likely viral uri

## 2022-04-18 NOTE — ED ADULT NURSE NOTE - DISCHARGE DATE/TIME
How Severe Is Your Skin Lesion?: mild Is This A New Presentation, Or A Follow-Up?: Skin Lesion 21-Jul-2019 13:00

## 2022-04-26 NOTE — ED PROVIDER NOTE - PRINCIPAL DIAGNOSIS
Problem: Mobility Impaired (Adult and Pediatric)  Goal: *Acute Goals and Plan of Care (Insert Text)  Description: FUNCTIONAL STATUS PRIOR TO ADMISSION: Patient required minimal assistance for basic and instrumental ADLs. HOME SUPPORT PRIOR TO ADMISSION: The patient lived at SNF . Physical Therapy Goals  Initiated 4/24/2022  1. Patient will move from supine to sit and sit to supine , scoot up and down, and roll side to side in bed with minimal assistance/contact guard assist within 7 day(s). 2.  Patient will transfer from bed to chair and chair to bed with minimal assistance/contact guard assist using the least restrictive device within 7 day(s). 3.  Patient will perform sit to stand with minimal assistance/contact guard assist within 7 day(s). 4.  Patient will ambulate with minimal assistance/contact guard assist for 200 feet with the least restrictive device within 7 day(s). Outcome: Progressing Towards Goal   PHYSICAL THERAPY TREATMENT  Patient: Deborah Sow (20 y.o. female)  Date: 4/26/2022  Diagnosis: Acute on chronic systolic (congestive) heart failure (HCC) [I50.23] Septic shock ([facility])       Precautions:    Chart, physical therapy assessment, plan of care and goals were reviewed. ASSESSMENT  Patient continues with skilled PT services and is progressing towards goals. She demonstrated improved sitting balance this date and moderately improved fear in falling. Completed 2 standing trials requiring mod x2 and min x2 respectively to stand to a RW. Standing limited by low endurance and flexed posture preventing her from advancing either LE and limiting standing to <20 seconds each. Facilitated transfer to a bedside chair via stand pivot transfer requiring maximum assist x1 and SBA of a 2nd. Left in NAD on a wheelchair cushion post session. Patient is below her functional baseline and at risk for additional falls. Recommend discharge to SNF level rehab at discharge.     Dicussed return to bed with nursing. Recommend turning the chair 180 degrees to utilize the drop-arm feature on her recliner and scooting back to bed with assist x2. Current Level of Function Impacting Discharge (mobility/balance): maximum assist x2 stand pivot           PLAN :  Patient continues to benefit from skilled intervention to address the above impairments. Continue treatment per established plan of care. to address goals. Recommendation for discharge: (in order for the patient to meet his/her long term goals)  Therapy up to 5 days/week in SNF setting    This discharge recommendation:  Has been made in collaboration with the attending provider and/or case management    IF patient discharges home will need the following DME: to be determined (TBD)       SUBJECTIVE:   Patient stated You all aren't moving fast enough!     OBJECTIVE DATA SUMMARY:   Critical Behavior:  Neurologic State: Alert  Orientation Level: Oriented X4  Cognition: Follows commands  Safety/Judgement: Awareness of environment,Fall prevention,Good awareness of safety precautions,Home safety,Insight into deficits  Functional Mobility Training:  Bed Mobility:  Rolling: Moderate assistance;Assist x2  Supine to Sit: Moderate assistance;Assist x2     Scooting: Moderate assistance;Assist x2        Transfers:  Sit to Stand: Moderate assistance;Assist x2;Minimum assistance (mod x2 -> min x2)                                Balance:  Sitting: Intact; High guard  Standing: Impaired; With support  Standing - Static: Fair  Standing - Dynamic : Fair;Poor     Therapeutic Exercises:   Supine ankle pumps, SAQs x10 each  Pain Rating:      Activity Tolerance:   Fair and -128 BPM    After treatment patient left in no apparent distress:   Sitting in chair and Call bell within reach    COMMUNICATION/COLLABORATION:   The patients plan of care was discussed with: Registered nurse.      Jose Nickerson PT, DPT   Time Calculation: 32 mins STD exposure

## 2022-05-02 ENCOUNTER — APPOINTMENT (OUTPATIENT)
Dept: OBGYN | Facility: CLINIC | Age: 27
End: 2022-05-02

## 2022-05-16 ENCOUNTER — EMERGENCY (EMERGENCY)
Facility: HOSPITAL | Age: 27
LOS: 0 days | Discharge: HOME | End: 2022-05-16
Attending: EMERGENCY MEDICINE | Admitting: EMERGENCY MEDICINE
Payer: MEDICAID

## 2022-05-16 VITALS
RESPIRATION RATE: 20 BRPM | HEIGHT: 64 IN | DIASTOLIC BLOOD PRESSURE: 73 MMHG | SYSTOLIC BLOOD PRESSURE: 110 MMHG | TEMPERATURE: 99 F | OXYGEN SATURATION: 98 % | HEART RATE: 81 BPM

## 2022-05-16 VITALS
RESPIRATION RATE: 18 BRPM | HEART RATE: 76 BPM | TEMPERATURE: 99 F | SYSTOLIC BLOOD PRESSURE: 112 MMHG | OXYGEN SATURATION: 99 % | DIASTOLIC BLOOD PRESSURE: 72 MMHG

## 2022-05-16 DIAGNOSIS — O03.9 COMPLETE OR UNSPECIFIED SPONTANEOUS ABORTION WITHOUT COMPLICATION: Chronic | ICD-10-CM

## 2022-05-16 DIAGNOSIS — O99.891 OTHER SPECIFIED DISEASES AND CONDITIONS COMPLICATING PREGNANCY: ICD-10-CM

## 2022-05-16 DIAGNOSIS — O98.519 OTHER VIRAL DISEASES COMPLICATING PREGNANCY, UNSPECIFIED TRIMESTER: ICD-10-CM

## 2022-05-16 DIAGNOSIS — Z86.19 PERSONAL HISTORY OF OTHER INFECTIOUS AND PARASITIC DISEASES: ICD-10-CM

## 2022-05-16 DIAGNOSIS — Z98.890 OTHER SPECIFIED POSTPROCEDURAL STATES: Chronic | ICD-10-CM

## 2022-05-16 DIAGNOSIS — O99.330 SMOKING (TOBACCO) COMPLICATING PREGNANCY, UNSPECIFIED TRIMESTER: ICD-10-CM

## 2022-05-16 DIAGNOSIS — B00.9 HERPESVIRAL INFECTION, UNSPECIFIED: ICD-10-CM

## 2022-05-16 DIAGNOSIS — F17.200 NICOTINE DEPENDENCE, UNSPECIFIED, UNCOMPLICATED: ICD-10-CM

## 2022-05-16 DIAGNOSIS — O99.340 OTHER MENTAL DISORDERS COMPLICATING PREGNANCY, UNSPECIFIED TRIMESTER: ICD-10-CM

## 2022-05-16 DIAGNOSIS — N89.8 OTHER SPECIFIED NONINFLAMMATORY DISORDERS OF VAGINA: ICD-10-CM

## 2022-05-16 DIAGNOSIS — F90.9 ATTENTION-DEFICIT HYPERACTIVITY DISORDER, UNSPECIFIED TYPE: ICD-10-CM

## 2022-05-16 DIAGNOSIS — O98.319 OTHER INFECTIONS WITH A PREDOMINANTLY SEXUAL MODE OF TRANSMISSION COMPLICATING PREGNANCY, UNSPECIFIED TRIMESTER: ICD-10-CM

## 2022-05-16 LAB
APPEARANCE UR: CLEAR — SIGNIFICANT CHANGE UP
BILIRUB UR-MCNC: NEGATIVE — SIGNIFICANT CHANGE UP
COLOR SPEC: SIGNIFICANT CHANGE UP
DIFF PNL FLD: NEGATIVE — SIGNIFICANT CHANGE UP
GLUCOSE UR QL: NEGATIVE — SIGNIFICANT CHANGE UP
KETONES UR-MCNC: NEGATIVE — SIGNIFICANT CHANGE UP
LEUKOCYTE ESTERASE UR-ACNC: NEGATIVE — SIGNIFICANT CHANGE UP
NITRITE UR-MCNC: NEGATIVE — SIGNIFICANT CHANGE UP
PH UR: 6.5 — SIGNIFICANT CHANGE UP (ref 5–8)
PROT UR-MCNC: SIGNIFICANT CHANGE UP
SP GR SPEC: 1.03 — SIGNIFICANT CHANGE UP (ref 1.01–1.03)
UROBILINOGEN FLD QL: SIGNIFICANT CHANGE UP

## 2022-05-16 PROCEDURE — 99284 EMERGENCY DEPT VISIT MOD MDM: CPT

## 2022-05-16 RX ORDER — CEFTRIAXONE 500 MG/1
500 INJECTION, POWDER, FOR SOLUTION INTRAMUSCULAR; INTRAVENOUS ONCE
Refills: 0 | Status: DISCONTINUED | OUTPATIENT
Start: 2022-05-16 | End: 2022-05-16

## 2022-05-16 RX ORDER — AZITHROMYCIN 500 MG/1
1 TABLET, FILM COATED ORAL ONCE
Refills: 0 | Status: DISCONTINUED | OUTPATIENT
Start: 2022-05-16 | End: 2022-05-16

## 2022-05-16 NOTE — ED PROVIDER NOTE - NS ED ATTENDING STATEMENT MOD
This was a shared visit with the WILLA. I reviewed and verified the documentation and independently performed the documented:

## 2022-05-16 NOTE — ED PROVIDER NOTE - CLINICAL SUMMARY MEDICAL DECISION MAKING FREE TEXT BOX
26-year-old female with 2 to 3 days of vaginal discharge.  Positive pregnancy test in the ED.  Patient treated for gonorrhea and chlamydia.  Patient instructed to follow-up with OB/GYN and given strict return instructions.

## 2022-05-16 NOTE — ED PROVIDER NOTE - PATIENT PORTAL LINK FT
You can access the FollowMyHealth Patient Portal offered by [facility] by registering at the following website: [email or web address]. By joining [employer]’s FollowMyHealth portal, you will also be able to view your health information using other applications (apps) compatible with our system.

## 2022-05-16 NOTE — ED PROVIDER NOTE - CARE PLAN
1 Principal Discharge DX:	Pregnant  Secondary Diagnosis:	Vaginal discharge  Secondary Diagnosis:	Herpes simplex

## 2022-05-16 NOTE — ED PROVIDER NOTE - NSFOLLOWUPINSTRUCTIONS_ED_ALL_ED_FT
Pregnancy    WHAT YOU NEED TO KNOW:    A normal pregnancy lasts about 40 weeks. The first trimester lasts from your last period through the 12th week of pregnancy. The second trimester lasts from the 13th week of your pregnancy through the 23rd week. The third trimester lasts from your 24th week of pregnancy until your baby is born. If you know the date of your last period, your healthcare provider can estimate your due date. You may give birth to your baby any time from 37 weeks to 2 weeks after your due date.    DISCHARGE INSTRUCTIONS:    Return to the emergency department if:     You develop a severe headache that does not go away.      You have new or increased vision changes, such as blurred or spotted vision.      You have new or increased swelling in your face or hands.      You have pain or cramping in your abdomen or low back.      You have vaginal bleeding.    Contact your healthcare provider or obstetrician if:     You have abdominal cramps, pressure, or tightening.      You have a change in vaginal discharge.      You cannot keep food or drinks down, and you are losing weight.      You have chills or a fever.      You have vaginal itching, burning, or pain.       You have yellow, green, white, or foul-smelling vaginal discharge.      You have pain or burning when you urinate, less urine than usual, or pink or bloody urine.      You have questions or concerns about your condition or care.    Medicines:     Prenatal vitamins provide some of the extra vitamins and minerals you need during pregnancy. Prenatal vitamins may also help to decrease the risk of certain birth defects.       Take your medicine as directed. Contact your healthcare provider if you think your medicine is not helping or if you have side effects. Tell him or her if you are allergic to any medicine. Keep a list of the medicines, vitamins, and herbs you take. Include the amounts, and when and why you take them. Bring the list or the pill bottles to follow-up visits. Carry your medicine list with you in case of an emergency.    Follow up with your healthcare provider or obstetrician as directed: Go to all of your prenatal visits during your pregnancy. Write down your questions so you remember to ask them during your visits.    Body changes that may occur during your pregnancy:     Breast changes you will experience include tenderness and tingling during the early part of your pregnancy. Your breasts will become larger. You may need to use a support bra. You may see a thin, yellow fluid, called colostrum, leak from your nipples during the second trimester. Colostrum is a liquid that changes to milk about 3 days after you give birth.      Skin changes and stretch marks may occur during your pregnancy. You may have red marks, called stretch marks, on your skin. Stretch marks will usually fade after pregnancy. Use lotion if your skin is dry and itchy. The skin on your face, around your nipples, and below your belly button may darken. Most of the time, your skin will return to its normal color after your baby is born.       Morning sickness is nausea and vomiting that can happen at any time of day. Avoid fatty and spicy foods. Eat small meals throughout the day instead of large meals. Jessie may help to decrease nausea. Ask your healthcare provider about other ways of decreasing nausea and vomiting.      Heartburn may be caused by changes in your hormones during pregnancy. Your growing uterus may also push your stomach upward and force stomach acid to back up into your esophagus. Eat 4 or 5 small meals each day instead of large meals. Avoid spicy foods. Avoid eating right before bedtime.      Constipation may develop during your pregnancy. To treat constipation, eat foods high in fiber such as fiber cereals, beans, fruits, vegetables, whole-grain breads, and prune juice. Get regular exercise and drink plenty of water. Your healthcare provider may also suggest a fiber supplement to soften your bowel movements. Talk to your healthcare provider before you use any medicines to decrease constipation.      Hemorrhoids are enlarged veins in the rectal area. They may cause pain, itching, and bright red bleeding from your rectum. To decrease your risk of hemorrhoids, prevent constipation and do not strain to have a bowel movement. If you have hemorrhoids, soak in a tub of warm water to ease discomfort. Ask your healthcare provider how you can treat hemorrhoids.       Leg cramps and swelling may be caused by low calcium levels or the added weight of pregnancy. Raise your legs above the level of your heart to decrease swelling. During a leg cramp, stretch or massage the muscle that has the cramp. Heat may help decrease pain and muscle spasms. Apply heat on your muscle for 20 to 30 minutes every 2 hours for as many days as directed.      Back pain may occur as your baby grows. Do not stand for long periods of time or lift heavy items. Use good posture while you stand, squat, or bend. Wear low-heeled shoes with good support. Rest may also help to relieve back pain. Ask your healthcare provider about exercises you can do to strengthen your back muscles.     Stay healthy during your pregnancy:     Eat a variety of healthy foods. Healthy foods include fruits, vegetables, whole-grain breads, low-fat dairy foods, beans, lean meats, and fish. Drink liquids as directed. Ask how much liquid to drink each day and which liquids are best for you. Limit caffeine to less than 200 milligrams each day. Limit your intake of fish to 2 servings each week. Choose fish low in mercury such as canned light tuna, shrimp, crab, salmon, cod, or tilapia. Do not eat fish high in mercury such as swordfish, tilefish, ruben mackerel, and shark.       Take prenatal vitamins as directed. Your need for certain vitamins and minerals, such as folic acid, increases during pregnancy. Prenatal vitamins provide some of the extra vitamins and minerals you need. Prenatal vitamins may also help to decrease the risk of certain birth defects.       Ask how much weight you should gain during your pregnancy. Too much or too little weight gain can be unhealthy for you and your baby.       Talk to your healthcare provider about exercise. Moderate exercise can help you stay fit. Your healthcare provider will help you plan an exercise program that is safe for you during pregnancy.       Do not smoke. Smoking increases your risk of a miscarriage and other health problems during your pregnancy. Smoking can cause your baby to be born too early or weigh less at birth. Quit smoking as soon as you think you might be pregnant. Ask your healthcare provider for information if you need help quitting.      Do not drink alcohol. Alcohol passes from your body to your baby through the placenta. It can affect your baby's brain development and cause fetal alcohol syndrome (FAS). FAS is a group of conditions that causes mental, behavior, and growth problems.       Talk to your healthcare provider before you take any medicines. Many medicines may harm your baby if you take them when you are pregnant. Do not take any medicines, vitamins, herbs, or supplements without first talking to your healthcare provider. Never use illegal or street drugs (such as marijuana or cocaine) while you are pregnant.     Safety tips:     Avoid hot tubs and saunas. Do not use a hot tub or sauna while you are pregnant, especially during your first trimester. Hot tubs and saunas may raise your baby's temperature and increase the risk of birth defects.      Avoid toxoplasmosis. This is an infection caused by eating raw meat or being around infected cat feces. It can cause birth defects, miscarriages, and other problems. Wash your hands after you touch raw meat. Make sure any meat is well-cooked before you eat it. Avoid raw eggs and unpasteurized milk. Use gloves or ask someone else to clean your cat's litter box while you are pregnant.       Ask your healthcare provider about travel. The most comfortable time to travel is during the second trimester. Ask your healthcare provider if you can travel after 36 weeks. You may not be able to travel in an airplane after 36 weeks. He may also recommend that you avoid long road trips.           VAGINAL DISCHARGE - AfterCare(R) Instructions(ER/ED)     Vaginal Discharge    WHAT YOU NEED TO KNOW:    Vaginal discharge is normal. It is usually clear or white and odorless. Vaginal discharge is your body's way of cleaning your vagina so it is healthy. Irritation, itching, burning, or a change in the amount, smell, or color may indicate a problem.    DISCHARGE INSTRUCTIONS:    Contact your healthcare provider or gynecologist if:     You have swelling, burning, itching, or irritation in or around your vagina.      You have an increase in the amount of discharge.      The color or smell of your discharge changes.      Your discharge looks similar to cottage cheese.      Your discharge is bloody and it is not your monthly period.      You have pain during sexual intercourse.      You have trouble urinating, or you urinate often and with urgency.      You have abdominal pain or cramps.      You have a fever or chills.      You have low back pain or side pain.      You have questions or concerns about your condition or care.    Keep your vagina healthy:     Always wipe from front to back after you use the toilet. This prevents spreading bacteria from your rectal area into your vagina.       Clean in and around your vagina with mild soap and warm water each day. Gently dry the area after washing. Do not use hot tubs. The heat and moisture from hot tubs can increase your risk for another yeast infection.      Do not wear tight-fitting clothes or undergarments for long periods. Wear cotton underwear during the day. Cotton helps keep your genital area dry and does not hold in warmth or moisture. Do not wear underwear at night.      Change your laundry soap or fabric softener if you think it is irritating your skin.      Do not douche or use feminine hygiene sprays or bubble bath. Do not use pads or tampons that are scented, or colored or perfumed toilet paper.      Ask your healthcare provider about birth control options if necessary. Condoms have latex and diaphragms have gel that kill sperm. Both of these may irritate your genital area.    Follow up with your healthcare provider as directed: Write down your questions so you remember to ask them during your visits.        © Copyright [employer] 2019 All illustrations and images included in CareNotes are the copyrighted property of [employer] or [employer].

## 2022-05-16 NOTE — ED PROVIDER NOTE - OBJECTIVE STATEMENT
27 y/o female presents to the ED c/o "I have foul smelling vaginal discharge for a couple of days. I also have a blister on my lip. I want to get tested for STD. I also have a cough." no fever/ chills/ weakness/ abd pain/ back pain

## 2022-05-16 NOTE — ED PROVIDER NOTE - ATTENDING APP SHARED VISIT CONTRIBUTION OF CARE
I personally evaluated the patient. I reviewed the Resident’s or Physician Assistant’s note (as assigned above), and agree with the findings and plan except as documented in my note.   26-year-old female history of ADHD and prior STIs complaining of vaginal discharge for 2 to 3 days and lip ulcer.  Patient requesting STI testing.  No fever, chills.  Patient is sexually active and without protection.  No rash.  No abdominal pain or back pain.  No dysuria, frequency or urgency.  No nausea, vomiting.  Vitals noted.  CONSTITUTIONAL: Well-appearing; well-nourished; in no apparent distress.   HEAD: Normocephalic; atraumatic.   EYES: PERRL; EOM intact. Conjunctiva normal B/L.   ENT: Normal pharynx with no tonsillar hypertrophy. MMM. + herpetic lesion to upper lip.  NECK: Supple; non-tender; no cervical lymphadenopathy.   CARDIOVASCULAR: Normal S1, S2; no murmurs, rubs, or gallops.   RESPIRATORY: Normal chest excursion with respiration; breath sounds clear and equal bilaterally; no wheezes, rhonchi, or rales.  GI/: Normal bowel sounds; non-distended; non-tender.  BACK: No evidence of trauma or deformity. Non-tender to palpation. No CVA tenderness.   SKIN: Normal for age and race; warm; dry; good turgor.

## 2022-05-17 LAB
C TRACH RRNA SPEC QL NAA+PROBE: SIGNIFICANT CHANGE UP
HIV 1+2 AB+HIV1 P24 AG SERPL QL IA: SIGNIFICANT CHANGE UP
N GONORRHOEA RRNA SPEC QL NAA+PROBE: SIGNIFICANT CHANGE UP
SPECIMEN SOURCE: SIGNIFICANT CHANGE UP

## 2022-05-24 ENCOUNTER — OUTPATIENT (OUTPATIENT)
Dept: OUTPATIENT SERVICES | Facility: HOSPITAL | Age: 27
LOS: 1 days | Discharge: HOME | End: 2022-05-24

## 2022-05-24 ENCOUNTER — LABORATORY RESULT (OUTPATIENT)
Age: 27
End: 2022-05-24

## 2022-05-24 ENCOUNTER — APPOINTMENT (OUTPATIENT)
Dept: OBGYN | Facility: CLINIC | Age: 27
End: 2022-05-24
Payer: MEDICAID

## 2022-05-24 VITALS
OXYGEN SATURATION: 99 % | WEIGHT: 119.31 LBS | BODY MASS INDEX: 21.14 KG/M2 | HEART RATE: 63 BPM | DIASTOLIC BLOOD PRESSURE: 60 MMHG | SYSTOLIC BLOOD PRESSURE: 96 MMHG | HEIGHT: 63 IN

## 2022-05-24 DIAGNOSIS — Z98.890 OTHER SPECIFIED POSTPROCEDURAL STATES: Chronic | ICD-10-CM

## 2022-05-24 DIAGNOSIS — O03.9 COMPLETE OR UNSPECIFIED SPONTANEOUS ABORTION WITHOUT COMPLICATION: Chronic | ICD-10-CM

## 2022-05-24 PROCEDURE — 76857 US EXAM PELVIC LIMITED: CPT | Mod: 26

## 2022-05-24 PROCEDURE — 99214 OFFICE O/P EST MOD 30 MIN: CPT

## 2022-05-24 RX ORDER — IBUPROFEN 800 MG/1
800 TABLET, FILM COATED ORAL EVERY 8 HOURS
Qty: 15 | Refills: 0 | Status: ACTIVE | COMMUNITY
Start: 2022-05-24 | End: 1900-01-01

## 2022-05-24 RX ORDER — DOXYCYCLINE HYCLATE 100 MG/1
100 TABLET ORAL
Qty: 2 | Refills: 0 | Status: ACTIVE | COMMUNITY
Start: 2022-05-24 | End: 1900-01-01

## 2022-05-25 ENCOUNTER — NON-APPOINTMENT (OUTPATIENT)
Age: 27
End: 2022-05-25

## 2022-05-25 ENCOUNTER — APPOINTMENT (OUTPATIENT)
Dept: OBGYN | Facility: CLINIC | Age: 27
End: 2022-05-25

## 2022-05-25 LAB
ABO + RH PNL BLD: NORMAL
BASOPHILS # BLD AUTO: 0.03 K/UL
BASOPHILS NFR BLD AUTO: 0.5 %
BLD GP AB SCN SERPL QL: NORMAL
EOSINOPHIL # BLD AUTO: 0.07 K/UL
EOSINOPHIL NFR BLD AUTO: 1.2 %
HBV SURFACE AG SER QL: NONREACTIVE
HCT VFR BLD CALC: 36.9 %
HGB BLD-MCNC: 11.7 G/DL
IMM GRANULOCYTES NFR BLD AUTO: 0.2 %
LYMPHOCYTES # BLD AUTO: 1.95 K/UL
LYMPHOCYTES NFR BLD AUTO: 33.1 %
MAN DIFF?: NORMAL
MCHC RBC-ENTMCNC: 27.2 PG
MCHC RBC-ENTMCNC: 31.7 G/DL
MCV RBC AUTO: 85.8 FL
MONOCYTES # BLD AUTO: 0.42 K/UL
MONOCYTES NFR BLD AUTO: 7.1 %
NEUTROPHILS # BLD AUTO: 3.42 K/UL
NEUTROPHILS NFR BLD AUTO: 57.9 %
PLATELET # BLD AUTO: 317 K/UL
RBC # BLD: 4.3 M/UL
RBC # FLD: 13.1 %
T PALLIDUM AB SER QL IA: NEGATIVE
WBC # FLD AUTO: 5.9 K/UL

## 2022-05-25 NOTE — PHYSICAL EXAM
[Chaperone Present] : A chaperone was present in the examining room during all aspects of the physical examination [Appropriately responsive] : appropriately responsive [Alert] : alert [No Acute Distress] : no acute distress [Soft] : soft [Non-tender] : non-tender [No Lesions] : no lesions [Oriented x3] : oriented x3 [FreeTextEntry1] : Lyndsey KANG MA  [FreeTextEntry5] : no increased work of breathing

## 2022-05-25 NOTE — HISTORY OF PRESENT ILLNESS
[FreeTextEntry1] : Patient is a 25yo  LMP 22 @ 7+1 by LMP presenting today for termination of pregnancy. \par \par Patient has note yet had an ultrasound during this pregnancy. \par \par She endorses she is firm in her decision for termination of pregnancy. She discussed the decision with her partner (father of her daughter) and endorses having good support, reports her family will by her support during this process. She denies being pressured to make this decision. \par \par ObhHx:  delivery x1, induced  x7 via D&C \par Complications: Denies \par GynHx: Endorses STIs (GC, CT, Syphillis). reports lip/oral lesion that comes and goes \par Primary Gyn Dr. Govea\par \par PMH: ADHD, daily marijuana smoking \par PSH: D&C x7\par \par Meds: denies\par Allergies: seasonal, NKDA \par \par SocHx: Works as a  in schools, lives with daughter (Michelle) and partner, feels safe at home. Denies tobacco, drinks ETOH socially \par \par Ultrasound TA/TV: \par Gestational sac: PRESENT, in fundal region \par Yolk Sac: PRESENT\par Embryo: not visualized \par \par

## 2022-05-25 NOTE — PLAN
[FreeTextEntry1] :  #ASSESSMENT \par \par Patient is a 27yo  at 7 weeks by LMP who presents for pre-op evaluation for pregnancy termination.   \par \par Options Counseling: The patient was counseled about her pregnancy options of parenthood, adoption and . She expressed her firm decision for pregnancy termination. \par \par Risks and benefits of the procedure were explained, including risk of bleeding, infection, uterine perforation and damage to nearby structures, cervical laceration, retained products of conception and need for re-aspiration.   \par \par Patient scheduled for D and C procedure.  \par She was given doxycycline 200 mg PO to take at home with dinner tonight to take the night before the procedure and will receive an additional 100 mg PO in recovery after her procedure. \par Rx for 800mg Ibuprofen sent to pharmacy  \par Patient understands COVID testing required within 72 hours at [address] or else procedure may be delayed or canceled. \par \par T&S, CBC: Ordered, most recent Hg 10 \par STI screen: Patient offered STI screening, patient accepting of vaginitis today, serology ordered, HSV ordered per pt request  \par \par We reviewed preop and postoperative instructions with her, including NPO instructions. \par \par Reproductive life goals: Patient is interesting preventing pregnancy, reports she has not used a method in the past

## 2022-05-25 NOTE — COUNSELING
[Contraception/ Emergency Contraception/ Safe Sexual Practices] : contraception, emergency contraception, safe sexual practices [Pregnancy Options] : pregnancy options [STD (testing, results, tx)] : STD (testing, results, tx) [Lab Results] : lab results

## 2022-05-26 DIAGNOSIS — Z01.818 ENCOUNTER FOR OTHER PREPROCEDURAL EXAMINATION: ICD-10-CM

## 2022-05-26 DIAGNOSIS — Z30.09 ENCOUNTER FOR OTHER GENERAL COUNSELING AND ADVICE ON CONTRACEPTION: ICD-10-CM

## 2022-05-26 DIAGNOSIS — Z70.8 OTHER SEX COUNSELING: ICD-10-CM

## 2022-05-26 DIAGNOSIS — Z64.0 PROBLEMS RELATED TO UNWANTED PREGNANCY: ICD-10-CM

## 2022-05-27 LAB
A VAGINAE DNA VAG QL NAA+PROBE: SIGNIFICANT CHANGE UP
BVAB2 DNA VAG QL NAA+PROBE: ABNORMAL
C ALBICANS DNA VAG QL NAA+PROBE: NEGATIVE — SIGNIFICANT CHANGE UP
C GLABRATA DNA VAG QL NAA+PROBE: NEGATIVE — SIGNIFICANT CHANGE UP
C KRUSEI DNA VAG QL NAA+PROBE: NEGATIVE — SIGNIFICANT CHANGE UP
C LUSITANIAE DNA VAG QL NAA+PROBE: NEGATIVE — SIGNIFICANT CHANGE UP
C TRACH RRNA SPEC QL NAA+PROBE: NEGATIVE — SIGNIFICANT CHANGE UP
MEGA1 DNA VAG QL NAA+PROBE: ABNORMAL
N GONORRHOEA RRNA SPEC QL NAA+PROBE: NEGATIVE — SIGNIFICANT CHANGE UP
T VAGINALIS RRNA SPEC QL NAA+PROBE: NEGATIVE — SIGNIFICANT CHANGE UP

## 2022-05-28 ENCOUNTER — EMERGENCY (EMERGENCY)
Facility: HOSPITAL | Age: 27
LOS: 0 days | Discharge: HOME | End: 2022-05-29
Attending: EMERGENCY MEDICINE | Admitting: EMERGENCY MEDICINE
Payer: MEDICAID

## 2022-05-28 VITALS
DIASTOLIC BLOOD PRESSURE: 56 MMHG | TEMPERATURE: 97 F | HEIGHT: 64 IN | SYSTOLIC BLOOD PRESSURE: 120 MMHG | RESPIRATION RATE: 18 BRPM | WEIGHT: 110.01 LBS | HEART RATE: 72 BPM | OXYGEN SATURATION: 98 %

## 2022-05-28 DIAGNOSIS — Z98.890 OTHER SPECIFIED POSTPROCEDURAL STATES: Chronic | ICD-10-CM

## 2022-05-28 DIAGNOSIS — N83.12 CORPUS LUTEUM CYST OF LEFT OVARY: ICD-10-CM

## 2022-05-28 DIAGNOSIS — Z86.19 PERSONAL HISTORY OF OTHER INFECTIOUS AND PARASITIC DISEASES: ICD-10-CM

## 2022-05-28 DIAGNOSIS — O03.9 COMPLETE OR UNSPECIFIED SPONTANEOUS ABORTION WITHOUT COMPLICATION: Chronic | ICD-10-CM

## 2022-05-28 DIAGNOSIS — O21.9 VOMITING OF PREGNANCY, UNSPECIFIED: ICD-10-CM

## 2022-05-28 DIAGNOSIS — O99.891 OTHER SPECIFIED DISEASES AND CONDITIONS COMPLICATING PREGNANCY: ICD-10-CM

## 2022-05-28 DIAGNOSIS — O34.81 MATERNAL CARE FOR OTHER ABNORMALITIES OF PELVIC ORGANS, FIRST TRIMESTER: ICD-10-CM

## 2022-05-28 DIAGNOSIS — Z3A.01 LESS THAN 8 WEEKS GESTATION OF PREGNANCY: ICD-10-CM

## 2022-05-28 DIAGNOSIS — R10.9 UNSPECIFIED ABDOMINAL PAIN: ICD-10-CM

## 2022-05-28 PROCEDURE — 99285 EMERGENCY DEPT VISIT HI MDM: CPT

## 2022-05-29 LAB
ALBUMIN SERPL ELPH-MCNC: 4.1 G/DL — SIGNIFICANT CHANGE UP (ref 3.5–5.2)
ALP SERPL-CCNC: 34 U/L — SIGNIFICANT CHANGE UP (ref 30–115)
ALT FLD-CCNC: 8 U/L — SIGNIFICANT CHANGE UP (ref 0–41)
ANION GAP SERPL CALC-SCNC: 11 MMOL/L — SIGNIFICANT CHANGE UP (ref 7–14)
APPEARANCE UR: CLEAR — SIGNIFICANT CHANGE UP
AST SERPL-CCNC: 13 U/L — SIGNIFICANT CHANGE UP (ref 0–41)
BACTERIA # UR AUTO: NEGATIVE — SIGNIFICANT CHANGE UP
BASOPHILS # BLD AUTO: 0.04 K/UL — SIGNIFICANT CHANGE UP (ref 0–0.2)
BASOPHILS NFR BLD AUTO: 0.6 % — SIGNIFICANT CHANGE UP (ref 0–1)
BILIRUB SERPL-MCNC: 0.4 MG/DL — SIGNIFICANT CHANGE UP (ref 0.2–1.2)
BILIRUB UR-MCNC: NEGATIVE — SIGNIFICANT CHANGE UP
BLD GP AB SCN SERPL QL: SIGNIFICANT CHANGE UP
BUN SERPL-MCNC: 11 MG/DL — SIGNIFICANT CHANGE UP (ref 10–20)
CALCIUM SERPL-MCNC: 8.9 MG/DL — SIGNIFICANT CHANGE UP (ref 8.5–10.1)
CHLORIDE SERPL-SCNC: 99 MMOL/L — SIGNIFICANT CHANGE UP (ref 98–110)
CO2 SERPL-SCNC: 22 MMOL/L — SIGNIFICANT CHANGE UP (ref 17–32)
COLOR SPEC: YELLOW — SIGNIFICANT CHANGE UP
CREAT SERPL-MCNC: 0.6 MG/DL — LOW (ref 0.7–1.5)
DIFF PNL FLD: NEGATIVE — SIGNIFICANT CHANGE UP
EGFR: 126 ML/MIN/1.73M2 — SIGNIFICANT CHANGE UP
EOSINOPHIL # BLD AUTO: 0.06 K/UL — SIGNIFICANT CHANGE UP (ref 0–0.7)
EOSINOPHIL NFR BLD AUTO: 0.8 % — SIGNIFICANT CHANGE UP (ref 0–8)
EPI CELLS # UR: 12 /HPF — HIGH (ref 0–5)
GLUCOSE SERPL-MCNC: 80 MG/DL — SIGNIFICANT CHANGE UP (ref 70–99)
GLUCOSE UR QL: NEGATIVE — SIGNIFICANT CHANGE UP
HCG SERPL-ACNC: HIGH MIU/ML
HCT VFR BLD CALC: 36.2 % — LOW (ref 37–47)
HGB BLD-MCNC: 11.8 G/DL — LOW (ref 12–16)
HYALINE CASTS # UR AUTO: 10 /LPF — HIGH (ref 0–7)
IMM GRANULOCYTES NFR BLD AUTO: 0.1 % — SIGNIFICANT CHANGE UP (ref 0.1–0.3)
KETONES UR-MCNC: ABNORMAL
LACTATE SERPL-SCNC: 0.7 MMOL/L — SIGNIFICANT CHANGE UP (ref 0.7–2)
LEUKOCYTE ESTERASE UR-ACNC: NEGATIVE — SIGNIFICANT CHANGE UP
LIDOCAIN IGE QN: 17 U/L — SIGNIFICANT CHANGE UP (ref 7–60)
LYMPHOCYTES # BLD AUTO: 2.6 K/UL — SIGNIFICANT CHANGE UP (ref 1.2–3.4)
LYMPHOCYTES # BLD AUTO: 36.3 % — SIGNIFICANT CHANGE UP (ref 20.5–51.1)
MCHC RBC-ENTMCNC: 27.4 PG — SIGNIFICANT CHANGE UP (ref 27–31)
MCHC RBC-ENTMCNC: 32.6 G/DL — SIGNIFICANT CHANGE UP (ref 32–37)
MCV RBC AUTO: 84 FL — SIGNIFICANT CHANGE UP (ref 81–99)
MONOCYTES # BLD AUTO: 0.52 K/UL — SIGNIFICANT CHANGE UP (ref 0.1–0.6)
MONOCYTES NFR BLD AUTO: 7.3 % — SIGNIFICANT CHANGE UP (ref 1.7–9.3)
NEUTROPHILS # BLD AUTO: 3.94 K/UL — SIGNIFICANT CHANGE UP (ref 1.4–6.5)
NEUTROPHILS NFR BLD AUTO: 54.9 % — SIGNIFICANT CHANGE UP (ref 42.2–75.2)
NITRITE UR-MCNC: NEGATIVE — SIGNIFICANT CHANGE UP
NRBC # BLD: 0 /100 WBCS — SIGNIFICANT CHANGE UP (ref 0–0)
PH UR: 6.5 — SIGNIFICANT CHANGE UP (ref 5–8)
PLATELET # BLD AUTO: 283 K/UL — SIGNIFICANT CHANGE UP (ref 130–400)
POTASSIUM SERPL-MCNC: 3.6 MMOL/L — SIGNIFICANT CHANGE UP (ref 3.5–5)
POTASSIUM SERPL-SCNC: 3.6 MMOL/L — SIGNIFICANT CHANGE UP (ref 3.5–5)
PROT SERPL-MCNC: 6.8 G/DL — SIGNIFICANT CHANGE UP (ref 6–8)
PROT UR-MCNC: ABNORMAL
RBC # BLD: 4.31 M/UL — SIGNIFICANT CHANGE UP (ref 4.2–5.4)
RBC # FLD: 13.1 % — SIGNIFICANT CHANGE UP (ref 11.5–14.5)
RBC CASTS # UR COMP ASSIST: 2 /HPF — SIGNIFICANT CHANGE UP (ref 0–4)
SODIUM SERPL-SCNC: 132 MMOL/L — LOW (ref 135–146)
SP GR SPEC: 1.04 — HIGH (ref 1.01–1.03)
UROBILINOGEN FLD QL: ABNORMAL
WBC # BLD: 7.17 K/UL — SIGNIFICANT CHANGE UP (ref 4.8–10.8)
WBC # FLD AUTO: 7.17 K/UL — SIGNIFICANT CHANGE UP (ref 4.8–10.8)
WBC UR QL: 2 /HPF — SIGNIFICANT CHANGE UP (ref 0–5)

## 2022-05-29 PROCEDURE — 76830 TRANSVAGINAL US NON-OB: CPT | Mod: 26

## 2022-05-29 RX ORDER — ONDANSETRON 8 MG/1
4 TABLET, FILM COATED ORAL ONCE
Refills: 0 | Status: COMPLETED | OUTPATIENT
Start: 2022-05-29 | End: 2022-05-29

## 2022-05-29 RX ORDER — ACETAMINOPHEN 500 MG
650 TABLET ORAL ONCE
Refills: 0 | Status: COMPLETED | OUTPATIENT
Start: 2022-05-29 | End: 2022-05-29

## 2022-05-29 RX ORDER — SODIUM CHLORIDE 9 MG/ML
1000 INJECTION INTRAMUSCULAR; INTRAVENOUS; SUBCUTANEOUS ONCE
Refills: 0 | Status: COMPLETED | OUTPATIENT
Start: 2022-05-29 | End: 2022-05-29

## 2022-05-29 RX ORDER — DOXYLAMINE SUCCINATE AND PYRIDOXINE HYDROCHLORIDE, DELAYED RELEASE TABLETS 10 MG/10 MG 10; 10 MG/1; MG/1
2 TABLET, DELAYED RELEASE ORAL
Qty: 28 | Refills: 0
Start: 2022-05-29 | End: 2022-06-11

## 2022-05-29 RX ADMIN — ONDANSETRON 4 MILLIGRAM(S): 8 TABLET, FILM COATED ORAL at 03:04

## 2022-05-29 RX ADMIN — Medication 650 MILLIGRAM(S): at 03:04

## 2022-05-29 RX ADMIN — SODIUM CHLORIDE 1000 MILLILITER(S): 9 INJECTION INTRAMUSCULAR; INTRAVENOUS; SUBCUTANEOUS at 06:38

## 2022-05-29 NOTE — ED PROVIDER NOTE - NSFOLLOWUPINSTRUCTIONS_ED_ALL_ED_FT
Acute Nausea and Vomiting    WHAT YOU NEED TO KNOW:    Acute nausea and vomiting start suddenly, worsen quickly, and last a short time.    DISCHARGE INSTRUCTIONS:    Return to the emergency department if:     You see blood in your vomit or your bowel movements.      You have sudden, severe pain in your chest and upper abdomen after hard vomiting or retching.      You have swelling in your neck and chest.       You are dizzy, cold, and thirsty and your eyes and mouth are dry.      You are urinating very little or not at all.      You have muscle weakness, leg cramps, and trouble breathing.       Your heart is beating much faster than normal.       You continue to vomit for more than 48 hours.     Contact your healthcare provider if:     You have frequent dry heaves (vomiting but nothing comes out).      Your nausea and vomiting does not get better or go away after you use medicine.      You have questions or concerns about your condition or treatment.    Medicines: You may need any of the following:     Medicines may be given to calm your stomach and stop your vomiting. You may also need medicines to help you feel more relaxed or to stop nausea and vomiting caused by motion sickness.      Gastrointestinal stimulants are used to help empty your stomach and bowels. This may help decrease nausea and vomiting.      Take your medicine as directed. Contact your healthcare provider if you think your medicine is not helping or if you have side effects. Tell him or her if you are allergic to any medicine. Keep a list of the medicines, vitamins, and herbs you take. Include the amounts, and when and why you take them. Bring the list or the pill bottles to follow-up visits. Carry your medicine list with you in case of an emergency.    Prevent or manage acute nausea and vomiting:     Do not drink alcohol. Alcohol may upset or irritate your stomach. Too much alcohol can also cause acute nausea and vomiting.      Control stress. Headaches due to stress may cause nausea and vomiting. Find ways to relax and manage your stress. Get more rest and sleep.      Drink more liquids as directed. Vomiting can lead to dehydration. It is important to drink more liquids to help replace lost body fluids. Ask your healthcare provider how much liquid to drink each day and which liquids are best for you. Your provider may recommend that you drink an oral rehydration solution (ORS). ORS contains water, salts, and sugar that are needed to replace the lost body fluids. Ask what kind of ORS to use, how much to drink, and where to get it.      Eat smaller meals, more often. Eat small amounts of food every 2 to 3 hours, even if you are not hungry. Food in your stomach may decrease your nausea.      Talk to your healthcare provider before you take over-the-counter (OTC) medicines. These medicines can cause serious problems if you use certain other medicines, or you have a medical condition. You may have problems if you use too much or use them for longer than the label says. Follow directions on the label carefully.     Follow up with your healthcare provider as directed: Write down your questions so you remember to ask them during your follow-up visits.       © Copyright [employer] 2019 All illustrations and images included in CareNotes are the copyrighted property of [employer] or [employer].        Pregnancy    WHAT YOU NEED TO KNOW:    A normal pregnancy lasts about 40 weeks. The first trimester lasts from your last period through the 12th week of pregnancy. The second trimester lasts from the 13th week of your pregnancy through the 23rd week. The third trimester lasts from your 24th week of pregnancy until your baby is born. If you know the date of your last period, your healthcare provider can estimate your due date. You may give birth to your baby any time from 37 weeks to 2 weeks after your due date.    DISCHARGE INSTRUCTIONS:    Return to the emergency department if:     You develop a severe headache that does not go away.      You have new or increased vision changes, such as blurred or spotted vision.      You have new or increased swelling in your face or hands.      You have pain or cramping in your abdomen or low back.      You have vaginal bleeding.    Contact your healthcare provider or obstetrician if:     You have abdominal cramps, pressure, or tightening.      You have a change in vaginal discharge.      You cannot keep food or drinks down, and you are losing weight.      You have chills or a fever.      You have vaginal itching, burning, or pain.       You have yellow, green, white, or foul-smelling vaginal discharge.      You have pain or burning when you urinate, less urine than usual, or pink or bloody urine.      You have questions or concerns about your condition or care.    Medicines:     Prenatal vitamins provide some of the extra vitamins and minerals you need during pregnancy. Prenatal vitamins may also help to decrease the risk of certain birth defects.       Take your medicine as directed. Contact your healthcare provider if you think your medicine is not helping or if you have side effects. Tell him or her if you are allergic to any medicine. Keep a list of the medicines, vitamins, and herbs you take. Include the amounts, and when and why you take them. Bring the list or the pill bottles to follow-up visits. Carry your medicine list with you in case of an emergency.    Follow up with your healthcare provider or obstetrician as directed: Go to all of your prenatal visits during your pregnancy. Write down your questions so you remember to ask them during your visits.    Body changes that may occur during your pregnancy:     Breast changes you will experience include tenderness and tingling during the early part of your pregnancy. Your breasts will become larger. You may need to use a support bra. You may see a thin, yellow fluid, called colostrum, leak from your nipples during the second trimester. Colostrum is a liquid that changes to milk about 3 days after you give birth.      Skin changes and stretch marks may occur during your pregnancy. You may have red marks, called stretch marks, on your skin. Stretch marks will usually fade after pregnancy. Use lotion if your skin is dry and itchy. The skin on your face, around your nipples, and below your belly button may darken. Most of the time, your skin will return to its normal color after your baby is born.       Morning sickness is nausea and vomiting that can happen at any time of day. Avoid fatty and spicy foods. Eat small meals throughout the day instead of large meals. Jessie may help to decrease nausea. Ask your healthcare provider about other ways of decreasing nausea and vomiting.      Heartburn may be caused by changes in your hormones during pregnancy. Your growing uterus may also push your stomach upward and force stomach acid to back up into your esophagus. Eat 4 or 5 small meals each day instead of large meals. Avoid spicy foods. Avoid eating right before bedtime.      Constipation may develop during your pregnancy. To treat constipation, eat foods high in fiber such as fiber cereals, beans, fruits, vegetables, whole-grain breads, and prune juice. Get regular exercise and drink plenty of water. Your healthcare provider may also suggest a fiber supplement to soften your bowel movements. Talk to your healthcare provider before you use any medicines to decrease constipation.      Hemorrhoids are enlarged veins in the rectal area. They may cause pain, itching, and bright red bleeding from your rectum. To decrease your risk of hemorrhoids, prevent constipation and do not strain to have a bowel movement. If you have hemorrhoids, soak in a tub of warm water to ease discomfort. Ask your healthcare provider how you can treat hemorrhoids.       Leg cramps and swelling may be caused by low calcium levels or the added weight of pregnancy. Raise your legs above the level of your heart to decrease swelling. During a leg cramp, stretch or massage the muscle that has the cramp. Heat may help decrease pain and muscle spasms. Apply heat on your muscle for 20 to 30 minutes every 2 hours for as many days as directed.      Back pain may occur as your baby grows. Do not stand for long periods of time or lift heavy items. Use good posture while you stand, squat, or bend. Wear low-heeled shoes with good support. Rest may also help to relieve back pain. Ask your healthcare provider about exercises you can do to strengthen your back muscles.     Stay healthy during your pregnancy:     Eat a variety of healthy foods. Healthy foods include fruits, vegetables, whole-grain breads, low-fat dairy foods, beans, lean meats, and fish. Drink liquids as directed. Ask how much liquid to drink each day and which liquids are best for you. Limit caffeine to less than 200 milligrams each day. Limit your intake of fish to 2 servings each week. Choose fish low in mercury such as canned light tuna, shrimp, crab, salmon, cod, or tilapia. Do not eat fish high in mercury such as swordfish, tilefish, ruben mackerel, and shark.       Take prenatal vitamins as directed. Your need for certain vitamins and minerals, such as folic acid, increases during pregnancy. Prenatal vitamins provide some of the extra vitamins and minerals you need. Prenatal vitamins may also help to decrease the risk of certain birth defects.       Ask how much weight you should gain during your pregnancy. Too much or too little weight gain can be unhealthy for you and your baby.       Talk to your healthcare provider about exercise. Moderate exercise can help you stay fit. Your healthcare provider will help you plan an exercise program that is safe for you during pregnancy.       Do not smoke. Smoking increases your risk of a miscarriage and other health problems during your pregnancy. Smoking can cause your baby to be born too early or weigh less at birth. Quit smoking as soon as you think you might be pregnant. Ask your healthcare provider for information if you need help quitting.      Do not drink alcohol. Alcohol passes from your body to your baby through the placenta. It can affect your baby's brain development and cause fetal alcohol syndrome (FAS). FAS is a group of conditions that causes mental, behavior, and growth problems.       Talk to your healthcare provider before you take any medicines. Many medicines may harm your baby if you take them when you are pregnant. Do not take any medicines, vitamins, herbs, or supplements without first talking to your healthcare provider. Never use illegal or street drugs (such as marijuana or cocaine) while you are pregnant.     Safety tips:     Avoid hot tubs and saunas. Do not use a hot tub or sauna while you are pregnant, especially during your first trimester. Hot tubs and saunas may raise your baby's temperature and increase the risk of birth defects.      Avoid toxoplasmosis. This is an infection caused by eating raw meat or being around infected cat feces. It can cause birth defects, miscarriages, and other problems. Wash your hands after you touch raw meat. Make sure any meat is well-cooked before you eat it. Avoid raw eggs and unpasteurized milk. Use gloves or ask someone else to clean your cat's litter box while you are pregnant.       Ask your healthcare provider about travel. The most comfortable time to travel is during the second trimester. Ask your healthcare provider if you can travel after 36 weeks. You may not be able to travel in an airplane after 36 weeks. He may also recommend that you avoid long road trips.          © Copyright [employer] 2019 All illustrations and images included in CareNotes are the copyrighted property of [employer] or [employer]. Acute Nausea and Vomiting    WHAT YOU NEED TO KNOW:    Acute nausea and vomiting start suddenly, worsen quickly, and last a short time.    DISCHARGE INSTRUCTIONS:    Return to the emergency department if:     You see blood in your vomit or your bowel movements.      You have sudden, severe pain in your chest and upper abdomen after hard vomiting or retching.      You have swelling in your neck and chest.       You are dizzy, cold, and thirsty and your eyes and mouth are dry.      You are urinating very little or not at all.      You have muscle weakness, leg cramps, and trouble breathing.       Your heart is beating much faster than normal.       You continue to vomit for more than 48 hours.     Contact your healthcare provider if:     You have frequent dry heaves (vomiting but nothing comes out).      Your nausea and vomiting does not get better or go away after you use medicine.      You have questions or concerns about your condition or treatment.    Medicines: You may need any of the following:     Medicines may be given to calm your stomach and stop your vomiting. You may also need medicines to help you feel more relaxed or to stop nausea and vomiting caused by motion sickness.      Gastrointestinal stimulants are used to help empty your stomach and bowels. This may help decrease nausea and vomiting.      Take your medicine as directed. Contact your healthcare provider if you think your medicine is not helping or if you have side effects. Tell him or her if you are allergic to any medicine. Keep a list of the medicines, vitamins, and herbs you take. Include the amounts, and when and why you take them. Bring the list or the pill bottles to follow-up visits. Carry your medicine list with you in case of an emergency.    Prevent or manage acute nausea and vomiting:     Do not drink alcohol. Alcohol may upset or irritate your stomach. Too much alcohol can also cause acute nausea and vomiting.      Control stress. Headaches due to stress may cause nausea and vomiting. Find ways to relax and manage your stress. Get more rest and sleep.      Drink more liquids as directed. Vomiting can lead to dehydration. It is important to drink more liquids to help replace lost body fluids. Ask your healthcare provider how much liquid to drink each day and which liquids are best for you. Your provider may recommend that you drink an oral rehydration solution (ORS). ORS contains water, salts, and sugar that are needed to replace the lost body fluids. Ask what kind of ORS to use, how much to drink, and where to get it.      Eat smaller meals, more often. Eat small amounts of food every 2 to 3 hours, even if you are not hungry. Food in your stomach may decrease your nausea.      Talk to your healthcare provider before you take over-the-counter (OTC) medicines. These medicines can cause serious problems if you use certain other medicines, or you have a medical condition. You may have problems if you use too much or use them for longer than the label says. Follow directions on the label carefully.     Follow up with your healthcare provider as directed: Write down your questions so you remember to ask them during your follow-up visits.       © Copyright [employer] 2019 All illustrations and images included in CareNotes are the copyrighted property of [employer] or [employer].        Pregnancy    WHAT YOU NEED TO KNOW:    A normal pregnancy lasts about 40 weeks. The first trimester lasts from your last period through the 12th week of pregnancy. The second trimester lasts from the 13th week of your pregnancy through the 23rd week. The third trimester lasts from your 24th week of pregnancy until your baby is born. If you know the date of your last period, your healthcare provider can estimate your due date. You may give birth to your baby any time from 37 weeks to 2 weeks after your due date.    DISCHARGE INSTRUCTIONS:    Return to the emergency department if:     You develop a severe headache that does not go away.      You have new or increased vision changes, such as blurred or spotted vision.      You have new or increased swelling in your face or hands.      You have pain or cramping in your abdomen or low back.      You have vaginal bleeding.    Contact your healthcare provider or obstetrician if:     You have abdominal cramps, pressure, or tightening.      You have a change in vaginal discharge.      You cannot keep food or drinks down, and you are losing weight.      You have chills or a fever.      You have vaginal itching, burning, or pain.       You have yellow, green, white, or foul-smelling vaginal discharge.      You have pain or burning when you urinate, less urine than usual, or pink or bloody urine.      You have questions or concerns about your condition or care.    Medicines:     Prenatal vitamins provide some of the extra vitamins and minerals you need during pregnancy. Prenatal vitamins may also help to decrease the risk of certain birth defects.       Take your medicine as directed. Contact your healthcare provider if you think your medicine is not helping or if you have side effects. Tell him or her if you are allergic to any medicine. Keep a list of the medicines, vitamins, and herbs you take. Include the amounts, and when and why you take them. Bring the list or the pill bottles to follow-up visits. Carry your medicine list with you in case of an emergency.    Follow up with your healthcare provider or obstetrician as directed: Go to all of your prenatal visits during your pregnancy. Write down your questions so you remember to ask them during your visits.    Body changes that may occur during your pregnancy:     Breast changes you will experience include tenderness and tingling during the early part of your pregnancy. Your breasts will become larger. You may need to use a support bra. You may see a thin, yellow fluid, called colostrum, leak from your nipples during the second trimester. Colostrum is a liquid that changes to milk about 3 days after you give birth.      Skin changes and stretch marks may occur during your pregnancy. You may have red marks, called stretch marks, on your skin. Stretch marks will usually fade after pregnancy. Use lotion if your skin is dry and itchy. The skin on your face, around your nipples, and below your belly button may darken. Most of the time, your skin will return to its normal color after your baby is born.       Morning sickness is nausea and vomiting that can happen at any time of day. Avoid fatty and spicy foods. Eat small meals throughout the day instead of large meals. Jessie may help to decrease nausea. Ask your healthcare provider about other ways of decreasing nausea and vomiting.      Heartburn may be caused by changes in your hormones during pregnancy. Your growing uterus may also push your stomach upward and force stomach acid to back up into your esophagus. Eat 4 or 5 small meals each day instead of large meals. Avoid spicy foods. Avoid eating right before bedtime.      Constipation may develop during your pregnancy. To treat constipation, eat foods high in fiber such as fiber cereals, beans, fruits, vegetables, whole-grain breads, and prune juice. Get regular exercise and drink plenty of water. Your healthcare provider may also suggest a fiber supplement to soften your bowel movements. Talk to your healthcare provider before you use any medicines to decrease constipation.      Hemorrhoids are enlarged veins in the rectal area. They may cause pain, itching, and bright red bleeding from your rectum. To decrease your risk of hemorrhoids, prevent constipation and do not strain to have a bowel movement. If you have hemorrhoids, soak in a tub of warm water to ease discomfort. Ask your healthcare provider how you can treat hemorrhoids.       Leg cramps and swelling may be caused by low calcium levels or the added weight of pregnancy. Raise your legs above the level of your heart to decrease swelling. During a leg cramp, stretch or massage the muscle that has the cramp. Heat may help decrease pain and muscle spasms. Apply heat on your muscle for 20 to 30 minutes every 2 hours for as many days as directed.      Back pain may occur as your baby grows. Do not stand for long periods of time or lift heavy items. Use good posture while you stand, squat, or bend. Wear low-heeled shoes with good support. Rest may also help to relieve back pain. Ask your healthcare provider about exercises you can do to strengthen your back muscles.     Stay healthy during your pregnancy:     Eat a variety of healthy foods. Healthy foods include fruits, vegetables, whole-grain breads, low-fat dairy foods, beans, lean meats, and fish. Drink liquids as directed. Ask how much liquid to drink each day and which liquids are best for you. Limit caffeine to less than 200 milligrams each day. Limit your intake of fish to 2 servings each week. Choose fish low in mercury such as canned light tuna, shrimp, crab, salmon, cod, or tilapia. Do not eat fish high in mercury such as swordfish, tilefish, ruben mackerel, and shark.       Take prenatal vitamins as directed. Your need for certain vitamins and minerals, such as folic acid, increases during pregnancy. Prenatal vitamins provide some of the extra vitamins and minerals you need. Prenatal vitamins may also help to decrease the risk of certain birth defects.       Ask how much weight you should gain during your pregnancy. Too much or too little weight gain can be unhealthy for you and your baby.       Talk to your healthcare provider about exercise. Moderate exercise can help you stay fit. Your healthcare provider will help you plan an exercise program that is safe for you during pregnancy.       Do not smoke. Smoking increases your risk of a miscarriage and other health problems during your pregnancy. Smoking can cause your baby to be born too early or weigh less at birth. Quit smoking as soon as you think you might be pregnant. Ask your healthcare provider for information if you need help quitting.      Do not drink alcohol. Alcohol passes from your body to your baby through the placenta. It can affect your baby's brain development and cause fetal alcohol syndrome (FAS). FAS is a group of conditions that causes mental, behavior, and growth problems.       Talk to your healthcare provider before you take any medicines. Many medicines may harm your baby if you take them when you are pregnant. Do not take any medicines, vitamins, herbs, or supplements without first talking to your healthcare provider. Never use illegal or street drugs (such as marijuana or cocaine) while you are pregnant.     Safety tips:     Avoid hot tubs and saunas. Do not use a hot tub or sauna while you are pregnant, especially during your first trimester. Hot tubs and saunas may raise your baby's temperature and increase the risk of birth defects.      Avoid toxoplasmosis. This is an infection caused by eating raw meat or being around infected cat feces. It can cause birth defects, miscarriages, and other problems. Wash your hands after you touch raw meat. Make sure any meat is well-cooked before you eat it. Avoid raw eggs and unpasteurized milk. Use gloves or ask someone else to clean your cat's litter box while you are pregnant.       Ask your healthcare provider about travel. The most comfortable time to travel is during the second trimester. Ask your healthcare provider if you can travel after 36 weeks. You may not be able to travel in an airplane after 36 weeks. He may also recommend that you avoid long road trips.          © Copyright [employer] 2019 All illustrations and images included in CareNotes are the copyrighted property of [employer] or [employer].    Ovarian Cyst    An ovarian cyst is a fluid-filled sac on an ovary. The ovaries are organs that make eggs in women. Most ovarian cysts go away on their own and are not cancerous (are benign). Some cysts need treatment.      Follow these instructions at home:    Take over-the-counter and prescription medicines only as told by your doctor.  Do not drive or use heavy machinery while taking prescription pain medicine.  Get pelvic exams and Pap tests as often as told by your doctor.  Return to your normal activities as told by your doctor. Ask your doctor what activities are safe for you.  Do not use any products that contain nicotine or tobacco, such as cigarettes and e-cigarettes. If you need help quitting, ask your doctor.  Keep all follow-up visits as told by your doctor. This is important.      Contact a doctor if:    Your periods are:  Late.  Irregular.  Painful.  Your periods stop.  You have pelvic pain that does not go away.  You have pressure on your bladder.  You have trouble making your bladder empty when you pee (urinate).  You have pain during sex.  You have any of the following in your belly (abdomen):  A feeling of fullness.  Pressure.  Discomfort.  Pain that does not go away.  Swelling.  You feel sick most of the time.  You have trouble pooping (have constipation).  You are not as hungry as usual (you lose your appetite).  You get very bad acne.  You start to have more hair on your body and face.  You are gaining weight or losing weight without changing your exercise and eating habits.  You think you may be pregnant.      Get help right away if:    You have belly pain that is very bad or gets worse.  You cannot eat or drink without throwing up (vomiting).  You suddenly get a fever.  Your period is a lot heavier than usual.  This information is not intended to replace advice given to you by your health care provider. Make sure you discuss any questions you have with your health care provider.

## 2022-05-29 NOTE — ED PROVIDER NOTE - ATTENDING CONTRIBUTION TO CARE
26-year-old female  9 para 1 previous abortions, seen in the emergency department on May 16 for treatment of concern of STIs, presents for left lower pelvic pain, sharp, intermittent, nonradiating, no alleviating or precipitating factors, mild in intensity, associated with nausea, vomiting nonbilious nonbloody for the past 3 weeks.  Patient reports that she recently had an ultrasound done with her OB/GYN a few days ago and was told that she was pregnant ( too early for FHR but was told IUP).  Last menstrual period was between  and .  Past STI history of chlamydia, gonorrhea, and syphilis for which she was treated for.  Patient reports she does not want to keep this pregnancy and let her OB/GYN know.  No fever, chills, , cp,  pleuritic cp, sob, palpitations, diaphoresis, cough, ha/lh/dizziness, numbness/tingling, neck pain/ stiffness, diarrhea, constipation, melena/brbpr, urinary symptoms, vaginal bleeding/discharge/odor, trauma, weakness, edema, calf pain/swelling/erythema, sick contacts, recent travel or rash.     On Exam: Vital Signs: I have reviewed the initial vital signs. Constitutional: Non toxic appearing pt sitting on stretcher speaking full sentences. Integumentary: No rash. No jaundice. EYES: No sclera Icterus. ENT: MMM NECK: Supple, non-tender, no meningeal signs. Cardiovascular: RRR, radial pulses 2/4 b/l. No JVD. Respiratory: BS present b/l, ctabl, no wheezing or crackles, no accessory muscle use, no stridor. Gastrointestinal: BS present throughout all 4 quadrants, soft, nd, pain to palpation to left lower pelvic region, no rebound tenderness or guarding, no cvat. (-) Murillo's (-) Rovsing (-) Obturator (-) Psoas, Musculoskeletal: FROM, no edema, no calf pain/swelling/erythema. Neurologic: AAOx3, motor 5/5 and sensation intact throughout upper and lowe ext, CN II-XII intact, No facial droop or slurring of speech. No focal deficits.

## 2022-05-29 NOTE — ED PROVIDER NOTE - NSFOLLOWUPCLINICS_GEN_ALL_ED_FT
[facility]  OB/GYN  [address] [place] [address]  Phone: [phone]  Fax:   Follow Up Time: 4-6 Days

## 2022-05-29 NOTE — ED PROVIDER NOTE - CARE PLAN
Assessment and plan of treatment:	Plan: Labs, ivf, anti emetic, pain control, urine, pelvic ultrasound, reassess.   Principal Discharge DX:	Vomiting  Assessment and plan of treatment:	Plan: Labs, ivf, anti emetic, pain control, urine, pelvic ultrasound, reassess.  Secondary Diagnosis:	Pregnancy   1 Principal Discharge DX:	Vomiting  Assessment and plan of treatment:	Plan: Labs, ivf, anti emetic, pain control, urine, pelvic ultrasound, reassess.  Secondary Diagnosis:	Pregnancy  Secondary Diagnosis:	Ovarian cyst

## 2022-05-29 NOTE — ED PROVIDER NOTE - OBJECTIVE STATEMENT
26F hx of recent pregnancy unknown gestational age presents with abd pain x 3 weeks with associated nausea, denies vaginal bleeding, follows with Dr. Price, patient notes that the pregnancy is undesired, has been taking ibuprofen for the pain.

## 2022-05-29 NOTE — ED PROVIDER NOTE - PROGRESS NOTE DETAILS
ED Attending LANG Alonso  Patient aware of all results, beta-hCG 41,542, understands ultrasound results showing IUP approximately 6 weeks fetal heart rate 178, left ovarian corpus luteal cyst, patient reports she has follow-up with OB/GYN recently seen.  Is not sure if she wants to keep the baby, not vomiting in the ED, tolerated p.o., aware symptomatic treatment, signs and symptoms to return for, will follow-up as discussed.  Copy of results provided.

## 2022-05-29 NOTE — ED ADULT NURSE NOTE - OBJECTIVE STATEMENT
pt biba, sts she is pregnant but unknown how long; c/o abdominal pain and vomiting x 3 weeks.. Airway patent no resp distress noted.

## 2022-05-29 NOTE — ED PROVIDER NOTE - PATIENT PORTAL LINK FT
You can access the FollowMyHealth Patient Portal offered by [facility] by registering at the following website: [email or web address]. By joining [employer]’s FollowMyHealth portal, you will also be able to view your health information using other applications (apps) compatible with our system.

## 2022-05-29 NOTE — ED PROVIDER NOTE - IV ALTEPLASE INCLUSION HIDDEN
Patient states to have drank bottle of Vodka and whiskey today and eventually developed abdominal pain with nausea; patient also complaining of generalized shaking which he says happens when is withdrawing. show

## 2022-05-29 NOTE — ED PROVIDER NOTE - PHYSICAL EXAMINATION
Vital Signs: I have reviewed the initial vital signs.  Constitutional: appears stated age, no acute distress.  HEENT: Airway patent, moist MM, no erythema/swelling/deformity of oral structures. EOMI, PERRLA.  CV: regular rate, regular rhythm, well-perfused extremities, 2+ b/l DP and radial pulses equal.  Lungs: BCTA, no increased WOB.  ABD: soft, NTND, no guarding or rebound, no pulsatile mass, no hernias, no flank pain.   MSK: Neck supple, nontender, nl ROM, no stepoff. Chest nontender. Back nontender in TLS spine or to b/l bony structures. Ext nontender, nl rom, no deformity.   INTEG: Skin warm, dry, no rash.  NEURO: A&Ox3, moving all extremities, normal speech  PSYCH: Calm, cooperative, normal affect and interaction.

## 2022-06-01 LAB
HSV 1 IGG TYPE-SPECIFIC AB: 26.1 INDEX
HSV 2 IGG TYPE-SPECIFIC AB: <0.9 INDEX

## 2022-06-01 RX ORDER — NITROFURANTOIN MACROCRYSTAL 50 MG
1 CAPSULE ORAL
Qty: 14 | Refills: 0
Start: 2022-06-01 | End: 2022-06-07

## 2022-06-02 NOTE — H&P PST ADULT - ASSESSMENT
A/P: 27yo  @ 8w for surgical management for termination of pregnancy.     200mg Doxycyline night before, 100mg PO Post operatively     Hg 11.7, O+  A/P: 25yo  @ 8w for surgical management for termination of pregnancy.     200mg Doxycyline night before well tolerated, 100mg Doxycycline PO Post operatively     Hg 11.7, O+     Reproductive life goals: patient interested in delaying pregnancy - reports priorities are avoiding device in uterus, is considering Depo Provera but reports she is not good with needles. Interested in Nexplanon or OCPs. Desires emergency contraception as back up until she decides.     Risks, benefits, alternatives of procedure reviewed. Consent forms signed and witnessed

## 2022-06-02 NOTE — ASU PATIENT PROFILE, ADULT - ANESTHESIA, PREVIOUS REACTION, PROFILE
Patient does not work. Lives at home with family but has own room/ space  Drinks 1 bottle of bourbon or 2 beers every other day, denies any smoking or drug use. none

## 2022-06-02 NOTE — H&P PST ADULT - HISTORY OF PRESENT ILLNESS
27yo  LMP 22 @ 8+3 by LMP c/w US presenting today for termination of pregnancy.     ObhHx:  delivery x1, induced  x7 via D&C   Complications: Denies   GynHx: Endorses STIs (GC, CT, Syphillis). reports lip/oral lesion that comes and goes   Primary Gyn Dr. Govea    PMH: ADHD, daily marijuana smoking   PSH: D&C x7    Meds: denies  Allergies: seasonal, NKDA     SocHx: Works as a  in schools, lives with daughter (Michelle) and partner, feels safe at home. Denies tobacco, drinks ETOH socially  27yo  LMP 22 @ 8+3 by LMP c/w US presenting today for termination of pregnancy. She is presenting today without complaints, denies HA, CP, SOB, F/C, N/V. Denies cramping pain or bleeding. She endorses she is firm in her decision for pregnancy termination     ObhHx:  delivery x1, induced  x7 via D&C   Complications: Denies   GynHx: Endorses STIs (GC, CT, Syphillis). reports lip/oral lesion that comes and goes   Primary Gyn Dr. Govea    PMH: ADHD, daily marijuana smoking   PSH: D&C x7    Meds: denies  Allergies: seasonal, NKDA     SocHx: Works as a  in schools, lives with daughter (Michelle) and partner, feels safe at home. Denies tobacco, drinks ETOH socially

## 2022-06-03 ENCOUNTER — OUTPATIENT (OUTPATIENT)
Dept: OUTPATIENT SERVICES | Facility: HOSPITAL | Age: 27
LOS: 1 days | Discharge: HOME | End: 2022-06-03
Payer: MEDICAID

## 2022-06-03 ENCOUNTER — TRANSCRIPTION ENCOUNTER (OUTPATIENT)
Age: 27
End: 2022-06-03

## 2022-06-03 ENCOUNTER — RESULT REVIEW (OUTPATIENT)
Age: 27
End: 2022-06-03

## 2022-06-03 VITALS
TEMPERATURE: 98 F | HEIGHT: 64 IN | OXYGEN SATURATION: 100 % | WEIGHT: 119.05 LBS | DIASTOLIC BLOOD PRESSURE: 62 MMHG | HEART RATE: 68 BPM | SYSTOLIC BLOOD PRESSURE: 102 MMHG | RESPIRATION RATE: 20 BRPM

## 2022-06-03 VITALS
RESPIRATION RATE: 20 BRPM | HEART RATE: 72 BPM | OXYGEN SATURATION: 100 % | SYSTOLIC BLOOD PRESSURE: 119 MMHG | DIASTOLIC BLOOD PRESSURE: 72 MMHG

## 2022-06-03 DIAGNOSIS — Z98.890 OTHER SPECIFIED POSTPROCEDURAL STATES: Chronic | ICD-10-CM

## 2022-06-03 DIAGNOSIS — O03.9 COMPLETE OR UNSPECIFIED SPONTANEOUS ABORTION WITHOUT COMPLICATION: Chronic | ICD-10-CM

## 2022-06-03 PROCEDURE — 59840 INDUCED ABORTION D&C: CPT

## 2022-06-03 PROCEDURE — 88304 TISSUE EXAM BY PATHOLOGIST: CPT | Mod: 26

## 2022-06-03 RX ORDER — SODIUM CHLORIDE 9 MG/ML
1000 INJECTION, SOLUTION INTRAVENOUS
Refills: 0 | Status: DISCONTINUED | OUTPATIENT
Start: 2022-06-03 | End: 2022-06-17

## 2022-06-03 RX ORDER — ONDANSETRON 8 MG/1
4 TABLET, FILM COATED ORAL ONCE
Refills: 0 | Status: DISCONTINUED | OUTPATIENT
Start: 2022-06-03 | End: 2022-06-17

## 2022-06-03 RX ORDER — HYDROMORPHONE HYDROCHLORIDE 2 MG/ML
0.5 INJECTION INTRAMUSCULAR; INTRAVENOUS; SUBCUTANEOUS
Refills: 0 | Status: DISCONTINUED | OUTPATIENT
Start: 2022-06-03 | End: 2022-06-03

## 2022-06-03 RX ADMIN — Medication 100 MILLIGRAM(S): at 11:13

## 2022-06-03 NOTE — BRIEF OPERATIVE NOTE - NSICDXBRIEFPROCEDURE_GEN_ALL_CORE_FT
PROCEDURES:  Dilation and curettage, for pregnancy termination 03-Jun-2022 09:40:05  Yareli Blanco

## 2022-06-03 NOTE — CHART NOTE - NSCHARTNOTEFT_GEN_A_CORE
PACU ANESTHESIA ADMISSION NOTE  ____  Intubated  TV:______       Rate: ______      FiO2: ______  x____  Patent Airway  x____  Full return of protective reflexes  x____  Full recovery from anesthesia / back to baseline   Mental Status:    x____ Awake    ____ Alert     ____ Drowsy    ____ Sedated  Nausea/Vomiting:   _x___ NO    ____ Yes,   See Post - Op Orders        Pain Scale (0-10):    ____ Treatment:   x____ None      ____ See Post - Op/PCA Orders  Post - Operative Fluids:    x____ Oral     ____ See Post - Op Orders  Plan: Discharge:    x____Home         _____Floor       _____Critical Care      _____  Other:_________________    Comments:

## 2022-06-03 NOTE — ASU DISCHARGE PLAN (ADULT/PEDIATRIC) - ASU DC SPECIAL INSTRUCTIONSFT
Present to the Emergency department if you saturate 2 pads per hour, for more than one hour. If you feel lightheaded or dizzy.

## 2022-06-03 NOTE — ASU DISCHARGE PLAN (ADULT/PEDIATRIC) - NS MD DC FALL RISK RISK
For information on Fall & Injury Prevention, visit: [email or web address] OR  [email or web address] OR  https://www.cdc.gov/steadi/patient.html

## 2022-06-03 NOTE — BRIEF OPERATIVE NOTE - OPERATION/FINDINGS
Small anteverted uterus   Sequential dilation completed with denniston dilators to size 8  uterine contents aspirated until gritty texture noted throughout uterine cavity   Good hemostasis post op   Post op ultrasound revealed empty uterus   aspirate inspected and villi and gestational sac noted and appropriate for gestational age

## 2022-06-07 LAB — SURGICAL PATHOLOGY STUDY: SIGNIFICANT CHANGE UP

## 2022-06-13 DIAGNOSIS — Z33.2 ENCOUNTER FOR ELECTIVE TERMINATION OF PREGNANCY: ICD-10-CM

## 2022-06-16 ENCOUNTER — APPOINTMENT (OUTPATIENT)
Dept: OBGYN | Facility: CLINIC | Age: 27
End: 2022-06-16

## 2022-06-23 ENCOUNTER — APPOINTMENT (OUTPATIENT)
Dept: OBGYN | Facility: CLINIC | Age: 27
End: 2022-06-23

## 2022-06-29 ENCOUNTER — APPOINTMENT (OUTPATIENT)
Dept: OBGYN | Facility: CLINIC | Age: 27
End: 2022-06-29

## 2022-06-29 ENCOUNTER — OUTPATIENT (OUTPATIENT)
Dept: OUTPATIENT SERVICES | Facility: HOSPITAL | Age: 27
LOS: 1 days | Discharge: HOME | End: 2022-06-29

## 2022-06-29 DIAGNOSIS — Z98.890 OTHER SPECIFIED POSTPROCEDURAL STATES: Chronic | ICD-10-CM

## 2022-06-29 DIAGNOSIS — O03.9 COMPLETE OR UNSPECIFIED SPONTANEOUS ABORTION WITHOUT COMPLICATION: Chronic | ICD-10-CM

## 2022-06-29 DIAGNOSIS — Z30.9 ENCOUNTER FOR CONTRACEPTIVE MANAGEMENT, UNSPECIFIED: ICD-10-CM

## 2022-06-29 PROCEDURE — 99213 OFFICE O/P EST LOW 20 MIN: CPT | Mod: 95

## 2022-06-29 RX ORDER — NORETHINDRONE ACETATE AND ETHINYL ESTRADIOL AND FERROUS FUMARATE 1MG-20(21)
1-20 KIT ORAL DAILY
Qty: 28 | Refills: 12 | Status: ACTIVE | COMMUNITY
Start: 2022-06-29 | End: 1900-01-01

## 2022-06-29 RX ORDER — METRONIDAZOLE 500 MG/1
500 TABLET ORAL TWICE DAILY
Qty: 14 | Refills: 0 | Status: ACTIVE | COMMUNITY
Start: 2022-06-29 | End: 1900-01-01

## 2022-06-29 NOTE — HISTORY OF PRESENT ILLNESS
[Home] : at home, [unfilled] , at the time of the visit. [Medical Office: ([facility])___] : at the medical office located in  [Verbal consent obtained from patient] : the patient, [unfilled] [FreeTextEntry1] : 27 y/o female for f/u top\par pt has no complaints\par no fever, no pain, no bleeding\par \par discussed birth control\par options given\par r/b of each method\par wants ocp-awar elevates risk of dvt/pe/mi/stroke\par all questions answered\par \par path reviewed with pt\par op report and previous note reviewed

## 2022-07-01 ENCOUNTER — EMERGENCY (EMERGENCY)
Facility: HOSPITAL | Age: 27
LOS: 0 days | Discharge: HOME | End: 2022-07-01
Attending: EMERGENCY MEDICINE | Admitting: EMERGENCY MEDICINE

## 2022-07-01 VITALS
WEIGHT: 119.93 LBS | OXYGEN SATURATION: 98 % | RESPIRATION RATE: 16 BRPM | DIASTOLIC BLOOD PRESSURE: 70 MMHG | SYSTOLIC BLOOD PRESSURE: 117 MMHG | HEIGHT: 64 IN | HEART RATE: 83 BPM | TEMPERATURE: 97 F

## 2022-07-01 DIAGNOSIS — N89.8 OTHER SPECIFIED NONINFLAMMATORY DISORDERS OF VAGINA: ICD-10-CM

## 2022-07-01 DIAGNOSIS — Z98.890 OTHER SPECIFIED POSTPROCEDURAL STATES: Chronic | ICD-10-CM

## 2022-07-01 DIAGNOSIS — F90.9 ATTENTION-DEFICIT HYPERACTIVITY DISORDER, UNSPECIFIED TYPE: ICD-10-CM

## 2022-07-01 DIAGNOSIS — R10.9 UNSPECIFIED ABDOMINAL PAIN: ICD-10-CM

## 2022-07-01 DIAGNOSIS — Z32.01 ENCOUNTER FOR PREGNANCY TEST, RESULT POSITIVE: ICD-10-CM

## 2022-07-01 DIAGNOSIS — R10.30 LOWER ABDOMINAL PAIN, UNSPECIFIED: ICD-10-CM

## 2022-07-01 DIAGNOSIS — Z86.19 PERSONAL HISTORY OF OTHER INFECTIOUS AND PARASITIC DISEASES: ICD-10-CM

## 2022-07-01 DIAGNOSIS — O03.9 COMPLETE OR UNSPECIFIED SPONTANEOUS ABORTION WITHOUT COMPLICATION: Chronic | ICD-10-CM

## 2022-07-01 LAB
ALBUMIN SERPL ELPH-MCNC: 4.8 G/DL — SIGNIFICANT CHANGE UP (ref 3.5–5.2)
ALP SERPL-CCNC: 38 U/L — SIGNIFICANT CHANGE UP (ref 30–115)
ALT FLD-CCNC: 10 U/L — SIGNIFICANT CHANGE UP (ref 0–41)
ANION GAP SERPL CALC-SCNC: 13 MMOL/L — SIGNIFICANT CHANGE UP (ref 7–14)
APPEARANCE UR: ABNORMAL
AST SERPL-CCNC: 22 U/L — SIGNIFICANT CHANGE UP (ref 0–41)
BACTERIA # UR AUTO: NEGATIVE — SIGNIFICANT CHANGE UP
BASOPHILS # BLD AUTO: 0.03 K/UL — SIGNIFICANT CHANGE UP (ref 0–0.2)
BASOPHILS NFR BLD AUTO: 0.5 % — SIGNIFICANT CHANGE UP (ref 0–1)
BILIRUB SERPL-MCNC: 0.5 MG/DL — SIGNIFICANT CHANGE UP (ref 0.2–1.2)
BILIRUB UR-MCNC: NEGATIVE — SIGNIFICANT CHANGE UP
BUN SERPL-MCNC: 18 MG/DL — SIGNIFICANT CHANGE UP (ref 10–20)
CALCIUM SERPL-MCNC: 9.7 MG/DL — SIGNIFICANT CHANGE UP (ref 8.5–10.1)
CHLORIDE SERPL-SCNC: 97 MMOL/L — LOW (ref 98–110)
CO2 SERPL-SCNC: 26 MMOL/L — SIGNIFICANT CHANGE UP (ref 17–32)
COLOR SPEC: YELLOW — SIGNIFICANT CHANGE UP
CREAT SERPL-MCNC: 0.7 MG/DL — SIGNIFICANT CHANGE UP (ref 0.7–1.5)
DIFF PNL FLD: ABNORMAL
EGFR: 121 ML/MIN/1.73M2 — SIGNIFICANT CHANGE UP
EOSINOPHIL # BLD AUTO: 0.03 K/UL — SIGNIFICANT CHANGE UP (ref 0–0.7)
EOSINOPHIL NFR BLD AUTO: 0.5 % — SIGNIFICANT CHANGE UP (ref 0–8)
EPI CELLS # UR: 22 /HPF — HIGH (ref 0–5)
GLUCOSE SERPL-MCNC: 95 MG/DL — SIGNIFICANT CHANGE UP (ref 70–99)
GLUCOSE UR QL: SIGNIFICANT CHANGE UP
HCG SERPL-ACNC: 7.6 MIU/ML — HIGH
HCT VFR BLD CALC: 42.4 % — SIGNIFICANT CHANGE UP (ref 37–47)
HGB BLD-MCNC: 14 G/DL — SIGNIFICANT CHANGE UP (ref 12–16)
HYALINE CASTS # UR AUTO: 4 /LPF — SIGNIFICANT CHANGE UP (ref 0–7)
IMM GRANULOCYTES NFR BLD AUTO: 0.2 % — SIGNIFICANT CHANGE UP (ref 0.1–0.3)
KETONES UR-MCNC: ABNORMAL
LEUKOCYTE ESTERASE UR-ACNC: NEGATIVE — SIGNIFICANT CHANGE UP
LYMPHOCYTES # BLD AUTO: 1.99 K/UL — SIGNIFICANT CHANGE UP (ref 1.2–3.4)
LYMPHOCYTES # BLD AUTO: 30.2 % — SIGNIFICANT CHANGE UP (ref 20.5–51.1)
MCHC RBC-ENTMCNC: 27.8 PG — SIGNIFICANT CHANGE UP (ref 27–31)
MCHC RBC-ENTMCNC: 33 G/DL — SIGNIFICANT CHANGE UP (ref 32–37)
MCV RBC AUTO: 84.1 FL — SIGNIFICANT CHANGE UP (ref 81–99)
MONOCYTES # BLD AUTO: 0.32 K/UL — SIGNIFICANT CHANGE UP (ref 0.1–0.6)
MONOCYTES NFR BLD AUTO: 4.9 % — SIGNIFICANT CHANGE UP (ref 1.7–9.3)
NEUTROPHILS # BLD AUTO: 4.21 K/UL — SIGNIFICANT CHANGE UP (ref 1.4–6.5)
NEUTROPHILS NFR BLD AUTO: 63.7 % — SIGNIFICANT CHANGE UP (ref 42.2–75.2)
NITRITE UR-MCNC: NEGATIVE — SIGNIFICANT CHANGE UP
NRBC # BLD: 0 /100 WBCS — SIGNIFICANT CHANGE UP (ref 0–0)
PH UR: 6.5 — SIGNIFICANT CHANGE UP (ref 5–8)
PLATELET # BLD AUTO: 228 K/UL — SIGNIFICANT CHANGE UP (ref 130–400)
POTASSIUM SERPL-MCNC: 4.5 MMOL/L — SIGNIFICANT CHANGE UP (ref 3.5–5)
POTASSIUM SERPL-SCNC: 4.5 MMOL/L — SIGNIFICANT CHANGE UP (ref 3.5–5)
PROT SERPL-MCNC: 7.8 G/DL — SIGNIFICANT CHANGE UP (ref 6–8)
PROT UR-MCNC: ABNORMAL
RBC # BLD: 5.04 M/UL — SIGNIFICANT CHANGE UP (ref 4.2–5.4)
RBC # FLD: 14 % — SIGNIFICANT CHANGE UP (ref 11.5–14.5)
RBC CASTS # UR COMP ASSIST: 2 /HPF — SIGNIFICANT CHANGE UP (ref 0–4)
SODIUM SERPL-SCNC: 136 MMOL/L — SIGNIFICANT CHANGE UP (ref 135–146)
SP GR SPEC: 1.04 — HIGH (ref 1.01–1.03)
UROBILINOGEN FLD QL: ABNORMAL
WBC # BLD: 6.59 K/UL — SIGNIFICANT CHANGE UP (ref 4.8–10.8)
WBC # FLD AUTO: 6.59 K/UL — SIGNIFICANT CHANGE UP (ref 4.8–10.8)
WBC UR QL: 3 /HPF — SIGNIFICANT CHANGE UP (ref 0–5)

## 2022-07-01 PROCEDURE — 76937 US GUIDE VASCULAR ACCESS: CPT | Mod: 26

## 2022-07-01 PROCEDURE — 99285 EMERGENCY DEPT VISIT HI MDM: CPT | Mod: 25

## 2022-07-01 PROCEDURE — 36000 PLACE NEEDLE IN VEIN: CPT

## 2022-07-01 RX ORDER — ACETAMINOPHEN 500 MG
975 TABLET ORAL ONCE
Refills: 0 | Status: COMPLETED | OUTPATIENT
Start: 2022-07-01 | End: 2022-07-01

## 2022-07-01 RX ORDER — SODIUM CHLORIDE 9 MG/ML
1000 INJECTION, SOLUTION INTRAVENOUS ONCE
Refills: 0 | Status: COMPLETED | OUTPATIENT
Start: 2022-07-01 | End: 2022-07-01

## 2022-07-01 RX ORDER — ACETAMINOPHEN 500 MG
975 TABLET ORAL ONCE
Refills: 0 | Status: DISCONTINUED | OUTPATIENT
Start: 2022-07-01 | End: 2022-07-01

## 2022-07-01 RX ADMIN — Medication 975 MILLIGRAM(S): at 21:30

## 2022-07-01 RX ADMIN — SODIUM CHLORIDE 1000 MILLILITER(S): 9 INJECTION, SOLUTION INTRAVENOUS at 20:30

## 2022-07-01 NOTE — ED ADULT TRIAGE NOTE - PATIENT ON (OXYGEN DELIVERY METHOD)
----- Message from Monae Raines MD sent at 12/11/2017  8:37 AM CST -----  Please call this patient with blood test results. Her hepatitis C screening blood test was negative. Her triglycerides are slightly elevated. I would like her to start healthy lifestyle changes with diet and exercise. Please encourage this patient to avoid foods high in cholesterol and fat. Her thyroid-stimulating hormone level was normal. She can continue on her current dose of levothyroxine. Her hemoglobin A1c was 10.7, this indicates that her diabetes is uncontrolled and her hemoglobin A1c has worsened. I would like her to have close follow-up with endocrinology to make changes in her insulin dosing, please encourage her to make a follow-up appointment with her endocrinologist as soon as possible. I would like her to increase her ferrous sulfate to 325 mg twice daily. Her kidney function appears to be worsening also, I would like her to follow-up with a nephrologist. Please encourage her to make appointments with the specialist and requests that they send a copy of their  treatment plan to our office.   room air

## 2022-07-01 NOTE — ED PROVIDER NOTE - PHYSICAL EXAMINATION
_  Vital signs reviewed; ABCs intact  GENERAL: Well nourished, comfortable  SKIN: Warm, dry  HEAD & NECK: NCAT, supple neck  EYES: EOMI, PER B/L, no scleral icterus, no conjunctival injection, no conjunctival pallor  ENT: MMM; uvula midline; no oropharyngeal erythema or exudates  CARD: RRR, S1, S2; no murmurs, no rubs, no gallops  RESP: Normal respiratory effort, CTAB, no rales, no wheezing  ABD: Soft, ND, NT, no rebound, no guarding, +BS; no CVAT  PELVIC: Normal female external genitalia, no bleeding from introitus, normal vaginal epithelium, closed cervical os with +greenish discharge, no CMT, no adnexal tenderness or masses (performed in the presence of Dr. Isidro)  EXT: No edema; pulses palpable distally; no calf tenderness; symmetrical calf circumferences  NEUROMSK: Grossly intact  PSYCH: AAOx3, cooperative, appropriate

## 2022-07-01 NOTE — ED PROVIDER NOTE - NSFOLLOWUPCLINICS_GEN_ALL_ED_FT
[facility]  OB/GYN  [address] [place] [address]  Phone: [phone]  Fax:   Established Patient  Follow Up Time: Routine

## 2022-07-01 NOTE — ED PROVIDER NOTE - NSFOLLOWUPINSTRUCTIONS_ED_ALL_ED_FT
Your visit in the emergency department today did not reveal anything immediately life-threatening.    However, it is important that you follow-up with your PRIMARY CARE PHYSICIAN within 3-5 days.  If you do not have a primary care physician, please call your health insurance to select one.  If you do not have health insurance, please schedule an appointment with the [facility]: [phone], located at [address] [place] [address].    Additionally, it is important that you follow-up with SPECIALIST. If you are unable to schedule a visit(s) with the provided specialist(s), please speak with your primary care doctor for guidance to such a specialist.    ---------------------------------------------------------------------------------------------------    For pain, you may take the following over-the-counter medication(s):  - Acetaminophen (Tylenol, Midol) 650-1000 mg up to every 4-6 hours, as needed (max 4000mg/24hrs), AND/OR  - Ibuprofen (Motrin, Advil) 400-800 mg up to every 6-8 hours, as needed (max 3200mg/24hrs)    ---------------------------------------------------------------------------------------------------    Abdominal Pain    Many things can cause abdominal pain. Many times, abdominal pain is not caused by a disease and will improve without treatment. Your health care provider will do a physical exam to determine if there is a dangerous cause of your pain; blood tests and imaging may help determine the cause of your pain. However, in many cases, no cause may be found and you may need further testing as an outpatient. Monitor your abdominal pain for any changes.     SEEK IMMEDIATE MEDICAL CARE IF YOU HAVE ANY OF THE FOLLOWING SYMPTOMS: worsening abdominal pain, uncontrollable vomiting, profuse diarrhea, inability to have bowel movements or pass gas, black or bloody stools, fever accompanying chest pain or back pain, or fainting. These symptoms may represent a serious problem that is an emergency. Do not wait to see if the symptoms will go away. Get medical help right away. Call 911 and do not drive yourself to the hospital.    ---------------------------------------------------------------------------------------------------    A sexually transmitted disease (STD) is a disease or infection that may be passed (transmitted) from person to person, usually during sexual activity. This may happen by way of saliva, semen, blood, vaginal mucus, or urine. Symptoms vary depending on the type of STD acquired and may include pain in the groin, discharge, and lesions or a rash. If you are started on an antibiotic, take it exactly as instructed. Avoid sexual contact of any kind until cleared by a health care professional. Contact your sexual partner(s) to inform them of your diagnosis so that they may be tested and treated as well.    SEEK IMMEDIATE MEDICAL CARE IF YOU HAVE ANY OF THE FOLLOWING SYMPTOMS: severe abdominal pain, high fever, nausea/vomiting, or unintended weight loss. Your visit in the emergency department today did not reveal anything immediately life-threatening.    However, it is important that you follow-up with your PRIMARY CARE PHYSICIAN and GYNECOLOGY within 3-5 days.    ---------------------------------------------------------------------------------------------------    For pain, you may take the following over-the-counter medication(s):  - Acetaminophen (Tylenol, Midol) 650-1000 mg up to every 4-6 hours, as needed (max 4000mg/24hrs), AND/OR  - Ibuprofen (Motrin, Advil) 400-800 mg up to every 6-8 hours, as needed (max 3200mg/24hrs)    ---------------------------------------------------------------------------------------------------    Abdominal Pain    Many things can cause abdominal pain. Many times, abdominal pain is not caused by a disease and will improve without treatment. Your health care provider will do a physical exam to determine if there is a dangerous cause of your pain; blood tests and imaging may help determine the cause of your pain. However, in many cases, no cause may be found and you may need further testing as an outpatient. Monitor your abdominal pain for any changes.     SEEK IMMEDIATE MEDICAL CARE IF YOU HAVE ANY OF THE FOLLOWING SYMPTOMS: worsening abdominal pain, uncontrollable vomiting, profuse diarrhea, inability to have bowel movements or pass gas, black or bloody stools, fever accompanying chest pain or back pain, or fainting. These symptoms may represent a serious problem that is an emergency. Do not wait to see if the symptoms will go away. Get medical help right away. Call 911 and do not drive yourself to the hospital.    ---------------------------------------------------------------------------------------------------    A sexually transmitted disease (STD) is a disease or infection that may be passed (transmitted) from person to person, usually during sexual activity. This may happen by way of saliva, semen, blood, vaginal mucus, or urine. Symptoms vary depending on the type of STD acquired and may include pain in the groin, discharge, and lesions or a rash. If you are started on an antibiotic, take it exactly as instructed. Avoid sexual contact of any kind until cleared by a health care professional. Contact your sexual partner(s) to inform them of your diagnosis so that they may be tested and treated as well.    SEEK IMMEDIATE MEDICAL CARE IF YOU HAVE ANY OF THE FOLLOWING SYMPTOMS: severe abdominal pain, high fever, nausea/vomiting, or unintended weight loss.

## 2022-07-01 NOTE — ED PROVIDER NOTE - PATIENT PORTAL LINK FT
You can access the FollowMyHealth Patient Portal offered by [facility] by registering at the following website: [email or web address]. By joining [employer]’s FollowMyHealth portal, you will also be able to view your health information using other applications (apps) compatible with our system.

## 2022-07-01 NOTE — ED PROVIDER NOTE - OBJECTIVE STATEMENT
Patient is a 25 yo F, , prior h/o syphilis, gonorrhea, and chlamydia, presenting for abdominal pain since this morning. Patient states that she had an elected D&C on 6/3/2022, after which she had vaginal bleeding for 2 weeks (now resolved). She then developed fishy odor a few days ago, prompting a call to her Gynecologist (Dr. Hale), who sent her antibiotics (pt cannot recall name, but metronidazole?). Reports vaginal discharge, but denies dysuria, hematuria, frequency or urgency. Patient is sexually active with one male partner with whom she states she is exclusive, but has concern for STIs. No other complaints - no fever/chills, rhinorrhea, sore throat, CP, palpitations, SOB, cough, NVD, new joint pain, FND, rash, trauma.

## 2022-07-01 NOTE — ED PROVIDER NOTE - NSICDXPASTMEDICALHX_GEN_ALL_CORE_FT
PAST MEDICAL HISTORY:  ADHD     Chlamydia     History of syphilis treated    Trichomonal infection

## 2022-07-01 NOTE — ED PROVIDER NOTE - CLINICAL SUMMARY MEDICAL DECISION MAKING FREE TEXT BOX
26-year-old female presenting to ED for vaginal discharge and lower abdominal pain.  Patient shortly had positive urine pregnancy test and blood work demonstrated a beta hCG of 7.6 most likely downtrending due to D&C.  Talk with GYN since patient is a patient of theirs to inform that patient is in the emergency department they will follow her in clinic.  No acute intervention at this time DC home

## 2022-07-01 NOTE — ED PROVIDER NOTE - PROGRESS NOTE DETAILS
Resident AO: Given the positive POCT pregnancy test, will need to obtain labs. Patient aware and agrees. Resident AO: Beta downtrending will discharge. Patient declined STI treatment in the ED, wants to wait for results.

## 2022-07-01 NOTE — ED ADULT NURSE NOTE - OBJECTIVE STATEMENT
Pt on antibiotics and birth control , pt states took pills on empty stomach and became nauseous. Pt denies chest pain , SOB.

## 2022-07-01 NOTE — ED PROVIDER NOTE - ATTENDING CONTRIBUTION TO CARE
26-year-old  female presents to ED for lower abdominal pain that started this morning.  Patient had a D&C on 6/3/2022 and initially had vaginal bleeding that is not happening.  She developed a fishy odor and called her gynecologist to send her antibiotics that she has been taking.  However patient states she still has some dysuria with discharge.  No nausea vomiting diarrhea constipation.    Const: NAD  Eyes: PERRL, no conjunctival injection  HENT:  Neck supple without meningismus   CV: RRR, Warm, well-perfused extremities  RESP: CTA B/L, no tachypnea   GI: soft, non-tender, non-distended  : (exam done with Dr. Mayen), no CMT, +vaginal dc  MSK: No gross deformities appreciated  Skin: Warm, dry. No rashes  Neuro: Alert, CNs II-XII grossly intact. Sensation and motor function of extremities grossly intact.  Psych: Appropriate mood and affect.    upreg

## 2022-07-01 NOTE — ED ADULT TRIAGE NOTE - CHIEF COMPLAINT QUOTE
Pt states she was put on abx and birth control from her doctor. pt states she took the abx on an empty stomach and got nauseous and sweaty and had abd pain.

## 2022-07-02 LAB
HBV SURFACE AB SER-ACNC: SIGNIFICANT CHANGE UP
HBV SURFACE AG SER-ACNC: SIGNIFICANT CHANGE UP
HCV AB S/CO SERPL IA: 0.05 COI — SIGNIFICANT CHANGE UP
HCV AB SERPL-IMP: SIGNIFICANT CHANGE UP
T PALLIDUM AB TITR SER: NEGATIVE — SIGNIFICANT CHANGE UP

## 2022-07-03 LAB
CULTURE RESULTS: SIGNIFICANT CHANGE UP
SPECIMEN SOURCE: SIGNIFICANT CHANGE UP

## 2022-07-05 ENCOUNTER — NON-APPOINTMENT (OUTPATIENT)
Age: 27
End: 2022-07-05

## 2022-07-27 ENCOUNTER — INPATIENT (INPATIENT)
Facility: HOSPITAL | Age: 27
LOS: 1 days | Discharge: HOME | End: 2022-07-29
Attending: INTERNAL MEDICINE | Admitting: INTERNAL MEDICINE

## 2022-07-27 VITALS
OXYGEN SATURATION: 99 % | SYSTOLIC BLOOD PRESSURE: 143 MMHG | DIASTOLIC BLOOD PRESSURE: 79 MMHG | HEART RATE: 66 BPM | WEIGHT: 147.93 LBS | HEIGHT: 64 IN | RESPIRATION RATE: 22 BRPM

## 2022-07-27 DIAGNOSIS — Z98.890 OTHER SPECIFIED POSTPROCEDURAL STATES: Chronic | ICD-10-CM

## 2022-07-27 DIAGNOSIS — O03.9 COMPLETE OR UNSPECIFIED SPONTANEOUS ABORTION WITHOUT COMPLICATION: Chronic | ICD-10-CM

## 2022-07-27 PROBLEM — Z86.19 PERSONAL HISTORY OF OTHER INFECTIOUS AND PARASITIC DISEASES: Chronic | Status: ACTIVE | Noted: 2022-07-01

## 2022-07-27 LAB
ALBUMIN SERPL ELPH-MCNC: 4.6 G/DL — SIGNIFICANT CHANGE UP (ref 3.5–5.2)
ALP SERPL-CCNC: 29 U/L — LOW (ref 30–115)
ALT FLD-CCNC: 22 U/L — SIGNIFICANT CHANGE UP (ref 0–41)
ANION GAP SERPL CALC-SCNC: 16 MMOL/L — HIGH (ref 7–14)
AST SERPL-CCNC: 30 U/L — SIGNIFICANT CHANGE UP (ref 0–41)
BASE EXCESS BLDV CALC-SCNC: -0.5 MMOL/L — SIGNIFICANT CHANGE UP (ref -2–3)
BASOPHILS # BLD AUTO: 0.05 K/UL — SIGNIFICANT CHANGE UP (ref 0–0.2)
BASOPHILS NFR BLD AUTO: 1.1 % — HIGH (ref 0–1)
BILIRUB SERPL-MCNC: 0.3 MG/DL — SIGNIFICANT CHANGE UP (ref 0.2–1.2)
BUN SERPL-MCNC: 14 MG/DL — SIGNIFICANT CHANGE UP (ref 10–20)
CA-I SERPL-SCNC: 1.06 MMOL/L — LOW (ref 1.15–1.33)
CALCIUM SERPL-MCNC: 9.4 MG/DL — SIGNIFICANT CHANGE UP (ref 8.5–10.1)
CHLORIDE SERPL-SCNC: 102 MMOL/L — SIGNIFICANT CHANGE UP (ref 98–110)
CO2 SERPL-SCNC: 22 MMOL/L — SIGNIFICANT CHANGE UP (ref 17–32)
CREAT SERPL-MCNC: 0.8 MG/DL — SIGNIFICANT CHANGE UP (ref 0.7–1.5)
EGFR: 104 ML/MIN/1.73M2 — SIGNIFICANT CHANGE UP
EOSINOPHIL # BLD AUTO: 0.02 K/UL — SIGNIFICANT CHANGE UP (ref 0–0.7)
EOSINOPHIL NFR BLD AUTO: 0.4 % — SIGNIFICANT CHANGE UP (ref 0–8)
GAS PNL BLDV: 134 MMOL/L — LOW (ref 136–145)
GAS PNL BLDV: SIGNIFICANT CHANGE UP
GLUCOSE SERPL-MCNC: 125 MG/DL — HIGH (ref 70–99)
HCG SERPL QL: NEGATIVE — SIGNIFICANT CHANGE UP
HCO3 BLDV-SCNC: 25 MMOL/L — SIGNIFICANT CHANGE UP (ref 22–29)
HCT VFR BLD CALC: 41.8 % — SIGNIFICANT CHANGE UP (ref 37–47)
HCT VFR BLDA CALC: 36 % — LOW (ref 39–51)
HGB BLD CALC-MCNC: 12.1 G/DL — LOW (ref 12.6–17.4)
HGB BLD-MCNC: 13.6 G/DL — SIGNIFICANT CHANGE UP (ref 12–16)
IMM GRANULOCYTES NFR BLD AUTO: 0.2 % — SIGNIFICANT CHANGE UP (ref 0.1–0.3)
LACTATE BLDV-MCNC: 1.5 MMOL/L — SIGNIFICANT CHANGE UP (ref 0.5–2)
LACTATE SERPL-SCNC: 4.9 MMOL/L — CRITICAL HIGH (ref 0.7–2)
LIDOCAIN IGE QN: 25 U/L — SIGNIFICANT CHANGE UP (ref 7–60)
LYMPHOCYTES # BLD AUTO: 1.77 K/UL — SIGNIFICANT CHANGE UP (ref 1.2–3.4)
LYMPHOCYTES # BLD AUTO: 39.6 % — SIGNIFICANT CHANGE UP (ref 20.5–51.1)
MCHC RBC-ENTMCNC: 28 PG — SIGNIFICANT CHANGE UP (ref 27–31)
MCHC RBC-ENTMCNC: 32.5 G/DL — SIGNIFICANT CHANGE UP (ref 32–37)
MCV RBC AUTO: 86.2 FL — SIGNIFICANT CHANGE UP (ref 81–99)
MONOCYTES # BLD AUTO: 0.27 K/UL — SIGNIFICANT CHANGE UP (ref 0.1–0.6)
MONOCYTES NFR BLD AUTO: 6 % — SIGNIFICANT CHANGE UP (ref 1.7–9.3)
NEUTROPHILS # BLD AUTO: 2.35 K/UL — SIGNIFICANT CHANGE UP (ref 1.4–6.5)
NEUTROPHILS NFR BLD AUTO: 52.7 % — SIGNIFICANT CHANGE UP (ref 42.2–75.2)
NRBC # BLD: 0 /100 WBCS — SIGNIFICANT CHANGE UP (ref 0–0)
NT-PROBNP SERPL-SCNC: 55 PG/ML — SIGNIFICANT CHANGE UP (ref 0–300)
PCO2 BLDV: 46 MMHG — HIGH (ref 39–42)
PH BLDV: 7.35 — SIGNIFICANT CHANGE UP (ref 7.32–7.43)
PLATELET # BLD AUTO: 282 K/UL — SIGNIFICANT CHANGE UP (ref 130–400)
PO2 BLDV: 44 MMHG — SIGNIFICANT CHANGE UP
POTASSIUM BLDV-SCNC: 5.9 MMOL/L — HIGH (ref 3.5–5.1)
POTASSIUM SERPL-MCNC: 4.6 MMOL/L — SIGNIFICANT CHANGE UP (ref 3.5–5)
POTASSIUM SERPL-SCNC: 4.6 MMOL/L — SIGNIFICANT CHANGE UP (ref 3.5–5)
PROT SERPL-MCNC: 8.2 G/DL — HIGH (ref 6–8)
RBC # BLD: 4.85 M/UL — SIGNIFICANT CHANGE UP (ref 4.2–5.4)
RBC # FLD: 13.8 % — SIGNIFICANT CHANGE UP (ref 11.5–14.5)
SAO2 % BLDV: 72.9 % — SIGNIFICANT CHANGE UP
SARS-COV-2 RNA SPEC QL NAA+PROBE: SIGNIFICANT CHANGE UP
SODIUM SERPL-SCNC: 140 MMOL/L — SIGNIFICANT CHANGE UP (ref 135–146)
WBC # BLD: 4.47 K/UL — LOW (ref 4.8–10.8)
WBC # FLD AUTO: 4.47 K/UL — LOW (ref 4.8–10.8)

## 2022-07-27 PROCEDURE — 93010 ELECTROCARDIOGRAM REPORT: CPT

## 2022-07-27 PROCEDURE — 99285 EMERGENCY DEPT VISIT HI MDM: CPT

## 2022-07-27 PROCEDURE — 74177 CT ABD & PELVIS W/CONTRAST: CPT | Mod: 26,MA

## 2022-07-27 RX ORDER — ACETAMINOPHEN 500 MG
975 TABLET ORAL ONCE
Refills: 0 | Status: COMPLETED | OUTPATIENT
Start: 2022-07-27 | End: 2022-07-27

## 2022-07-27 RX ORDER — SODIUM CHLORIDE 9 MG/ML
1000 INJECTION INTRAMUSCULAR; INTRAVENOUS; SUBCUTANEOUS ONCE
Refills: 0 | Status: DISCONTINUED | OUTPATIENT
Start: 2022-07-27 | End: 2022-07-29

## 2022-07-27 RX ORDER — ONDANSETRON 8 MG/1
4 TABLET, FILM COATED ORAL ONCE
Refills: 0 | Status: COMPLETED | OUTPATIENT
Start: 2022-07-27 | End: 2022-07-27

## 2022-07-27 RX ORDER — FAMOTIDINE 10 MG/ML
20 INJECTION INTRAVENOUS ONCE
Refills: 0 | Status: COMPLETED | OUTPATIENT
Start: 2022-07-27 | End: 2022-07-27

## 2022-07-27 RX ORDER — SODIUM CHLORIDE 9 MG/ML
2000 INJECTION INTRAMUSCULAR; INTRAVENOUS; SUBCUTANEOUS ONCE
Refills: 0 | Status: COMPLETED | OUTPATIENT
Start: 2022-07-27 | End: 2022-07-27

## 2022-07-27 RX ORDER — ONDANSETRON 8 MG/1
4 TABLET, FILM COATED ORAL ONCE
Refills: 0 | Status: DISCONTINUED | OUTPATIENT
Start: 2022-07-27 | End: 2022-07-27

## 2022-07-27 RX ORDER — ONDANSETRON 8 MG/1
4 TABLET, FILM COATED ORAL EVERY 8 HOURS
Refills: 0 | Status: DISCONTINUED | OUTPATIENT
Start: 2022-07-27 | End: 2022-07-29

## 2022-07-27 RX ORDER — KETOROLAC TROMETHAMINE 30 MG/ML
15 SYRINGE (ML) INJECTION ONCE
Refills: 0 | Status: DISCONTINUED | OUTPATIENT
Start: 2022-07-27 | End: 2022-07-27

## 2022-07-27 RX ORDER — ACETAMINOPHEN 500 MG
975 TABLET ORAL EVERY 8 HOURS
Refills: 0 | Status: DISCONTINUED | OUTPATIENT
Start: 2022-07-27 | End: 2022-07-29

## 2022-07-27 RX ORDER — METOCLOPRAMIDE HCL 10 MG
10 TABLET ORAL ONCE
Refills: 0 | Status: COMPLETED | OUTPATIENT
Start: 2022-07-27 | End: 2022-07-27

## 2022-07-27 RX ORDER — PANTOPRAZOLE SODIUM 20 MG/1
40 TABLET, DELAYED RELEASE ORAL
Refills: 0 | Status: DISCONTINUED | OUTPATIENT
Start: 2022-07-27 | End: 2022-07-29

## 2022-07-27 RX ORDER — ENOXAPARIN SODIUM 100 MG/ML
40 INJECTION SUBCUTANEOUS EVERY 24 HOURS
Refills: 0 | Status: DISCONTINUED | OUTPATIENT
Start: 2022-07-27 | End: 2022-07-29

## 2022-07-27 RX ORDER — SODIUM CHLORIDE 9 MG/ML
1000 INJECTION, SOLUTION INTRAVENOUS
Refills: 0 | Status: DISCONTINUED | OUTPATIENT
Start: 2022-07-27 | End: 2022-07-29

## 2022-07-27 RX ORDER — MORPHINE SULFATE 50 MG/1
2 CAPSULE, EXTENDED RELEASE ORAL ONCE
Refills: 0 | Status: DISCONTINUED | OUTPATIENT
Start: 2022-07-27 | End: 2022-07-29

## 2022-07-27 RX ADMIN — FAMOTIDINE 20 MILLIGRAM(S): 10 INJECTION INTRAVENOUS at 10:25

## 2022-07-27 RX ADMIN — SODIUM CHLORIDE 2000 MILLILITER(S): 9 INJECTION INTRAMUSCULAR; INTRAVENOUS; SUBCUTANEOUS at 09:36

## 2022-07-27 RX ADMIN — Medication 20 MILLIGRAM(S): at 15:24

## 2022-07-27 RX ADMIN — Medication 975 MILLIGRAM(S): at 15:24

## 2022-07-27 RX ADMIN — Medication 15 MILLIGRAM(S): at 17:56

## 2022-07-27 RX ADMIN — ONDANSETRON 4 MILLIGRAM(S): 8 TABLET, FILM COATED ORAL at 09:37

## 2022-07-27 RX ADMIN — Medication 104 MILLIGRAM(S): at 16:07

## 2022-07-27 RX ADMIN — ONDANSETRON 4 MILLIGRAM(S): 8 TABLET, FILM COATED ORAL at 17:56

## 2022-07-27 RX ADMIN — Medication 15 MILLIGRAM(S): at 10:54

## 2022-07-27 RX ADMIN — SODIUM CHLORIDE 75 MILLILITER(S): 9 INJECTION, SOLUTION INTRAVENOUS at 21:34

## 2022-07-27 RX ADMIN — ONDANSETRON 4 MILLIGRAM(S): 8 TABLET, FILM COATED ORAL at 12:51

## 2022-07-27 RX ADMIN — SODIUM CHLORIDE 75 MILLILITER(S): 9 INJECTION, SOLUTION INTRAVENOUS at 17:55

## 2022-07-27 NOTE — ED PROVIDER NOTE - OBJECTIVE STATEMENT
26-year-old female no past medical history presents to the ED complaining of draining to 4 locals last night and had multiple episodes of nausea vomiting and diarrhea.  I am not able to keep anything down.  Positive chills.  No chest pain, shortness of breath, urinary symptoms.

## 2022-07-27 NOTE — H&P ADULT - NSHPSOCIALHISTORY_GEN_ALL_CORE
(+) drinking  (-) smoking tobacco  (-) recreational drug use (+) drinking  (-) smoking tobacco  (+) marijuana use (3 blunts/day)    (+) sexually active    (+) lives with son

## 2022-07-27 NOTE — H&P ADULT - ATTENDING COMMENTS
27yo F hx of previous abortions x8, syphilis and chlamydia infection treated presenting to the hospital with acute abdominal pain after having intractable nausea/vomiting    Abdominal pain , suspected  Cannabinoid hyperemesis syndrome vs PID but no signs of toxicity, pyrexia or leucocytosis  CT abd-Enlarged liver. Right flank subcutaneous hematoma  Lactic acidosis-resolved with hydration  Active ETOH & Marijuana abuse  Recent D&C 06/2022  S/p Syphilis & chlamydia treatment 2012  Get GYN consult. Follow GC/chlamydia, GI PCR for diarrhea  HCG downtrending. Serum pregnancy negative  Expected inpatient care exceeds 48 hrs.

## 2022-07-27 NOTE — ED ADULT TRIAGE NOTE - CHIEF COMPLAINT QUOTE
BIBA from home for vomiting with diarrhea since last night. c/o abdominal pain and chills. BIBA from home for vomiting with diarrhea since last night. c/o abdominal pain and chills. pt refusing to allow temperature to be taken in triage.

## 2022-07-27 NOTE — ED PROVIDER NOTE - ATTENDING APP SHARED VISIT CONTRIBUTION OF CARE
27 yo f with no pmh presents with c/o n/v/d and abd pain this morning.  pt says had 2 Four Lokos last night.  +chills.  no fever.  no cp or sob.  some streaks of blood from vomiting today.  no rectal bleeding.  urinating normally.  exam: nad, ncat, perrl, eomi, dry mm, rrr, ctab, abd soft, mildly ttp periumbilical, nd aox3, imp: pt with n/v/d with abd pain, will check labs and symptomatic tx, reassess

## 2022-07-27 NOTE — H&P ADULT - HISTORY OF PRESENT ILLNESS
27yo F hx of 8 abortions (most recent D+C in 6/2022), syphillis, chylydia infection presenting to the hospital for acute abdominal pain x 1 day. Prior to pain, the patient had 2 Four Lokos alcohol bverages last night. This pain was associated with chills, nausea, vomiting, and diarrhea. Denies chest pain, shortness of breath, dysuria.     Previously had recent ED admission on 7/1/2022 for abdominal pain + odorous vaginal discharge. Abdominal pain treated with OTC tylenol + ibuprofen PRN, no abx prescribed on discharge. At the time, STI workup was performed which was (-) for gonorrhea or clamydia infection. At the time, her labs wer notable for HCG- 7.6 which was downtrending since D+C    At the ED, VS were stable, T: 35.6C,  BP: 143/79, HR: 66bpm, RR: 22 on 99% RA. Labs currently remarkable for AG of 16, lactate of 7.6. CT Abd+Pelvis with IV contrast performed showing enlarged liver (20cm), small calcified gallstones, right flank subcutaneous hematoma, and fat containing abdominal hernia. Otherwise, no bowel obstruction, or surrounding inflammatory changes. Patient received a 3L IVF bolus, 1x 1g tylenol, bentyl x1, famotidine x1, toradol x1, and multiple IV anti-emetics. Lactate improved to 1.5. Patient was admitted to the hospital for intractable abdominal pain with nausea and vomiting.  25yo F hx of 8 abortions (most recent D+C in 6/2022), syphillis, chylydia infection presenting to the hospital for acute abdominal pain x 1 day. Prior to pain, the patient had 2 Four Lok[employer] alcohol beverages last night. Ate steak and hot dogs as well last night (stated hot dogs may be old?). Also smoked 3 marijuana blunts yesterday (usually smokes 3). Abdominal pain started this morning AFTER waking up feeling nauseous requiring emesis (non-bloody and non-bilious) x8. Described as sharp, 10/10, diffuse, no radiation. This pain was associated with chills, nausea, vomiting, and diarrhea (non-bloody). Denies chest pain, shortness of breath, dysuria. Last menstrual period: ~ 1 month ago. Last sexual intercourse: ~ 3 weeks ago. No sick contacts.     Previously had recent ED admission on 7/1/2022 for abdominal pain + odorous vaginal discharge. Abdominal pain treated with OTC tylenol + ibuprofen PRN, no abx prescribed on discharge. At the time, STI workup was performed which was (-) for gonorrhea or clamydia infection. At the time, her labs wer notable for HCG- 7.6 which was downtrending since D+C    At the ED, VS were stable, T: 35.6C,  BP: 143/79, HR: 66bpm, RR: 22 on 99% RA. Labs currently remarkable for AG of 16, lactate of 7.6. CT Abd+Pelvis with IV contrast performed showing enlarged liver (20cm), small calcified gallstones, right flank subcutaneous hematoma, and fat containing abdominal hernia. Otherwise, no bowel obstruction, or surrounding inflammatory changes. Patient received a 3L IVF bolus, 1x 1g tylenol, bentyl x1, famotidine x1, toradol x1, and multiple IV anti-emetics. Lactate improved to 1.5. Patient was admitted to the hospital for intractable abdominal pain with nausea and vomiting.  27yo F hx of 8 abortions (most recent D+C in 6/2022), syphillis treated in 2012, chylydia infection presenting to the hospital for acute abdominal pain x 1 day. Prior to pain, the patient had 2 Four Lok[employer] alcohol beverages last night. Ate steak and hot dogs as well last night (stated hot dogs may be old?). Also smoked 3 marijuana blunts yesterday (usually smokes 3). Abdominal pain started this morning AFTER waking up feeling nauseous requiring emesis (non-bloody and non-bilious) x8. Described as sharp, 10/10, diffuse, no radiation. This pain was associated with chills, nausea, vomiting, and diarrhea (non-bloody). Denies chest pain, shortness of breath, dysuria. Last menstrual period: ~ 1 month ago. Last sexual intercourse: ~ 3 weeks ago. No sick contacts.     Previously had recent ED admission on 7/1/2022 for abdominal pain + odorous vaginal discharge. Abdominal pain treated with OTC tylenol + ibuprofen PRN, no abx prescribed on discharge. At the time, STI workup was performed which was (-) for gonorrhea or clamydia infection. At the time, her labs wer notable for HCG- 7.6 which was downtrending since D+C    At the ED, VS were stable, T: 35.6C,  BP: 143/79, HR: 66bpm, RR: 22 on 99% RA. Labs currently remarkable for AG of 16, lactate of 7.6. CT Abd+Pelvis with IV contrast performed showing enlarged liver (20cm), small calcified gallstones, right flank subcutaneous hematoma, and fat containing abdominal hernia. Otherwise, no bowel obstruction, or surrounding inflammatory changes. Patient received a 3L IVF bolus, 1x 1g tylenol, bentyl x1, famotidine x1, toradol x1, and multiple IV anti-emetics. Lactate improved to 1.5. Patient was admitted to the hospital for intractable abdominal pain with nausea and vomiting.  25yo F hx of 8 abortions (most recent D+C in 6/2022), syphillis treated in 2012, chylydia infection presenting to the hospital for acute abdominal pain x 1 day. Prior to pain, the patient had 2 Four Lok[employer] alcohol beverages last night. Ate steak and hot dogs as well last night (stated hot dogs may be old?). Also smoked 3 marijuana blunts yesterday (usually smokes 3). Abdominal pain started this morning AFTER waking up feeling nauseous requiring emesis (non-bloody and non-bilious) x8. Described as sharp, 10/10, diffuse, no radiation. This pain was associated with chills, nausea, vomiting, and diarrhea (non-bloody). Denies chest pain, shortness of breath, dysuria sx. Last menstrual period: ~ 1 month ago. Last sexual intercourse: ~ 3 weeks ago. No sick contacts.     Previously had recent ED admission on 7/1/2022 for abdominal pain + odorous vaginal discharge. Abdominal pain treated with OTC tylenol + ibuprofen PRN, no abx prescribed on discharge. At the time, STI workup was performed which was (-) for gonorrhea or clamydia infection. At the time, her labs wer notable for HCG- 7.6 which was downtrending since D+C    At the ED, VS were stable, T: 35.6C,  BP: 143/79, HR: 66bpm, RR: 22 on 99% RA. Labs currently remarkable for AG of 16, lactate of 7.6. CT Abd+Pelvis with IV contrast performed showing enlarged liver (20cm), small calcified gallstones, right flank subcutaneous hematoma, and fat containing abdominal hernia. Otherwise, no bowel obstruction, or surrounding inflammatory changes. Patient received a 3L IVF bolus, 1x 1g tylenol, bentyl x1, famotidine x1, toradol x1, and multiple IV anti-emetics. Lactate improved to 1.5. Patient was admitted to the hospital for intractable abdominal pain with nausea and vomiting.

## 2022-07-27 NOTE — ED PROVIDER NOTE - CLINICAL SUMMARY MEDICAL DECISION MAKING FREE TEXT BOX
Pt with intractable nausea and vomiting despite multiple doses of antiemetics, labs and imaging reassuring, will admit to med

## 2022-07-27 NOTE — H&P ADULT - NSHPPHYSICALEXAM_GEN_ALL_CORE
PHYSICAL EXAM:  GENERAL: NAD, lying in bed comfortably  HEAD:  Atraumatic, Normocephalic  EYES: EOMI, PERRLA, conjunctiva and sclera clear  ENT: Moist mucous membranes  NECK: Supple, No JVD  CHEST/LUNG: Clear to auscultation bilaterally; No rales, rhonchi, wheezing, or rubs. Unlabored respirations  HEART: Regular rate and rhythm; No murmurs, rubs, or gallops  ABDOMEN: Bowel sounds present; Soft, tender to palpation diffusely but most tender on epigastric area, no obvious abdominal hernia, (-) Murillo's sign, no flank tenderness  EXTREMITIES:  2+ Peripheral Pulses, brisk capillary refill. No clubbing, cyanosis, or edema  NERVOUS SYSTEM:  Alert & Oriented X3, speech clear. No deficits   MSK: FROM all 4 extremities, full and equal strength  SKIN: No rashes or lesions

## 2022-07-27 NOTE — H&P ADULT - NSHPLABSRESULTS_GEN_ALL_CORE
LABS:  cret                        13.6   4.47  )-----------( 282      ( 27 Jul 2022 09:59 )             41.8     07-27    140  |  102  |  14  ----------------------------<  125<H>  4.6   |  22  |  0.8    Ca    9.4      27 Jul 2022 09:59    TPro  8.2<H>  /  Alb  4.6  /  TBili  0.3  /  DBili  x   /  AST  30  /  ALT  22  /  AlkPhos  29<L>  07-27      < from: CT Abdomen and Pelvis w/ IV Cont (07.27.22 @ 11:56) >    FINDINGS:    LOWER CHEST: Unremarkable.    HEPATOBILIARY: The liver is enlarged measuring 20 cm in length. Small   calcified gallstones within the gallbladder..    SPLEEN: Unremarkable.    PANCREAS: Unremarkable.    ADRENAL GLANDS: Unremarkable.    KIDNEYS: Unremarkable.    ABDOMINOPELVIC NODES: Unremarkable.    PELVIC ORGANS: Unremarkable.    PERITONEUM/MESENTERY/BOWEL: There is no bowel obstruction. Previously   noted colonic wall thickening is difficult to evaluate due to collapsed   colon. No surrounding inflammatory changes..    BONES/SOFT TISSUES: Right flank subcutaneous hematoma. Osseous structures   are intact..    OTHER: Abdominal aorta is normal in caliber. Fat-containing abdominal   hernia.      IMPRESSION: Evaluation of the colon limited due to lack of oral contrast   and portions of the colon being collapsed. No surrounding inflammatory   changes or bowel obstruction.    Cholelithiasis.    < end of copied text >

## 2022-07-27 NOTE — H&P ADULT - ASSESSMENT
25yo F hx of previous abortions x8, syphillis and chlamydia infection treated presenting to the hospital with acute abdominal pain associated with intractable nausea/vomiting    IMPRESSION  #Abdominal pain  #Umbilical hernia  #Cholelithiasis  #Lactic acidosis- improving    PLAN   25yo F hx of previous abortions x8, syphillis and chlamydia infection treated presenting to the hospital with acute abdominal pain associated with intractable nausea/vomiting    IMPRESSION  #Abdominal pain with nausea/vomiting, diarrhea  #Umbilical hernia  #Cholelithiasis  #Lactic acidosis- improving  #Right flank subcutaneous hematoma      PLAN    #Acute Abdominal pain with intractable nausea/vomiting, diarrhea: DDx includes gastritis vs gastroenteritis vs Cannabinoid hyperemesis syndrome. Pain most likely caused by vomiting  #Lactic acidosis- resolved  - CT A+P shows no colitis, bowel obstruction, pancreatitis. there is cholelithiasis, hematoma, abdominal hernia  - cont IV fluids  - anti-emetics and anti-pain meds PRN to treat pain and nausea  - clear liquid diet, observe if patient can advance diet tomorrow. If patient cannot be advanced tomorrow, may need G consult for evaluation  - check lipase  - check GI PCR for diarrhea  - cont PPI  - doubt pregnant (patient is on OCP and compliant. HCG was downtrending)    #Right flank subcutaneous hematoma  - monitor    #Cholelithiasis  - may benefit from cholecystectomy in the future    #DVT ppx: lovenox 40 QD  #GI ppx: protonix 40 QD  #DIet: clear liquid  #Dispo: acute. pending resolution of nausea/vomiting   27yo F hx of previous abortions x8, syphillis and chlamydia infection treated presenting to the hospital with acute abdominal pain associated with intractable nausea/vomiting    IMPRESSION  #Abdominal pain with nausea/vomiting, diarrhea  #Umbilical hernia  #Cholelithiasis  #Lactic acidosis- improving  #Right flank subcutaneous hematoma      PLAN    #Acute Abdominal pain with intractable nausea/vomiting, diarrhea: DDx includes gastritis vs gastroenteritis vs Cannabinoid hyperemesis syndrome. Pain most likely caused by vomiting vs food poisoning  #Lactic acidosis- resolved  - CT A+P shows no colitis, bowel obstruction, pancreatitis. there is cholelithiasis, hematoma, abdominal hernia  - cont IV fluids  - anti-emetics and anti-pain meds PRN to treat pain and nausea  - clear liquid diet, observe if patient can advance diet tomorrow. If patient cannot be advanced tomorrow, may need G consult for evaluation  - check lipase  - check GI PCR for diarrhea  - cont PPI  - doubt pregnant (patient is on OCP and compliant. HCG was downtrending)    #Right flank subcutaneous hematoma  - monitor    #Cholelithiasis  - may benefit from cholecystectomy in the future    #DVT ppx: lovenox 40 QD  #GI ppx: protonix 40 QD  #DIet: clear liquid  #Dispo: acute. pending resolution of nausea/vomiting   25yo F hx of previous abortions x8, syphillis and chlamydia infection treated presenting to the hospital with acute abdominal pain after having intractable nausea/vomiting    IMPRESSION  #Abdominal pain with nausea/vomiting, diarrhea  #Umbilical hernia  #Cholelithiasis  #Lactic acidosis- improving  #Right flank subcutaneous hematoma      PLAN    #Acute Abdominal pain with intractable nausea/vomiting, diarrhea: DDx includes gastritis vs gastroenteritis vs Cannabinoid hyperemesis syndrome. Pain most likely caused by vomiting vs food poisoning  #Lactic acidosis- resolved  - CT A+P shows no colitis, bowel obstruction, pancreatitis. there is cholelithiasis, hematoma, abdominal hernia  - cont IV fluids  - anti-emetics and anti-pain meds PRN to treat pain and nausea  - clear liquid diet, observe if patient can advance diet tomorrow. If patient cannot be advanced tomorrow, may need G consult for evaluation  - check lipase  - check GI PCR for diarrhea  - cont PPI  - doubt pregnant (patient is on OCP and compliant. HCG was downtrending)  - counselled decreasing marijuana intake    #Right flank subcutaneous hematoma  - monitor    #Cholelithiasis  - may benefit from cholecystectomy in the future    #DVT ppx: lovenox 40 QD  #GI ppx: protonix 40 QD  #DIet: clear liquid  #Dispo: acute. pending resolution of nausea/vomiting

## 2022-07-28 LAB
ALBUMIN SERPL ELPH-MCNC: 3.8 G/DL — SIGNIFICANT CHANGE UP (ref 3.5–5.2)
ALP SERPL-CCNC: 30 U/L — SIGNIFICANT CHANGE UP (ref 30–115)
ALT FLD-CCNC: 14 U/L — SIGNIFICANT CHANGE UP (ref 0–41)
ANION GAP SERPL CALC-SCNC: 12 MMOL/L — SIGNIFICANT CHANGE UP (ref 7–14)
AST SERPL-CCNC: 15 U/L — SIGNIFICANT CHANGE UP (ref 0–41)
BASOPHILS # BLD AUTO: 0.04 K/UL — SIGNIFICANT CHANGE UP (ref 0–0.2)
BASOPHILS NFR BLD AUTO: 0.5 % — SIGNIFICANT CHANGE UP (ref 0–1)
BILIRUB SERPL-MCNC: 0.5 MG/DL — SIGNIFICANT CHANGE UP (ref 0.2–1.2)
BUN SERPL-MCNC: 14 MG/DL — SIGNIFICANT CHANGE UP (ref 10–20)
CALCIUM SERPL-MCNC: 8.7 MG/DL — SIGNIFICANT CHANGE UP (ref 8.5–10.1)
CHLORIDE SERPL-SCNC: 103 MMOL/L — SIGNIFICANT CHANGE UP (ref 98–110)
CO2 SERPL-SCNC: 23 MMOL/L — SIGNIFICANT CHANGE UP (ref 17–32)
CREAT SERPL-MCNC: 0.7 MG/DL — SIGNIFICANT CHANGE UP (ref 0.7–1.5)
EGFR: 121 ML/MIN/1.73M2 — SIGNIFICANT CHANGE UP
EOSINOPHIL # BLD AUTO: 0.02 K/UL — SIGNIFICANT CHANGE UP (ref 0–0.7)
EOSINOPHIL NFR BLD AUTO: 0.3 % — SIGNIFICANT CHANGE UP (ref 0–8)
GLUCOSE SERPL-MCNC: 88 MG/DL — SIGNIFICANT CHANGE UP (ref 70–99)
HCG SERPL-ACNC: <0.6 MIU/ML — SIGNIFICANT CHANGE UP
HCT VFR BLD CALC: 38.3 % — SIGNIFICANT CHANGE UP (ref 37–47)
HGB BLD-MCNC: 12.6 G/DL — SIGNIFICANT CHANGE UP (ref 12–16)
IMM GRANULOCYTES NFR BLD AUTO: 0.3 % — SIGNIFICANT CHANGE UP (ref 0.1–0.3)
LIDOCAIN IGE QN: 13 U/L — SIGNIFICANT CHANGE UP (ref 7–60)
LIDOCAIN IGE QN: 16 U/L — SIGNIFICANT CHANGE UP (ref 7–60)
LYMPHOCYTES # BLD AUTO: 2.64 K/UL — SIGNIFICANT CHANGE UP (ref 1.2–3.4)
LYMPHOCYTES # BLD AUTO: 33.6 % — SIGNIFICANT CHANGE UP (ref 20.5–51.1)
MAGNESIUM SERPL-MCNC: 2 MG/DL — SIGNIFICANT CHANGE UP (ref 1.8–2.4)
MCHC RBC-ENTMCNC: 27.9 PG — SIGNIFICANT CHANGE UP (ref 27–31)
MCHC RBC-ENTMCNC: 32.9 G/DL — SIGNIFICANT CHANGE UP (ref 32–37)
MCV RBC AUTO: 84.7 FL — SIGNIFICANT CHANGE UP (ref 81–99)
MONOCYTES # BLD AUTO: 0.7 K/UL — HIGH (ref 0.1–0.6)
MONOCYTES NFR BLD AUTO: 8.9 % — SIGNIFICANT CHANGE UP (ref 1.7–9.3)
NEUTROPHILS # BLD AUTO: 4.43 K/UL — SIGNIFICANT CHANGE UP (ref 1.4–6.5)
NEUTROPHILS NFR BLD AUTO: 56.4 % — SIGNIFICANT CHANGE UP (ref 42.2–75.2)
NRBC # BLD: 0 /100 WBCS — SIGNIFICANT CHANGE UP (ref 0–0)
PLATELET # BLD AUTO: 245 K/UL — SIGNIFICANT CHANGE UP (ref 130–400)
POTASSIUM SERPL-MCNC: 3.8 MMOL/L — SIGNIFICANT CHANGE UP (ref 3.5–5)
POTASSIUM SERPL-SCNC: 3.8 MMOL/L — SIGNIFICANT CHANGE UP (ref 3.5–5)
PROT SERPL-MCNC: 6.3 G/DL — SIGNIFICANT CHANGE UP (ref 6–8)
RBC # BLD: 4.52 M/UL — SIGNIFICANT CHANGE UP (ref 4.2–5.4)
RBC # FLD: 14 % — SIGNIFICANT CHANGE UP (ref 11.5–14.5)
SODIUM SERPL-SCNC: 138 MMOL/L — SIGNIFICANT CHANGE UP (ref 135–146)
WBC # BLD: 7.85 K/UL — SIGNIFICANT CHANGE UP (ref 4.8–10.8)
WBC # FLD AUTO: 7.85 K/UL — SIGNIFICANT CHANGE UP (ref 4.8–10.8)

## 2022-07-28 PROCEDURE — 99223 1ST HOSP IP/OBS HIGH 75: CPT

## 2022-07-28 RX ORDER — ACETAMINOPHEN 500 MG
650 TABLET ORAL EVERY 8 HOURS
Refills: 0 | Status: DISCONTINUED | OUTPATIENT
Start: 2022-07-28 | End: 2022-07-29

## 2022-07-28 RX ADMIN — Medication 975 MILLIGRAM(S): at 23:58

## 2022-07-28 RX ADMIN — PANTOPRAZOLE SODIUM 40 MILLIGRAM(S): 20 TABLET, DELAYED RELEASE ORAL at 06:09

## 2022-07-28 NOTE — PATIENT PROFILE ADULT - FALL HARM RISK - UNIVERSAL INTERVENTIONS
Bed in lowest position, wheels locked, appropriate side rails in place/Call bell, personal items and telephone in reach/Instruct patient to call for assistance before getting out of bed or chair/Non-slip footwear when patient is out of bed/[place] to call system/Physically safe environment - no spills, clutter or unnecessary equipment/Purposeful Proactive Rounding/Room/bathroom lighting operational, light cord in reach

## 2022-07-28 NOTE — PROGRESS NOTE ADULT - SUBJECTIVE AND OBJECTIVE BOX
WALKER COHEN 26y Female  MRN#: [id number]   CODE STATUS: Full      SUBJECTIVE  Patient is a 26y old Female who presents with a chief complaint of abdominal pain (27 Jul 2022 16:47)  Currently admitted to medicine with the primary diagnosis of Intractable nausea and vomiting    Today is hospital day 1d, and this morning patient      OBJECTIVE  PAST MEDICAL & SURGICAL HISTORY  Chlamydia  ADHD  Trichomonal infection  History of syphilis treated  History of surgery  TERMINATION  2017,2015, 2012    ALLERGIES:  No Known Allergies    MEDICATIONS:  STANDING MEDICATIONS  enoxaparin Injectable 40 milliGRAM(s) SubCutaneous every 24 hours  lactated ringers. 1000 milliLiter(s) IV Continuous <Continuous>  pantoprazole    Tablet 40 milliGRAM(s) Oral before breakfast  sodium chloride 0.9% Bolus 1000 milliLiter(s) IV Bolus once    PRN MEDICATIONS  acetaminophen     Tablet .. 975 milliGRAM(s) Oral every 8 hours PRN  morphine  - Injectable 2 milliGRAM(s) IV Push once PRN  ondansetron Injectable 4 milliGRAM(s) IV Push every 8 hours PRN      VITAL SIGNS: Last 24 Hours  T(C): 37.2 (28 Jul 2022 03:03), Max: 37.3 (27 Jul 2022 21:31)  T(F): 99 (28 Jul 2022 03:03), Max: 99.1 (27 Jul 2022 21:31)  HR: 56 (28 Jul 2022 03:03) (50 - 66)  BP: 101/55 (28 Jul 2022 03:03) (101/55 - 143/79)  BP(mean): --  RR: 18 (28 Jul 2022 03:03) (17 - 22)  SpO2: 100% (28 Jul 2022 03:03) (98% - 100%)    LABS:                        13.6   4.47  )-----------( 282      ( 27 Jul 2022 09:59 )             41.8     07-27    140  |  102  |  14  ----------------------------<  125<H>  4.6   |  22  |  0.8    Ca    9.4      27 Jul 2022 09:59    TPro  8.2<H>  /  Alb  4.6  /  TBili  0.3  /  DBili  x   /  AST  30  /  ALT  22  /  AlkPhos  29<L>  07-27      Lactate, Blood: 4.9 mmol/L *HH* (07-27-22 @ 09:59)          RADIOLOGY:  CT Abdomen and Pelvis w/ IV Cont (07.27.22 @ 11:56) >  Evaluation of the colon limited due to lack of oral contrast   and portions of the colon being collapsed. No surrounding inflammatory   changes or bowel obstruction.      PHYSICAL EXAM:  GENERAL: NAD, well-developed, AAOx3  HEENT:  Atraumatic, Normocephalic. EOMI, PERRLA, conjunctiva and sclera clear, No JVD  PULMONARY: Clear to auscultation bilaterally; No wheeze  CARDIOVASCULAR: Regular rate and rhythm; No murmurs, rubs, or gallops  GASTROINTESTINAL: Soft, Nontender, Nondistended; Bowel sounds present  MUSCULOSKELETAL:  2+ Peripheral Pulses, No clubbing, cyanosis, or edema  NEUROLOGY: non-focal  SKIN: No rashes or lesions      ADMISSION SUMMARY  Patient is a 26y old Female who presents with a chief complaint of abdominal pain (27 Jul 2022 16:47)  Currently admitted to medicine with the primary diagnosis of Intractable nausea and vomiting      ASSESSMENT & PLAN  25yo F hx of previous abortions x8, syphillis and chlamydia infection treated presenting to the hospital with acute abdominal pain after having intractable nausea/vomiting    IMPRESSION  #Abdominal pain with nausea/vomiting, diarrhea  #Umbilical hernia  #Cholelithiasis  #Lactic acidosis- improving  #Right flank subcutaneous hematoma      PLAN    #Acute Abdominal pain with intractable nausea/vomiting, diarrhea: DDx includes gastritis vs gastroenteritis vs Cannabinoid hyperemesis syndrome. Pain most likely caused by vomiting vs food poisoning  #Lactic acidosis- resolved  - CT A+P shows no colitis, bowel obstruction, pancreatitis. there is cholelithiasis, hematoma, abdominal hernia  - cont IV fluids  - anti-emetics and anti-pain meds PRN to treat pain and nausea  - clear liquid diet, observe if patient can advance diet tomorrow. If patient cannot be advanced tomorrow, may need G consult for evaluation  - check lipase  - check GI PCR for diarrhea  - cont PPI  - doubt pregnant (patient is on OCP and compliant. HCG was downtrending)  - counselled decreasing marijuana intake    #Right flank subcutaneous hematoma  - monitor    #Cholelithiasis  - may benefit from cholecystectomy in the future    #DVT ppx: lovenox 40 QD  #GI ppx: protonix 40 QD  #DIet: clear liquid  #Dispo: acute. pending resolution of nausea/vomiting   WALKER COHEN 26y Female  MRN#: [id number]   CODE STATUS: Full      SUBJECTIVE  Patient is a 26y old Female who presents with a chief complaint of abdominal pain (27 Jul 2022 16:47)  Currently admitted to medicine with the primary diagnosis of Intractable nausea and vomiting    Today is hospital day 1d, and this morning patient appeared well on exam. She reported mild nausea and abdominal pain that was significantly less painful than on admittance. Patient reported no vomitus since yesterday afternoon. Patient also reported an episode of foul smelling vaginal discharge two days prior. Also reported left thigh pain. No other complaints and no acute events overnight.    OBJECTIVE  PAST MEDICAL & SURGICAL HISTORY  Chlamydia  ADHD  Trichomonal infection  History of syphilis treated  History of surgery    ALLERGIES:  No Known Allergies    MEDICATIONS:  STANDING MEDICATIONS  enoxaparin Injectable 40 milliGRAM(s) SubCutaneous every 24 hours  lactated ringers. 1000 milliLiter(s) IV Continuous <Continuous>  pantoprazole    Tablet 40 milliGRAM(s) Oral before breakfast  sodium chloride 0.9% Bolus 1000 milliLiter(s) IV Bolus once    PRN MEDICATIONS  acetaminophen     Tablet .. 975 milliGRAM(s) Oral every 8 hours PRN  morphine  - Injectable 2 milliGRAM(s) IV Push once PRN  ondansetron Injectable 4 milliGRAM(s) IV Push every 8 hours PRN      VITAL SIGNS: Last 24 Hours  T(C): 37.2 (28 Jul 2022 03:03), Max: 37.3 (27 Jul 2022 21:31)  T(F): 99 (28 Jul 2022 03:03), Max: 99.1 (27 Jul 2022 21:31)  HR: 56 (28 Jul 2022 03:03) (50 - 66)  BP: 101/55 (28 Jul 2022 03:03) (101/55 - 143/79)  RR: 18 (28 Jul 2022 03:03) (17 - 22)  SpO2: 100% (28 Jul 2022 03:03) (98% - 100%)    LABS:                        13.6   4.47  )-----------( 282      ( 27 Jul 2022 09:59 )             41.8     07-27    140  |  102  |  14  ----------------------------<  125<H>  4.6   |  22  |  0.8    Ca    9.4      27 Jul 2022 09:59    TPro  8.2<H>  /  Alb  4.6  /  TBili  0.3  /  DBili  x   /  AST  30  /  ALT  22  /  AlkPhos  29<L>  07-27      Lactate, Blood: 4.9 mmol/L *HH* (07-27-22 @ 09:59)          RADIOLOGY:  CT Abdomen and Pelvis w/ IV Cont (07.27.22 @ 11:56) >  Evaluation of the colon limited due to lack of oral contrast   and portions of the colon being collapsed. No surrounding inflammatory   changes or bowel obstruction.      PHYSICAL EXAM:  GENERAL: NAD, well-developed, AAOx3  HEENT:  Atraumatic, Normocephalic. EOMI  PULMONARY: Clear to auscultation bilaterally; No wheeze  CARDIOVASCULAR: Regular rate and rhythm; No murmurs, rubs, or gallops  GASTROINTESTINAL: Soft, Nondistended; Bowel sounds present, mildly tender to palpation in Upper R and L quadrants  MUSCULOSKELETAL:  2+ Peripheral Pulses, No clubbing, cyanosis, or edema  NEUROLOGY: non-focal, movements normal  SKIN: No rashes or lesions      ADMISSION SUMMARY  Patient is a 26y old Female who presents with a chief complaint of abdominal pain (27 Jul 2022 16:47)  Currently admitted to medicine with the primary diagnosis of Intractable nausea and vomiting      ASSESSMENT & PLAN  27yo F hx of previous abortions x8, syphillis and chlamydia infection treated presenting to the hospital with acute abdominal pain after having intractable nausea/vomiting    IMPRESSION  #Abdominal pain with nausea/vomiting, diarrhea  #Umbilical hernia  #Cholelithiasis  #Lactic acidosis- improving  #Right flank subcutaneous hematoma      PLAN    #Acute Abdominal pain with intractable nausea/vomiting, diarrhea: DDx includes gastritis vs gastroenteritis vs Cannabinoid hyperemesis syndrome. Pain most likely caused by vomiting vs food poisoning  #Lactic acidosis- resolved  - CT A+P shows no colitis, bowel obstruction, pancreatitis. there is cholelithiasis, hematoma, abdominal hernia  - cont IV fluids  - anti-emetics and anti-pain meds PRN to treat pain and nausea  - clear liquid diet, observe if patient can advance diet tomorrow. If patient cannot be advanced tomorrow, may need G consult for evaluation  - check GI PCR for diarrhea  - cont PPI  - doubt pregnant (patient is on OCP and compliant. HCG was downtrending)  - counselled decreasing marijuana intake    #Right flank subcutaneous hematoma  - monitor    #Cholelithiasis  - may benefit from cholecystectomy in the future    #DVT ppx: lovenox 40 QD  #GI ppx: protonix 40 QD  #DIet: clear liquid  #Dispo: acute. pending resolution of nausea/vomiting

## 2022-07-29 ENCOUNTER — TRANSCRIPTION ENCOUNTER (OUTPATIENT)
Age: 27
End: 2022-07-29

## 2022-07-29 VITALS
RESPIRATION RATE: 18 BRPM | DIASTOLIC BLOOD PRESSURE: 62 MMHG | SYSTOLIC BLOOD PRESSURE: 128 MMHG | TEMPERATURE: 96 F | HEART RATE: 56 BPM

## 2022-07-29 LAB
ALBUMIN SERPL ELPH-MCNC: 4 G/DL — SIGNIFICANT CHANGE UP (ref 3.5–5.2)
ALP SERPL-CCNC: 30 U/L — SIGNIFICANT CHANGE UP (ref 30–115)
ALT FLD-CCNC: 12 U/L — SIGNIFICANT CHANGE UP (ref 0–41)
ANION GAP SERPL CALC-SCNC: 12 MMOL/L — SIGNIFICANT CHANGE UP (ref 7–14)
APPEARANCE UR: ABNORMAL
APTT BLD: 36.3 SEC — SIGNIFICANT CHANGE UP (ref 27–39.2)
AST SERPL-CCNC: 15 U/L — SIGNIFICANT CHANGE UP (ref 0–41)
BACTERIA # UR AUTO: ABNORMAL
BASOPHILS # BLD AUTO: 0 K/UL — SIGNIFICANT CHANGE UP (ref 0–0.2)
BASOPHILS NFR BLD AUTO: 0 % — SIGNIFICANT CHANGE UP (ref 0–1)
BILIRUB SERPL-MCNC: 0.6 MG/DL — SIGNIFICANT CHANGE UP (ref 0.2–1.2)
BILIRUB UR-MCNC: NEGATIVE — SIGNIFICANT CHANGE UP
BUN SERPL-MCNC: 15 MG/DL — SIGNIFICANT CHANGE UP (ref 10–20)
CALCIUM SERPL-MCNC: 8.8 MG/DL — SIGNIFICANT CHANGE UP (ref 8.5–10.1)
CHLORIDE SERPL-SCNC: 104 MMOL/L — SIGNIFICANT CHANGE UP (ref 98–110)
CO2 SERPL-SCNC: 24 MMOL/L — SIGNIFICANT CHANGE UP (ref 17–32)
COLOR SPEC: YELLOW — SIGNIFICANT CHANGE UP
CREAT SERPL-MCNC: 0.9 MG/DL — SIGNIFICANT CHANGE UP (ref 0.7–1.5)
DIFF PNL FLD: NEGATIVE — SIGNIFICANT CHANGE UP
EGFR: 90 ML/MIN/1.73M2 — SIGNIFICANT CHANGE UP
EOSINOPHIL # BLD AUTO: 0.11 K/UL — SIGNIFICANT CHANGE UP (ref 0–0.7)
EOSINOPHIL NFR BLD AUTO: 2.6 % — SIGNIFICANT CHANGE UP (ref 0–8)
EPI CELLS # UR: 26 /HPF — HIGH (ref 0–5)
GLUCOSE SERPL-MCNC: 93 MG/DL — SIGNIFICANT CHANGE UP (ref 70–99)
GLUCOSE UR QL: NEGATIVE — SIGNIFICANT CHANGE UP
HCT VFR BLD CALC: 40.1 % — SIGNIFICANT CHANGE UP (ref 37–47)
HGB BLD-MCNC: 13 G/DL — SIGNIFICANT CHANGE UP (ref 12–16)
HYALINE CASTS # UR AUTO: 10 /LPF — HIGH (ref 0–7)
INR BLD: 0.99 RATIO — SIGNIFICANT CHANGE UP (ref 0.65–1.3)
KETONES UR-MCNC: ABNORMAL
LEUKOCYTE ESTERASE UR-ACNC: NEGATIVE — SIGNIFICANT CHANGE UP
LYMPHOCYTES # BLD AUTO: 2.04 K/UL — SIGNIFICANT CHANGE UP (ref 1.2–3.4)
LYMPHOCYTES # BLD AUTO: 49.1 % — SIGNIFICANT CHANGE UP (ref 20.5–51.1)
MAGNESIUM SERPL-MCNC: 1.8 MG/DL — SIGNIFICANT CHANGE UP (ref 1.8–2.4)
MANUAL SMEAR VERIFICATION: SIGNIFICANT CHANGE UP
MCHC RBC-ENTMCNC: 27.4 PG — SIGNIFICANT CHANGE UP (ref 27–31)
MCHC RBC-ENTMCNC: 32.4 G/DL — SIGNIFICANT CHANGE UP (ref 32–37)
MCV RBC AUTO: 84.4 FL — SIGNIFICANT CHANGE UP (ref 81–99)
MONOCYTES # BLD AUTO: 0.33 K/UL — SIGNIFICANT CHANGE UP (ref 0.1–0.6)
MONOCYTES NFR BLD AUTO: 7.9 % — SIGNIFICANT CHANGE UP (ref 1.7–9.3)
NEUTROPHILS # BLD AUTO: 1.68 K/UL — SIGNIFICANT CHANGE UP (ref 1.4–6.5)
NEUTROPHILS NFR BLD AUTO: 40.4 % — LOW (ref 42.2–75.2)
NITRITE UR-MCNC: NEGATIVE — SIGNIFICANT CHANGE UP
PH UR: 5.5 — SIGNIFICANT CHANGE UP (ref 5–8)
PLAT MORPH BLD: NORMAL — SIGNIFICANT CHANGE UP
PLATELET # BLD AUTO: 232 K/UL — SIGNIFICANT CHANGE UP (ref 130–400)
POTASSIUM SERPL-MCNC: 3.9 MMOL/L — SIGNIFICANT CHANGE UP (ref 3.5–5)
POTASSIUM SERPL-SCNC: 3.9 MMOL/L — SIGNIFICANT CHANGE UP (ref 3.5–5)
PROT SERPL-MCNC: 6.5 G/DL — SIGNIFICANT CHANGE UP (ref 6–8)
PROT UR-MCNC: SIGNIFICANT CHANGE UP
PROTHROM AB SERPL-ACNC: 11.4 SEC — SIGNIFICANT CHANGE UP (ref 9.95–12.87)
RBC # BLD: 4.75 M/UL — SIGNIFICANT CHANGE UP (ref 4.2–5.4)
RBC # FLD: 13.7 % — SIGNIFICANT CHANGE UP (ref 11.5–14.5)
RBC BLD AUTO: NORMAL — SIGNIFICANT CHANGE UP
RBC CASTS # UR COMP ASSIST: 1 /HPF — SIGNIFICANT CHANGE UP (ref 0–4)
SMUDGE CELLS # BLD: PRESENT — SIGNIFICANT CHANGE UP
SODIUM SERPL-SCNC: 140 MMOL/L — SIGNIFICANT CHANGE UP (ref 135–146)
SP GR SPEC: 1.02 — SIGNIFICANT CHANGE UP (ref 1.01–1.03)
UROBILINOGEN FLD QL: SIGNIFICANT CHANGE UP
WBC # BLD: 4.15 K/UL — LOW (ref 4.8–10.8)
WBC # FLD AUTO: 4.15 K/UL — LOW (ref 4.8–10.8)
WBC UR QL: 5 /HPF — SIGNIFICANT CHANGE UP (ref 0–5)

## 2022-07-29 PROCEDURE — 93010 ELECTROCARDIOGRAM REPORT: CPT

## 2022-07-29 PROCEDURE — 99239 HOSP IP/OBS DSCHRG MGMT >30: CPT

## 2022-07-29 PROCEDURE — 99221 1ST HOSP IP/OBS SF/LOW 40: CPT

## 2022-07-29 RX ADMIN — PANTOPRAZOLE SODIUM 40 MILLIGRAM(S): 20 TABLET, DELAYED RELEASE ORAL at 06:00

## 2022-07-29 NOTE — DISCHARGE NOTE NURSING/CASE MANAGEMENT/SOCIAL WORK - PATIENT PORTAL LINK FT
You can access the FollowMyHealth Patient Portal offered by [facility] by registering at the following website: [email or web address]. By joining [facility] FollowMyHealth portal, you will also be able to view your health information using other applications (apps) compatible with our system.

## 2022-07-29 NOTE — DISCHARGE NOTE PROVIDER - NSDCCPCAREPLAN_GEN_ALL_CORE_FT
PRINCIPAL DISCHARGE DIAGNOSIS  Diagnosis: Intractable nausea and vomiting  Assessment and Plan of Treatment: Patient came in for multiple episodes of vomiting and nausea. These episodes have recently stopped and patient has no problems eating, drinking, or using the restroom. This episode of vomiting may have been caused by excess marijuana use and reducing future use may help prevent repeat episodes. Follow up with Primary Care Doctor in 2 weeks.      SECONDARY DISCHARGE DIAGNOSES  Diagnosis: Abdominal pain  Assessment and Plan of Treatment: Patient came in with diffuse abdominal that improved since she was admitted. CT scan found an enlarged liver, small gallstones, a collection of blood in the right side of the abdomen, and an abdominal hernia. Follow up with a GI doctor in 2 weeks to further address these findings.     PRINCIPAL DISCHARGE DIAGNOSIS  Diagnosis: Intractable nausea and vomiting  Assessment and Plan of Treatment: Patient came in for multiple episodes of vomiting and nausea. These episodes have recently stopped and patient has no problems eating, drinking, or using the restroom. This episode of vomiting may have been caused by excess marijuana use and reducing future use may help prevent repeat episodes. Follow up with your Primary Care Doctor in 2 weeks. If you do not have one, then the name of one has been provided in this discharge paperwork.      SECONDARY DISCHARGE DIAGNOSES  Diagnosis: Abdominal pain  Assessment and Plan of Treatment: Patient came in with diffuse abdominal that improved since she was admitted. CT scan found an enlarged liver, small gallstones, a collection of blood in the right side of the abdomen, and an abdominal hernia. Follow up with a GI doctor in 2 weeks to further address these findings.     PRINCIPAL DISCHARGE DIAGNOSIS  Diagnosis: Intractable nausea and vomiting  Assessment and Plan of Treatment: Patient came in for multiple episodes of vomiting and nausea. These episodes have recently stopped and patient has no problems eating, drinking, or using the restroom. This episode of vomiting may have been caused by excess marijuana use and reducing future use may help prevent repeat episodes. Follow up with your Primary Care Doctor in 2 weeks. If you do not have one, then the name of one has been provided in this discharge paperwork.      SECONDARY DISCHARGE DIAGNOSES  Diagnosis: Abdominal pain  Assessment and Plan of Treatment: Patient came in with diffuse abdominal that improved since she was admitted. CT scan found an enlarged liver, small gallstones, a collection of blood in the right side of the abdomen, and an abdominal hernia. Follow up with a GI doctor in 2 weeks to further address these findings.    Diagnosis: Vaginitis  Assessment and Plan of Treatment: During your hospitalization, you had malodorous discharge. You were evaluated by the OB/GYN and determined to have vaginitis. Several swabs were taken to rule out infectious etiologies. Please follow up as an outpatient with the OB/GYN team.

## 2022-07-29 NOTE — DISCHARGE NOTE NURSING/CASE MANAGEMENT/SOCIAL WORK - NSDCVIVACCINE_GEN_ALL_CORE_FT
Tdap; 05-Jun-2019 12:27; Karoline Alcala (BRAD); [employer]; [id number] (Exp. Date: 14-Mar-2021); IntraMuscular; Deltoid Left.; 0.5 milliLiter(s); VIS (VIS Published: 09-May-2013, VIS Presented: 05-Jun-2019);

## 2022-07-29 NOTE — DISCHARGE NOTE PROVIDER - PROVIDER TOKENS
PROVIDER:[TOKEN:[[id number]],FOLLOWUP:[Routine]] PROVIDER:[TOKEN:[[id number]],FOLLOWUP:[Routine]],PROVIDER:[TOKEN:[[id number]],FOLLOWUP:[2 weeks],ESTABLISHEDPATIENT:[T]]

## 2022-07-29 NOTE — DISCHARGE NOTE PROVIDER - CARE PROVIDER_API CALL
Lesli Solo)  Internal Medicine  [address], [address]st [place] [address]  Phone: [phone]  Fax: [phone]  Follow Up Time: Routine   Lesli Solo)  Internal Medicine  [address] [facility], [address]st [place] [address]  Phone: [phone]  Fax: [phone]  Follow Up Time: Routine    Vicente Sorensen)  Obstetrics and Gynecology  [address] [place] [address]  Phone: [phone]  Fax: [phone]  Established Patient  Follow Up Time: 2 weeks

## 2022-07-29 NOTE — DISCHARGE NOTE PROVIDER - CARE PROVIDERS DIRECT ADDRESSES
,[email or web address]

## 2022-07-29 NOTE — DISCHARGE NOTE PROVIDER - HOSPITAL COURSE
27yo F hx of 8 abortions (most recent D+C in 6/2022), syphillis treated in 2012, chylydia infection presenting to the hospital for acute abdominal pain x 1 day. Prior to pain, the patient had 2 [employer] alcohol beverages last night. Ate steak and hot dogs as well last night (stated hot dogs may be old?). Also smoked 3 marijuana blunts yesterday (usually smokes 3). Abdominal pain started this morning AFTER waking up feeling nauseous requiring emesis (non-bloody and non-bilious) x8. Described as sharp, 10/10, diffuse, no radiation. This pain was associated with chills, nausea, vomiting, and diarrhea (non-bloody). Denies chest pain, shortness of breath, dysuria sx. Last menstrual period: ~ 1 month ago. Last sexual intercourse: ~ 3 weeks ago. No sick contacts.     Previously had recent ED admission on 7/1/2022 for abdominal pain + odorous vaginal discharge. Abdominal pain treated with OTC tylenol + ibuprofen PRN, no abx prescribed on discharge. At the time, STI workup was performed which was (-) for gonorrhea or clamydia infection. At the time, her labs wer notable for HCG- 7.6 which was downtrending since D+C    At the ED, VS were stable, T: 35.6C,  BP: 143/79, HR: 66bpm, RR: 22 on 99% RA. Labs currently remarkable for AG of 16, lactate of 7.6. CT Abd+Pelvis with IV contrast performed showing enlarged liver (20cm), small calcified gallstones, right flank subcutaneous hematoma, and fat containing abdominal hernia. Otherwise, no bowel obstruction, or surrounding inflammatory changes. Patient received a 3L IVF bolus, 1x 1g tylenol, bentyl x1, famotidine x1, toradol x1, and multiple IV anti-emetics. Lactate improved to 1.5. Patient was admitted to the hospital for intractable abdominal pain with nausea and vomiting.     Since admission to floor 4B, patient has had no episodes of vomitus and has tolerated a regular diet. Patient was advised to decrease marijuana usage. Patient reported odorous smelling discharge the day prior to admission. Patient was seen by OBGYN consult. Patient should also follow up with outpatient GI for possible cholelithiasis and R flank subcutaneous hematoma. 27yo F hx of 8 abortions (most recent D+C in 6/2022), syphillis treated in 2012, chylydia infection presenting to the hospital for acute abdominal pain x 1 day. Prior to pain, the patient had 2 [employer] alcohol beverages last night. Ate steak and hot dogs as well last night (stated hot dogs may be old?). Also smoked 3 marijuana blunts yesterday (usually smokes 3). Abdominal pain started this morning AFTER waking up feeling nauseous requiring emesis (non-bloody and non-bilious) x8. Described as sharp, 10/10, diffuse, no radiation. This pain was associated with chills, nausea, vomiting, and diarrhea (non-bloody). Denies chest pain, shortness of breath, dysuria sx. Last menstrual period: ~ 1 month ago. Last sexual intercourse: ~ 3 weeks ago. No sick contacts.     Previously had recent ED admission on 7/1/2022 for abdominal pain + odorous vaginal discharge. Abdominal pain treated with OTC tylenol + ibuprofen PRN, no abx prescribed on discharge. At the time, STI workup was performed which was (-) for gonorrhea or clamydia infection. At the time, her labs wer notable for HCG- 7.6 which was downtrending since D+C    At the ED, VS were stable, T: 35.6C,  BP: 143/79, HR: 66bpm, RR: 22 on 99% RA. Labs currently remarkable for AG of 16, lactate of 7.6. CT Abd+Pelvis with IV contrast performed showing enlarged liver (20cm), small calcified gallstones, right flank subcutaneous hematoma, and fat containing abdominal hernia. Otherwise, no bowel obstruction, or surrounding inflammatory changes. Patient received a 3L IVF bolus, 1x 1g tylenol, bentyl x1, famotidine x1, toradol x1, and multiple IV anti-emetics. Lactate improved to 1.5. Patient was admitted to the hospital for intractable abdominal pain with nausea and vomiting.     Since admission to floor 4B, patient has had no episodes of vomitus and has tolerated a regular diet. Patient was advised to decrease marijuana usage. Patient reported odorous smelling discharge the day prior to admission. Patient was seen by OBGYN for malodorous discharge, but is to follow up outpatient with them for further w/u. Patient should also follow up with outpatient GI for possible cholelithiasis and R flank subcutaneous hematoma. 25yo F hx of 8 abortions (most recent D+C in 6/2022), syphillis treated in 2012, chylydia infection presenting to the hospital for acute abdominal pain x 1 day. Prior to pain, the patient had 2 [employer] alcohol beverages last night. Ate steak and hot dogs as well last night (stated hot dogs may be old?). Also smoked 3 marijuana blunts yesterday (usually smokes 3). Abdominal pain started this morning AFTER waking up feeling nauseous requiring emesis (non-bloody and non-bilious) x8. Described as sharp, 10/10, diffuse, no radiation. This pain was associated with chills, nausea, vomiting, and diarrhea (non-bloody). Denies chest pain, shortness of breath, dysuria sx. Last menstrual period: ~ 1 month ago. Last sexual intercourse: ~ 3 weeks ago. No sick contacts.     Previously had recent ED admission on 7/1/2022 for abdominal pain + odorous vaginal discharge. Abdominal pain treated with OTC tylenol + ibuprofen PRN, no abx prescribed on discharge. At the time, STI workup was performed which was (-) for gonorrhea or clamydia infection. At the time, her labs wer notable for HCG- 7.6 which was downtrending since D+C    At the ED, VS were stable, T: 35.6C,  BP: 143/79, HR: 66bpm, RR: 22 on 99% RA. Labs currently remarkable for AG of 16, lactate of 7.6. CT Abd+Pelvis with IV contrast performed showing enlarged liver (20cm), small calcified gallstones, right flank subcutaneous hematoma, and fat containing abdominal hernia. Otherwise, no bowel obstruction, or surrounding inflammatory changes. Patient received a 3L IVF bolus, 1x 1g tylenol, bentyl x1, famotidine x1, toradol x1, and multiple IV anti-emetics. Lactate improved to 1.5. Patient was admitted to the hospital for intractable abdominal pain with nausea and vomiting.     Since admission to floor 4B, patient has had no episodes of vomitus and has tolerated a regular diet. Patient was advised to decrease marijuana usage. Patient reported odorous smelling discharge the day prior to admission. Patient was seen by OBGYN for malodorous discharge, but is to follow up outpatient with them for further w/u. Patient should also follow up with outpatient GI for possible cholelithiasis and R flank subcutaneous hematoma.    Attending Attestation:  Patient was seen & examined independently. At least 10 systems were reviewed in ROS. All systems reviewed  are within normal limits. Latest vital signs and labs were reviewed today. Case was discussed with house staff in morning rounds for assessment and plan.  Patient is medically stable for discharge . About 32 mins spent on discharge disposition.

## 2022-07-29 NOTE — CONSULT NOTE ADULT - ASSESSMENT
27yo  with LMP 07/ with PMH of chlamydia, gonorrhea and syphillis with TOCs, now with vaginal discharge r/o BV, otherwise doing well.    -f/u vaginitis  -recommend follow-up with Dr. Price for annual exam  -tylenol and motrin for pain  -can continue OCPs for birth control, recommend condom use for STD/STI prevention  -further management per primary team    Dr. Bo and Dr. Melendrez to be made aware.    INCOMPLETE NOTE  27yo  with LMP 07/ with remote history of chlamydia, gonorrhea and syphillis, now with vaginal discharge likely secondary to BV, otherwise clinically stable.    - f/u vaginitis, will treat pending results  - recommend follow-up with Dr. Price for annual exam  - tylenol and motrin for pain  - can continue OCPs for birth control, recommend condom use for STD/STI prevention  - further management per primary team    Dr. Bo and Dr. Miller aware.

## 2022-07-29 NOTE — DISCHARGE NOTE NURSING/CASE MANAGEMENT/SOCIAL WORK - NSDCPEFALRISK_GEN_ALL_CORE
For information on Fall & Injury Prevention, visit: [email or web address] OR  [email or web address] OR  https://www.cdc.gov/steadi/patient.html

## 2022-07-29 NOTE — PROGRESS NOTE ADULT - SUBJECTIVE AND OBJECTIVE BOX
WALKER COHEN 26y Female  MRN#: [id number]   CODE STATUS: Full    SUBJECTIVE  Patient is a 26y old Female who presents with a chief complaint of abdominal pain (27 Jul 2022 16:47)  Currently admitted to medicine with the primary diagnosis of Intractable nausea and vomiting    Today is hospital day 2d, and this morning patient appeared well on exam. She reported no nausea or vomiting since yesterday and tolerated regular diet well. She did report left thigh pain at night. No other complaints. Last night, patient's heart rate dropped to a level of 47.     OBJECTIVE  PAST MEDICAL & SURGICAL HISTORY  Chlamydia  ADHD  Trichomonal infection  History of syphilis treated  History of surgery    ALLERGIES:  No Known Allergies    MEDICATIONS:  STANDING MEDICATIONS  enoxaparin Injectable 40 milliGRAM(s) SubCutaneous every 24 hours  lactated ringers. 1000 milliLiter(s) IV Continuous <Continuous>  pantoprazole    Tablet 40 milliGRAM(s) Oral before breakfast  sodium chloride 0.9% Bolus 1000 milliLiter(s) IV Bolus once    PRN MEDICATIONS  acetaminophen     Tablet .. 975 milliGRAM(s) Oral every 8 hours PRN  morphine  - Injectable 2 milliGRAM(s) IV Push once PRN  ondansetron Injectable 4 milliGRAM(s) IV Push every 8 hours PRN      VITAL SIGNS: Last 24 Hours  Vital Signs Last 24 Hrs  T(C): 36.6 (29 Jul 2022 07:26), Max: 36.6 (28 Jul 2022 23:39)  T(F): 97.9 (29 Jul 2022 07:26), Max: 97.9 (29 Jul 2022 07:26)  HR: 62 (29 Jul 2022 07:26) (45 - 62)  BP: 120/75 (29 Jul 2022 07:26) (119/59 - 124/54)  RR: 18 (29 Jul 2022 07:26) (18 - 18)  SpO2: 100% (29 Jul 2022 07:26) (100% - 100%)    Parameters below as of 29 Jul 2022 07:26  Patient On (Oxygen Delivery Method): room air    LABS:      Lactate, Blood: 4.9 mmol/L *HH* (07-27-22 @ 09:59)          RADIOLOGY:  CT Abdomen and Pelvis w/ IV Cont (07.27.22 @ 11:56) >  Evaluation of the colon limited due to lack of oral contrast   and portions of the colon being collapsed. No surrounding inflammatory   changes or bowel obstruction.      PHYSICAL EXAM:  GENERAL: NAD, well-developed, AAOx3  HEENT:  Atraumatic, Normocephalic. EOMI  PULMONARY: Clear to auscultation bilaterally; No wheeze  CARDIOVASCULAR: Regular rate and rhythm; No murmurs, rubs, or gallops  GASTROINTESTINAL: Soft, Nondistended; Bowel sounds present, mildly tender to palpation in Left upper and lower quadrants  MUSCULOSKELETAL:  2+ Peripheral Pulses, No clubbing, cyanosis, or edema  NEUROLOGY: non-focal, movements normal  SKIN: No rashes or lesions      ADMISSION SUMMARY  Patient is a 26y old Female who presents with a chief complaint of abdominal pain (27 Jul 2022 16:47)  Currently admitted to medicine with the primary diagnosis of Intractable nausea and vomiting      ASSESSMENT & PLAN  27yo F hx of previous abortions x8, syphillis and chlamydia infection treated presenting to the hospital with acute abdominal pain after having intractable nausea/vomiting    #Acute Abdominal pain with intractable nausea/vomiting  #Lactic acidosis- resolved  - CT A+P shows no colitis, bowel obstruction, pancreatitis. there is cholelithiasis, hematoma, abdominal hernia  - anti-emetics and anti-pain meds PRN to treat pain and nausea  - Patient tolerated regular diet well, no more nausea or vomiting  - C/w PPI    #Odorous Vaginal Discharge  - OB-GYN consulted    #Right flank subcutaneous hematoma  - monitor    #Cholelithiasis  - may benefit from cholecystectomy in the future    #DVT ppx: lovenox 40 QD  #GI ppx: protonix 40 QD  #DIet: clear liquid  #Dispo: acute. WALKER COHEN 26y Female  MRN#: [id number]   CODE STATUS: Full    SUBJECTIVE  Patient is a 26y old Female who presents with a chief complaint of abdominal pain (27 Jul 2022 16:47)  Currently admitted to medicine with the primary diagnosis of Intractable nausea and vomiting    Today is hospital day 2d, and this morning patient appeared well on exam. She reported no nausea or vomiting since yesterday and tolerated regular diet well. She did report left thigh pain at night. No other complaints. Last night, patient's heart rate dropped to a level of 47.     OBJECTIVE  PAST MEDICAL & SURGICAL HISTORY  Chlamydia  ADHD  Trichomonal infection  History of syphilis treated  History of surgery    ALLERGIES:  No Known Allergies    MEDICATIONS:  STANDING MEDICATIONS  enoxaparin Injectable 40 milliGRAM(s) SubCutaneous every 24 hours  lactated ringers. 1000 milliLiter(s) IV Continuous <Continuous>  pantoprazole    Tablet 40 milliGRAM(s) Oral before breakfast  sodium chloride 0.9% Bolus 1000 milliLiter(s) IV Bolus once    PRN MEDICATIONS  acetaminophen     Tablet .. 975 milliGRAM(s) Oral every 8 hours PRN  morphine  - Injectable 2 milliGRAM(s) IV Push once PRN  ondansetron Injectable 4 milliGRAM(s) IV Push every 8 hours PRN      VITAL SIGNS: Last 24 Hours  Vital Signs Last 24 Hrs  T(C): 36.6 (29 Jul 2022 07:26), Max: 36.6 (28 Jul 2022 23:39)  T(F): 97.9 (29 Jul 2022 07:26), Max: 97.9 (29 Jul 2022 07:26)  HR: 62 (29 Jul 2022 07:26) (45 - 62)  BP: 120/75 (29 Jul 2022 07:26) (119/59 - 124/54)  RR: 18 (29 Jul 2022 07:26) (18 - 18)  SpO2: 100% (29 Jul 2022 07:26) (100% - 100%)    Parameters below as of 29 Jul 2022 07:26  Patient On (Oxygen Delivery Method): room air    LABS:  07-28    138  |  103  |  14  ----------------------------<  88  3.8   |  23  |  0.7    Ca    8.7      28 Jul 2022 08:00  Mg     2.0     07-28    TPro  6.3  /  Alb  3.8  /  TBili  0.5  /  DBili  x   /  AST  15  /  ALT  14  /  AlkPhos  30  07-28    Blood Gas Venous - Lactate: 1.50 mmol/L (07.27.22 @ 13:02)                         12.6   7.85  )-----------( 245      ( 28 Jul 2022 08:00 )             38.3         RADIOLOGY:  CT Abdomen and Pelvis w/ IV Cont (07.27.22 @ 11:56) >  Evaluation of the colon limited due to lack of oral contrast   and portions of the colon being collapsed. No surrounding inflammatory   changes or bowel obstruction.      PHYSICAL EXAM:  GENERAL: NAD, well-developed, AAOx3  HEENT:  Atraumatic, Normocephalic. EOMI  PULMONARY: Clear to auscultation bilaterally; No wheeze  CARDIOVASCULAR: Regular rate and rhythm; No murmurs, rubs, or gallops  GASTROINTESTINAL: Soft, Nondistended; Bowel sounds present, mildly tender to palpation in Left upper and lower quadrants  MUSCULOSKELETAL:  2+ Peripheral Pulses, No clubbing, cyanosis, or edema  NEUROLOGY: non-focal, movements normal  SKIN: No rashes or lesions      ADMISSION SUMMARY  Patient is a 26y old Female who presents with a chief complaint of abdominal pain (27 Jul 2022 16:47)  Currently admitted to medicine with the primary diagnosis of Intractable nausea and vomiting      ASSESSMENT & PLAN  25yo F hx of previous abortions x8, syphillis and chlamydia infection treated presenting to the hospital with acute abdominal pain after having intractable nausea/vomiting    #Acute Abdominal pain with intractable nausea/vomiting, likely secondary to hyperemesis associated with cannabis use  #Lactic acidosis- resolved  - CT A+P shows no colitis, bowel obstruction, pancreatitis. there is cholelithiasis, hematoma, abdominal hernia  - anti-emetics and anti-pain meds PRN to treat pain and nausea  - Patient tolerated regular diet well, no more nausea or vomiting  - C/w PPI    # Bradycardia  - EKG (07.29.22 @ 00:24) >  Diagnosis Line Marked sinus bradycardia  T wave abnormality, consider lateral ischemia  - PT/INR ordered    #Odorous Vaginal Discharge  - OB-GYN consulted  - RPR level reordered    #Right flank subcutaneous hematoma  - monitor  - Follow up with GI as outpatient    #Cholelithiasis  - may benefit from cholecystectomy in the future    #DVT ppx: lovenox 40 QD  #GI ppx: protonix 40 QD  #DIet: clear liquid  #Dispo: acute.

## 2022-07-29 NOTE — CONSULT NOTE ADULT - SUBJECTIVE AND OBJECTIVE BOX
PGY 2 Note    Chief Complaint: vaginal discharge    HPI: 25yo  with LMP  presents today for vaginal discharge for the past two weeks. She states the discharge is yellow and smells like fish. Per patient, she had a d&c 6/3 for pregnancy termination with Dr. Blanco. She has a PMH of chlamydia, gonorrhea or syphillis with TOCs. Patient is currently on OCPs for birth control. Currently admitted to medicine for abdominal pain which has since resolved. Denies fevers, chills, SOB, CP, dysuria or urinary urgency. Patient follows with Dr. Price for gyn care.      Ob/Gyn History:  , c/s for arrest @ 7cm, FT, [id number]                 LMP -                   Cycle Length - regular    Denies history of ovarian cysts, uterine fibroids, abnormal paps.  Hx of chlamydia, gonorrhea and syphillis 5 years ago with TOCs.    No Known Allergies  PAST MEDICAL & SURGICAL HISTORY:  Chlamydia  ADHD  Trichomonal infection  History of syphilis  treated    History of surgery  d&c x7    FAMILY HISTORY:  Family history of breast cancer (Mother)  Family history of diabetes mellitus (DM) (Father)    SOCIAL HISTORY: Denies cigarette use, endorses social alcohol use, marijuana smoking every day    Vital Signs Last 24 Hrs  T(F): 96.5 (2022 15:36), Max: 97.9 (2022 07:26)  HR: 56 (2022 15:36) (47 - 62)  BP: 128/62 (2022 15:36) (120/75 - 128/62)  RR: 18 (2022 15:36) (18 - 18)    General Appearance - AAOx3, NAD  Heart - S1S2 regular rate and rhythm  Lung - CTA Bilaterally  Abdomen - Soft, nontender, nondistended, no rebound, no rigidity, no guarding, bowel sounds present    GYN/Pelvis:    Labia Majora - Normal  Labia Minora - Normal  Clitoris - Normal  Urethra - Normal  Vagina - Normal. physiological discharge noted  Cervix - Normal. no lesions    Uterus:  Size - Normal  Tenderness - None  Mass - None  Freely mobile    Adnexa:  Masses - None  Tenderness - None    LABS:                        13.0   4.15  )-----------( 232      ( 2022 07:31 )             40.1     HCG Quantitative, Serum: <0.6 mIU/mL (22 @ 01:32)          140  |  104  |  15  ----------------------------<  93  3.9   |  24  |  0.9    Ca    8.8      2022 07:31  Mg     1.8         TPro  6.5  /  Alb  4.0  /  TBili  0.6  /  DBili  x   /  AST  15  /  ALT  12  /  AlkPhos  30      PT/INR - ( 2022 12:04 )   PT: 11.40 sec;   INR: 0.99 ratio         PTT - ( 2022 12:04 )  PTT:36.3 sec  Urinalysis Basic - ( 2022 10:05 )    Color: Yellow / Appearance: Slightly Turbid / S.022 / pH: x  Gluc: x / Ketone: Small  / Bili: Negative / Urobili: <2 mg/dL   Blood: x / Protein: Trace / Nitrite: Negative   Leuk Esterase: Negative / RBC: 1 /HPF / WBC 5 /HPF   Sq Epi: x / Non Sq Epi: 26 /HPF / Bacteria: Few    RADIOLOGY & ADDITIONAL STUDIES:  no pertinent PGY 2 Note    Chief Complaint: vaginal discharge    HPI: 27yo  with LMP  presented on  for abdominal pain.  GYN consulted for vaginal discharge for the past two weeks. She states the discharge is yellow and smells like fish. Per patient, she had a d&c on 6/3 for pregnancy termination at 8w3d with Dr. Blanco. She has a PMH of chlamydia, gonorrhea and syphillis with negative TOCs approx 5 years ago. Patient is currently on OCPs for birth control. Currently admitted to medicine for abdominal pain which has since resolved. Denies fevers, chills, SOB, CP, dysuria or urinary urgency. Patient follows with Dr. Price for gyn care.      Ob/Gyn History:  , c/s for arrest @ 7cm, FT, [id number]                 LMP -     Denies history of ovarian cysts, uterine fibroids, abnormal paps.  Hx of chlamydia, gonorrhea and syphillis 5 years ago with TOCs.    No Known Allergies  PAST MEDICAL & SURGICAL HISTORY:  Chlamydia  ADHD  Trichomonal infection  History of syphilis  treated    History of surgery  d&c x7    FAMILY HISTORY:  Family history of breast cancer (Mother)  Family history of diabetes mellitus (DM) (Father)    SOCIAL HISTORY: Denies cigarette use, endorses social alcohol use, marijuana smoking every day    Vital Signs Last 24 Hrs  T(F): 96.5 (2022 15:36), Max: 97.9 (2022 07:26)  HR: 56 (2022 15:36) (47 - 62)  BP: 128/62 (2022 15:36) (120/75 - 128/62)  RR: 18 (2022 15:36) (18 - 18)    General Appearance - AAOx3, NAD  Heart - S1S2 regular rate and rhythm  Lung - CTA Bilaterally  Abdomen - Soft, nontender, nondistended, no rebound, no rigidity, no guarding, bowel sounds present    GYN/Pelvis:    Labia Majora - Normal  Labia Minora - Normal  Clitoris - Normal  Urethra - Normal  Vagina - Normal. physiological discharge noted  Cervix - Normal. no lesions    Uterus:  Size - Normal, 5wk sized  Tenderness - None  Mass - None  Freely mobile    Adnexa:  Masses - None  Tenderness - None    LABS:                        13.0   4.15  )-----------( 232      ( 2022 07:31 )             40.1     HCG Quantitative, Serum: <0.6 mIU/mL (22 @ 01:32)          140  |  104  |  15  ----------------------------<  93  3.9   |  24  |  0.9    Ca    8.8      2022 07:31  Mg     1.8         TPro  6.5  /  Alb  4.0  /  TBili  0.6  /  DBili  x   /  AST  15  /  ALT  12  /  AlkPhos  30      PT/INR - ( 2022 12:04 )   PT: 11.40 sec;   INR: 0.99 ratio         PTT - ( 2022 12:04 )  PTT:36.3 sec  Urinalysis Basic - ( 2022 10:05 )    Color: Yellow / Appearance: Slightly Turbid / S.022 / pH: x  Gluc: x / Ketone: Small  / Bili: Negative / Urobili: <2 mg/dL   Blood: x / Protein: Trace / Nitrite: Negative   Leuk Esterase: Negative / RBC: 1 /HPF / WBC 5 /HPF   Sq Epi: x / Non Sq Epi: 26 /HPF / Bacteria: Few    RADIOLOGY & ADDITIONAL STUDIES:  none

## 2022-07-30 LAB
C TRACH RRNA SPEC QL NAA+PROBE: SIGNIFICANT CHANGE UP
C TRACH RRNA SPEC QL NAA+PROBE: SIGNIFICANT CHANGE UP
N GONORRHOEA RRNA SPEC QL NAA+PROBE: SIGNIFICANT CHANGE UP
N GONORRHOEA RRNA SPEC QL NAA+PROBE: SIGNIFICANT CHANGE UP
SPECIMEN SOURCE: SIGNIFICANT CHANGE UP
T PALLIDUM AB TITR SER: NEGATIVE — SIGNIFICANT CHANGE UP

## 2022-07-31 NOTE — ED POST DISCHARGE NOTE - RESULT SUMMARY
[facility] INFORMED ME VIA EMAIL THAT PATIENT TELE WILL NOT ACCEPT CALLS. UNABLE TO MAKE CLINIC APPT.

## 2022-08-02 DIAGNOSIS — R00.1 BRADYCARDIA, UNSPECIFIED: ICD-10-CM

## 2022-08-02 DIAGNOSIS — R11.2 NAUSEA WITH VOMITING, UNSPECIFIED: ICD-10-CM

## 2022-08-02 DIAGNOSIS — N76.0 ACUTE VAGINITIS: ICD-10-CM

## 2022-08-02 DIAGNOSIS — R16.0 HEPATOMEGALY, NOT ELSEWHERE CLASSIFIED: ICD-10-CM

## 2022-08-02 DIAGNOSIS — R10.9 UNSPECIFIED ABDOMINAL PAIN: ICD-10-CM

## 2022-08-02 DIAGNOSIS — Z83.3 FAMILY HISTORY OF DIABETES MELLITUS: ICD-10-CM

## 2022-08-02 DIAGNOSIS — K42.9 UMBILICAL HERNIA WITHOUT OBSTRUCTION OR GANGRENE: ICD-10-CM

## 2022-08-02 DIAGNOSIS — F10.10 ALCOHOL ABUSE, UNCOMPLICATED: ICD-10-CM

## 2022-08-02 DIAGNOSIS — E87.2 ACIDOSIS: ICD-10-CM

## 2022-08-02 DIAGNOSIS — M79.81 NONTRAUMATIC HEMATOMA OF SOFT TISSUE: ICD-10-CM

## 2022-08-02 DIAGNOSIS — F90.9 ATTENTION-DEFICIT HYPERACTIVITY DISORDER, UNSPECIFIED TYPE: ICD-10-CM

## 2022-08-02 DIAGNOSIS — F12.10 CANNABIS ABUSE, UNCOMPLICATED: ICD-10-CM

## 2022-08-02 DIAGNOSIS — K80.20 CALCULUS OF GALLBLADDER WITHOUT CHOLECYSTITIS WITHOUT OBSTRUCTION: ICD-10-CM

## 2022-08-03 RX ORDER — METRONIDAZOLE 7.5 MG/G
0.75 GEL VAGINAL
Qty: 1 | Refills: 0 | Status: ACTIVE | COMMUNITY
Start: 2022-08-03 | End: 1900-01-01

## 2022-10-04 NOTE — ED PROVIDER NOTE - NSFOLLOWUPINSTRUCTIONS_ED_ALL_ED_FT
Colitis    Colitis is inflammation of the colon. Colitis may last a short time (acute) or it may last a long time (chronic).     CAUSES  This condition may be caused by:    Viruses.  Bacteria.  Reactions to medicine.  Certain autoimmune diseases, such as Crohn disease or ulcerative colitis.    SYMPTOMS  Symptoms of this condition include:    Diarrhea.  Passing bloody or tarry stool.  Pain.  Fever.  Vomiting.  Tiredness (fatigue).  Weight loss.  Bloating.  Sudden increase in abdominal pain.  Having fewer bowel movements than usual.    DIAGNOSIS  This condition is diagnosed with a stool test or a blood test. You may also have other tests, including X-rays, a CT scan, or a colonoscopy.    TREATMENT  Treatment may include:    Resting the bowel. This involves not eating or drinking for a period of time.  Fluids that are given through an IV tube.  Medicine for pain and diarrhea.  Antibiotic medicines.  Surgery.    HOME CARE INSTRUCTIONS  Eating and Drinking    Follow instructions from your health care provider about eating or drinking restrictions.  Drink enough fluid to keep your urine clear or pale yellow.  Work with a dietitian to determine which foods cause your condition to flare up.  Avoid foods that cause flare-ups.  Eat a well-balanced diet.    Medicines    Take over-the-counter and prescription medicines only as told by your health care provider.  If you were prescribed an antibiotic medicine, take it as told by your health care provider. Do not stop taking the antibiotic even if you start to feel better.    General Instructions    Keep all follow-up visits as told by your health care provider. This is important.    SEEK MEDICAL CARE IF:  Your symptoms do not go away.  You develop new symptoms.    SEEK IMMEDIATE MEDICAL CARE IF:  You have a fever that does not go away with treatment.  You develop chills.  You have extreme weakness, fainting, or dehydration.  You have repeated vomiting.  You develop severe pain in your abdomen.  You pass bloody or tarry stool.    ADDITIONAL NOTES AND INSTRUCTIONS    Please follow up with your Primary MD in 24-48 hr.  Seek immediate medical care for any new/worsening signs or symptoms. Universal Safety Interventions

## 2022-10-17 ENCOUNTER — APPOINTMENT (OUTPATIENT)
Dept: OBGYN | Facility: CLINIC | Age: 27
End: 2022-10-17

## 2022-11-20 NOTE — PATIENT PROFILE ADULT - DO YOU FEEL LIKE HURTING YOURSELF OR OTHERS?
[facility]        NAME: Nini Anton is a 11 y o  female  : 2007    MRN: [id number]  DATE: 2022  TIME: 3:41 PM    Assessment and Plan   Acute sinusitis, recurrence not specified, unspecified location [J01 90]  1  Acute sinusitis, recurrence not specified, unspecified location  amoxicillin-clavulanate (AUGMENTIN) 875-125 mg per tablet    DISCONTINUED: benzonatate (TESSALON) 200 MG capsule            Patient Instructions     Augmentin as prescribed (take with food)  Over the counter cold medication as needed (EX: flonase, etc )  Warm compresses over sinuses  Steam treatment (practice proper safety precautions when handing hot liquids/steam)  Over the counter saline nasal spray   Follow up with PCP in 3-5 days  Proceed to  ER if symptoms worsen  Eat yogurt with live and active cultures and/or take a probiotic at least 3 hours before or after antibiotic dose  Monitor stool for diarrhea and/or blood  If this occurs, contact primary care doctor ASAP  Chief Complaint     Chief Complaint   Patient presents with   • Cold Like Symptoms     Nasal congestion, pressure and pain to ears, body aches, symptoms started 2 weeks ago, pt  Treating with otc meds with no relief          History of Present Illness       URI   This is a new problem  Episode onset: 2 weeks  The problem has been gradually worsening  Associated symptoms include congestion, coughing (mild), ear pain (B/L ears) and sinus pain  Pertinent negatives include no abdominal pain, diarrhea, headaches, nausea, rash, rhinorrhea, sneezing, sore throat, vomiting or wheezing  Treatments tried: OTC cold  Review of Systems   Review of Systems   Constitutional: Positive for fever (last night; subjective)  Negative for chills and fatigue  HENT: Positive for congestion, ear pain (B/L ears), sinus pressure and sinus pain  Negative for ear discharge, postnasal drip, rhinorrhea, sneezing, sore throat and trouble swallowing  Respiratory: Positive for cough (mild)  Negative for chest tightness, shortness of breath and wheezing  Gastrointestinal: Negative for abdominal pain, diarrhea, nausea and vomiting  Musculoskeletal: Positive for myalgias  Skin: Negative for rash  Neurological: Negative for light-headedness and headaches  Current Medications       Current Outpatient Medications:   •  amoxicillin-clavulanate (AUGMENTIN) 875-125 mg per tablet, Take 1 tablet by mouth every 12 (twelve) hours for 7 days, Disp: 14 tablet, Rfl: 0    Current Allergies     Allergies as of 11/20/2022   • (No Known Allergies)            The following portions of the patient's history were reviewed and updated as appropriate: allergies, current medications, past family history, past medical history, past social history, past surgical history and problem list      Past Medical History:   Diagnosis Date   • Asthma        History reviewed  No pertinent surgical history  Family History   Problem Relation Age of Onset   • Hyperlipidemia Mother    • Arthritis Mother    • Hypertension Father    • Wilm's tumor Brother    • Diabetes Maternal Grandmother    • Heart disease Maternal Grandmother    • Diabetes Maternal Grandfather    • Heart disease Maternal Grandfather          Medications have been verified  Objective   Pulse (!) 103   Temp 99 1 °F (37 3 °C)   Resp (!) 20   Ht 5' 3" (1 6 m)   Wt 102 kg (224 lb 9 6 oz)   LMP 10/20/2022   SpO2 99%   BMI 39 79 kg/m²   Patient's last menstrual period was 10/20/2022  Physical Exam     Physical Exam  Vitals reviewed  Constitutional:       General: She is not in acute distress  Appearance: She is well-developed and well-nourished  She is not diaphoretic  HENT:      Head: Normocephalic        Right Ear: Tympanic membrane and external ear normal       Left Ear: Tympanic membrane and external ear normal       Nose: Nose normal       Mouth/Throat:      Mouth: Oropharynx is clear and moist       Pharynx: No oropharyngeal exudate or posterior oropharyngeal erythema  Eyes:      Conjunctiva/sclera: Conjunctivae normal    Cardiovascular:      Rate and Rhythm: Normal rate and regular rhythm  Pulses: Intact distal pulses  Heart sounds: Normal heart sounds  No murmur heard  No friction rub  No gallop  Pulmonary:      Effort: Pulmonary effort is normal  No respiratory distress  Breath sounds: Normal breath sounds  No wheezing or rales  Chest:      Chest wall: No tenderness  Lymphadenopathy:      Cervical: No cervical adenopathy  Skin:     General: Skin is warm  Neurological:      Mental Status: She is alert and oriented to person, place, and time  Psychiatric:         Mood and Affect: Mood and affect normal          Behavior: Behavior normal          Thought Content:  Thought content normal          Judgment: Judgment normal  no

## 2023-01-25 ENCOUNTER — EMERGENCY (EMERGENCY)
Facility: HOSPITAL | Age: 28
LOS: 0 days | Discharge: HOME | End: 2023-01-26
Attending: STUDENT IN AN ORGANIZED HEALTH CARE EDUCATION/TRAINING PROGRAM | Admitting: STUDENT IN AN ORGANIZED HEALTH CARE EDUCATION/TRAINING PROGRAM
Payer: MEDICAID

## 2023-01-25 VITALS
RESPIRATION RATE: 18 BRPM | OXYGEN SATURATION: 100 % | HEART RATE: 80 BPM | TEMPERATURE: 98 F | SYSTOLIC BLOOD PRESSURE: 128 MMHG | DIASTOLIC BLOOD PRESSURE: 70 MMHG

## 2023-01-25 DIAGNOSIS — R11.2 NAUSEA WITH VOMITING, UNSPECIFIED: ICD-10-CM

## 2023-01-25 DIAGNOSIS — R10.13 EPIGASTRIC PAIN: ICD-10-CM

## 2023-01-25 DIAGNOSIS — O03.9 COMPLETE OR UNSPECIFIED SPONTANEOUS ABORTION WITHOUT COMPLICATION: Chronic | ICD-10-CM

## 2023-01-25 DIAGNOSIS — Z98.890 OTHER SPECIFIED POSTPROCEDURAL STATES: Chronic | ICD-10-CM

## 2023-01-25 DIAGNOSIS — R10.9 UNSPECIFIED ABDOMINAL PAIN: ICD-10-CM

## 2023-01-25 DIAGNOSIS — F90.9 ATTENTION-DEFICIT HYPERACTIVITY DISORDER, UNSPECIFIED TYPE: ICD-10-CM

## 2023-01-25 LAB
ALBUMIN SERPL ELPH-MCNC: 4.8 G/DL — SIGNIFICANT CHANGE UP (ref 3.5–5.2)
ALP SERPL-CCNC: 43 U/L — SIGNIFICANT CHANGE UP (ref 30–115)
ALT FLD-CCNC: 16 U/L — SIGNIFICANT CHANGE UP (ref 0–41)
ANION GAP SERPL CALC-SCNC: 12 MMOL/L — SIGNIFICANT CHANGE UP (ref 7–14)
AST SERPL-CCNC: 26 U/L — SIGNIFICANT CHANGE UP (ref 0–41)
BASOPHILS # BLD AUTO: 0.04 K/UL — SIGNIFICANT CHANGE UP (ref 0–0.2)
BASOPHILS NFR BLD AUTO: 0.5 % — SIGNIFICANT CHANGE UP (ref 0–1)
BILIRUB SERPL-MCNC: 0.4 MG/DL — SIGNIFICANT CHANGE UP (ref 0.2–1.2)
BUN SERPL-MCNC: 20 MG/DL — SIGNIFICANT CHANGE UP (ref 10–20)
CALCIUM SERPL-MCNC: 9.7 MG/DL — SIGNIFICANT CHANGE UP (ref 8.4–10.4)
CHLORIDE SERPL-SCNC: 99 MMOL/L — SIGNIFICANT CHANGE UP (ref 98–110)
CO2 SERPL-SCNC: 27 MMOL/L — SIGNIFICANT CHANGE UP (ref 17–32)
CREAT SERPL-MCNC: 0.7 MG/DL — SIGNIFICANT CHANGE UP (ref 0.7–1.5)
EGFR: 121 ML/MIN/1.73M2 — SIGNIFICANT CHANGE UP
EOSINOPHIL # BLD AUTO: 0 K/UL — SIGNIFICANT CHANGE UP (ref 0–0.7)
EOSINOPHIL NFR BLD AUTO: 0 % — SIGNIFICANT CHANGE UP (ref 0–8)
GLUCOSE SERPL-MCNC: 114 MG/DL — HIGH (ref 70–99)
HCG SERPL QL: NEGATIVE — SIGNIFICANT CHANGE UP
HCT VFR BLD CALC: 41.6 % — SIGNIFICANT CHANGE UP (ref 37–47)
HGB BLD-MCNC: 13.5 G/DL — SIGNIFICANT CHANGE UP (ref 12–16)
IMM GRANULOCYTES NFR BLD AUTO: 0.3 % — SIGNIFICANT CHANGE UP (ref 0.1–0.3)
LIDOCAIN IGE QN: 11 U/L — SIGNIFICANT CHANGE UP (ref 7–60)
LYMPHOCYTES # BLD AUTO: 0.81 K/UL — LOW (ref 1.2–3.4)
LYMPHOCYTES # BLD AUTO: 10.9 % — LOW (ref 20.5–51.1)
MCHC RBC-ENTMCNC: 27.7 PG — SIGNIFICANT CHANGE UP (ref 27–31)
MCHC RBC-ENTMCNC: 32.5 G/DL — SIGNIFICANT CHANGE UP (ref 32–37)
MCV RBC AUTO: 85.2 FL — SIGNIFICANT CHANGE UP (ref 81–99)
MONOCYTES # BLD AUTO: 0.29 K/UL — SIGNIFICANT CHANGE UP (ref 0.1–0.6)
MONOCYTES NFR BLD AUTO: 3.9 % — SIGNIFICANT CHANGE UP (ref 1.7–9.3)
NEUTROPHILS # BLD AUTO: 6.27 K/UL — SIGNIFICANT CHANGE UP (ref 1.4–6.5)
NEUTROPHILS NFR BLD AUTO: 84.4 % — HIGH (ref 42.2–75.2)
NRBC # BLD: 0 /100 WBCS — SIGNIFICANT CHANGE UP (ref 0–0)
PLATELET # BLD AUTO: 268 K/UL — SIGNIFICANT CHANGE UP (ref 130–400)
POTASSIUM SERPL-MCNC: 3.8 MMOL/L — SIGNIFICANT CHANGE UP (ref 3.5–5)
POTASSIUM SERPL-SCNC: 3.8 MMOL/L — SIGNIFICANT CHANGE UP (ref 3.5–5)
PROT SERPL-MCNC: 8.1 G/DL — HIGH (ref 6–8)
RBC # BLD: 4.88 M/UL — SIGNIFICANT CHANGE UP (ref 4.2–5.4)
RBC # FLD: 13.5 % — SIGNIFICANT CHANGE UP (ref 11.5–14.5)
SODIUM SERPL-SCNC: 138 MMOL/L — SIGNIFICANT CHANGE UP (ref 135–146)
WBC # BLD: 7.43 K/UL — SIGNIFICANT CHANGE UP (ref 4.8–10.8)
WBC # FLD AUTO: 7.43 K/UL — SIGNIFICANT CHANGE UP (ref 4.8–10.8)

## 2023-01-25 PROCEDURE — 74177 CT ABD & PELVIS W/CONTRAST: CPT | Mod: 26,MA

## 2023-01-25 PROCEDURE — 99285 EMERGENCY DEPT VISIT HI MDM: CPT

## 2023-01-25 PROCEDURE — 71046 X-RAY EXAM CHEST 2 VIEWS: CPT | Mod: 26

## 2023-01-25 RX ORDER — SODIUM CHLORIDE 9 MG/ML
1500 INJECTION, SOLUTION INTRAVENOUS ONCE
Refills: 0 | Status: COMPLETED | OUTPATIENT
Start: 2023-01-25 | End: 2023-01-25

## 2023-01-25 RX ORDER — FAMOTIDINE 10 MG/ML
20 INJECTION INTRAVENOUS ONCE
Refills: 0 | Status: COMPLETED | OUTPATIENT
Start: 2023-01-25 | End: 2023-01-25

## 2023-01-25 RX ORDER — ONDANSETRON 8 MG/1
4 TABLET, FILM COATED ORAL ONCE
Refills: 0 | Status: COMPLETED | OUTPATIENT
Start: 2023-01-25 | End: 2023-01-25

## 2023-01-25 RX ORDER — SODIUM CHLORIDE 9 MG/ML
1000 INJECTION, SOLUTION INTRAVENOUS ONCE
Refills: 0 | Status: COMPLETED | OUTPATIENT
Start: 2023-01-25 | End: 2023-01-25

## 2023-01-25 RX ADMIN — FAMOTIDINE 20 MILLIGRAM(S): 10 INJECTION INTRAVENOUS at 21:25

## 2023-01-25 RX ADMIN — ONDANSETRON 4 MILLIGRAM(S): 8 TABLET, FILM COATED ORAL at 22:24

## 2023-01-25 RX ADMIN — SODIUM CHLORIDE 1500 MILLILITER(S): 9 INJECTION, SOLUTION INTRAVENOUS at 19:26

## 2023-01-25 RX ADMIN — ONDANSETRON 4 MILLIGRAM(S): 8 TABLET, FILM COATED ORAL at 19:26

## 2023-01-25 RX ADMIN — SODIUM CHLORIDE 1000 MILLILITER(S): 9 INJECTION, SOLUTION INTRAVENOUS at 22:24

## 2023-01-25 NOTE — ED PROVIDER NOTE - PHYSICAL EXAMINATION
Physical Exam    Vital Signs: I have reviewed the initial vital signs.  Constitutional: well-nourished, appears stated age, no acute distress  Eyes: Conjunctiva pink, Sclera clear  Cardiovascular: S1 and S2, regular rate, regular rhythm, well-perfused extremities, radial pulses equal and 2+ b/l.   Respiratory: unlabored respiratory effort, clear to auscultation bilaterally no wheezing, rales and rhonchi. pt is speaking full sentences. no accessory muscle use.   Gastrointestinal: soft, (+) mild epigastric tenderness, nondistended abdomen, no pulsatile mass, normal bowl sounds, no rebound, no guarding  Musculoskeletal: FROM of b/l upper and lower extremities.   Integumentary: warm, dry, no rash  Neurologic: awake, alert, extremities’ motor and sensory functions grossly intact.   Psychiatric: appropriate mood, appropriate affect

## 2023-01-25 NOTE — ED PROVIDER NOTE - CARE PROVIDER_API CALL
Glenny Aleman (MD)  Gastroenterology  [address] [place] [address]  Phone: [phone]  Fax: [phone]  Follow Up Time: 7-10 Days

## 2023-01-25 NOTE — ED PROVIDER NOTE - NSFOLLOWUPINSTRUCTIONS_ED_ALL_ED_FT

## 2023-01-25 NOTE — ED PROVIDER NOTE - OBJECTIVE STATEMENT
28 y/o female with a PMH of ADHD presents to the ED for evaluation of nausea and vomiting associated with abdominal discomfort that began this morning. pt reports she drank a four prabhjot last night around 11pm and smoked marijuana. pt reports nbnb emesis about 20x today. pt reports after vomiting she developed abdominal discomfort. pt denies fever, chills, chest pain, sob, cough, back pain, diarrhea, constipation, recent trauma, recent sick contacts, urinary symptoms, diarrhea, or constipation.

## 2023-01-25 NOTE — ED PROVIDER NOTE - PATIENT PORTAL LINK FT
You can access the FollowMyHealth Patient Portal offered by [facility] by registering at the following website: [email or web address]. By joining [employer]’s FollowMyHealth portal, you will also be able to view your health information using other applications (apps) compatible with our system. You can access the FollowMyHealth Patient Portal offered by [facility] by registering at the following website: [email or web address]. By joining [employer]’s FollowMyHealth portal, you will also be able to view your health information using other applications (apps) compatible with our system.

## 2023-01-25 NOTE — ED PROVIDER NOTE - PROGRESS NOTE DETAILS
FF: pt reports she feels better and would like to eat and drink something FF: pt ate and started throwing up again. zofran and fluids ordered. ct ordered. FF: pt tolerating po now ate aleksandr crackers and juice. pt advised of return precautions discussed at bedside. agreeable to dc.

## 2023-01-25 NOTE — ED ADULT NURSE NOTE - OBJECTIVE STATEMENT
bibems drank 1 four prabhjot last night and smoked pot, now c/o abd pain and vomiting  axox4, ambulatory

## 2023-01-25 NOTE — ED PROVIDER NOTE - CLINICAL SUMMARY MEDICAL DECISION MAKING FREE TEXT BOX
19-year-old female past medical history ADHD presents with nausea vomiting abdominal discomfort after she drank 4 Loco and smoke marijuana emesis x20 no blood no cough no chest pain or shortness of breath.  Well appearing, NAD, non toxic. NCAT PERRLA EOMI neck supple non tender normal wob cta bl rrr abdomen s diffusely tender nd no rebound no guarding WWPx4 neuro non focal labs reviewed CBC CMP CT abdomen performed because patient unable to tolerate p.o. abdomen still very tender.  Pt feeling improved, tolerating PO, abdomen soft, non-tender, non-distended, no rebound, no guarding.  Will discharge considered admission considered acute abdomen pancreatitis cholecystitis UTI alcohol and tox

## 2023-01-26 VITALS
TEMPERATURE: 98 F | DIASTOLIC BLOOD PRESSURE: 77 MMHG | SYSTOLIC BLOOD PRESSURE: 127 MMHG | OXYGEN SATURATION: 100 % | RESPIRATION RATE: 19 BRPM | HEART RATE: 63 BPM

## 2023-01-26 RX ORDER — ONDANSETRON 8 MG/1
1 TABLET, FILM COATED ORAL
Qty: 9 | Refills: 0
Start: 2023-01-26 | End: 2023-01-28

## 2023-02-15 NOTE — BRIEF OPERATIVE NOTE - NSICDXBRIEFPOSTOP_GEN_ALL_CORE_FT
POST-OP DIAGNOSIS:  Arrest of dilation, delivered, current hospitalization 26-Mar-2020 11:01:34  Vicente Hale Yes

## 2023-03-13 NOTE — ED ADULT NURSE NOTE - PAIN: RADIATION
left side pain ,left shoulder , left upper thigh pain, neck pain Methotrexate Counseling:  Patient counseled regarding adverse effects of methotrexate including but not limited to nausea, vomiting, abnormalities in liver function tests. Patients may develop mouth sores, rash, diarrhea, and abnormalities in blood counts. The patient understands that monitoring is required including LFT's and blood counts.  There is a rare possibility of scarring of the liver and lung problems that can occur when taking methotrexate. Persistent nausea, loss of appetite, pale stools, dark urine, cough, and shortness of breath should be reported immediately. Patient advised to discontinue methotrexate treatment at least three months before attempting to become pregnant.  I discussed the need for folate supplements while taking methotrexate.  These supplements can decrease side effects during methotrexate treatment. The patient verbalized understanding of the proper use and possible adverse effects of methotrexate.  All of the patient's questions and concerns were addressed.

## 2023-03-21 NOTE — ED ADULT NURSE NOTE - CAS ELECT INFOMATION PROVIDED
DC instructions Hatchet Flap Text: The defect edges were debeveled with a #15 scalpel blade.  Given the location of the defect, shape of the defect and the proximity to free margins a hatchet flap was deemed most appropriate.  Using a sterile surgical marker, an appropriate hatchet flap was drawn incorporating the defect and placing the expected incisions within the relaxed skin tension lines where possible.    The area thus outlined was incised deep to adipose tissue with a #15 scalpel blade.  The skin margins were undermined to an appropriate distance in all directions utilizing iris scissors.

## 2023-04-27 NOTE — ED PROVIDER NOTE - NSTIMEPROVIDERCAREINITIATE_GEN_ER
04-Feb-2018 20:08 Topical Retinoid Pregnancy And Lactation Text: This medication is Pregnancy Category C. It is unknown if this medication is excreted in breast milk.

## 2023-04-28 ENCOUNTER — EMERGENCY (EMERGENCY)
Facility: HOSPITAL | Age: 28
LOS: 0 days | Discharge: HOME | End: 2023-04-28
Attending: EMERGENCY MEDICINE
Payer: MEDICAID

## 2023-04-28 ENCOUNTER — NON-APPOINTMENT (OUTPATIENT)
Age: 28
End: 2023-04-28

## 2023-04-28 VITALS
RESPIRATION RATE: 18 BRPM | HEART RATE: 78 BPM | DIASTOLIC BLOOD PRESSURE: 69 MMHG | TEMPERATURE: 98 F | WEIGHT: 110.01 LBS | SYSTOLIC BLOOD PRESSURE: 114 MMHG | HEIGHT: 64 IN | OXYGEN SATURATION: 98 %

## 2023-04-28 DIAGNOSIS — O99.341 OTHER MENTAL DISORDERS COMPLICATING PREGNANCY, FIRST TRIMESTER: ICD-10-CM

## 2023-04-28 DIAGNOSIS — R10.9 UNSPECIFIED ABDOMINAL PAIN: ICD-10-CM

## 2023-04-28 DIAGNOSIS — R19.7 DIARRHEA, UNSPECIFIED: ICD-10-CM

## 2023-04-28 DIAGNOSIS — O03.9 COMPLETE OR UNSPECIFIED SPONTANEOUS ABORTION WITHOUT COMPLICATION: Chronic | ICD-10-CM

## 2023-04-28 DIAGNOSIS — O99.891 OTHER SPECIFIED DISEASES AND CONDITIONS COMPLICATING PREGNANCY: ICD-10-CM

## 2023-04-28 DIAGNOSIS — Z98.890 OTHER SPECIFIED POSTPROCEDURAL STATES: Chronic | ICD-10-CM

## 2023-04-28 DIAGNOSIS — F90.9 ATTENTION-DEFICIT HYPERACTIVITY DISORDER, UNSPECIFIED TYPE: ICD-10-CM

## 2023-04-28 DIAGNOSIS — Z3A.01 LESS THAN 8 WEEKS GESTATION OF PREGNANCY: ICD-10-CM

## 2023-04-28 DIAGNOSIS — O21.9 VOMITING OF PREGNANCY, UNSPECIFIED: ICD-10-CM

## 2023-04-28 DIAGNOSIS — Z86.19 PERSONAL HISTORY OF OTHER INFECTIOUS AND PARASITIC DISEASES: ICD-10-CM

## 2023-04-28 DIAGNOSIS — R11.2 NAUSEA WITH VOMITING, UNSPECIFIED: ICD-10-CM

## 2023-04-28 LAB
ALBUMIN SERPL ELPH-MCNC: 4.5 G/DL — SIGNIFICANT CHANGE UP (ref 3.5–5.2)
ALP SERPL-CCNC: 37 U/L — SIGNIFICANT CHANGE UP (ref 30–115)
ALT FLD-CCNC: 10 U/L — SIGNIFICANT CHANGE UP (ref 0–41)
ANION GAP SERPL CALC-SCNC: 15 MMOL/L — HIGH (ref 7–14)
APPEARANCE UR: ABNORMAL
AST SERPL-CCNC: 17 U/L — SIGNIFICANT CHANGE UP (ref 0–41)
BACTERIA # UR AUTO: ABNORMAL
BASOPHILS # BLD AUTO: 0.04 K/UL — SIGNIFICANT CHANGE UP (ref 0–0.2)
BASOPHILS NFR BLD AUTO: 0.6 % — SIGNIFICANT CHANGE UP (ref 0–1)
BILIRUB SERPL-MCNC: 0.7 MG/DL — SIGNIFICANT CHANGE UP (ref 0.2–1.2)
BILIRUB UR-MCNC: NEGATIVE — SIGNIFICANT CHANGE UP
BUN SERPL-MCNC: 15 MG/DL — SIGNIFICANT CHANGE UP (ref 10–20)
CALCIUM SERPL-MCNC: 9.3 MG/DL — SIGNIFICANT CHANGE UP (ref 8.4–10.5)
CHLORIDE SERPL-SCNC: 97 MMOL/L — LOW (ref 98–110)
CO2 SERPL-SCNC: 21 MMOL/L — SIGNIFICANT CHANGE UP (ref 17–32)
COLOR SPEC: YELLOW — SIGNIFICANT CHANGE UP
CREAT SERPL-MCNC: 0.6 MG/DL — LOW (ref 0.7–1.5)
DIFF PNL FLD: NEGATIVE — SIGNIFICANT CHANGE UP
EGFR: 125 ML/MIN/1.73M2 — SIGNIFICANT CHANGE UP
EOSINOPHIL # BLD AUTO: 0.13 K/UL — SIGNIFICANT CHANGE UP (ref 0–0.7)
EOSINOPHIL NFR BLD AUTO: 2 % — SIGNIFICANT CHANGE UP (ref 0–8)
EPI CELLS # UR: 16 /HPF — HIGH (ref 0–5)
GLUCOSE SERPL-MCNC: 93 MG/DL — SIGNIFICANT CHANGE UP (ref 70–99)
GLUCOSE UR QL: NEGATIVE — SIGNIFICANT CHANGE UP
HCG SERPL-ACNC: HIGH MIU/ML
HCT VFR BLD CALC: 38.7 % — SIGNIFICANT CHANGE UP (ref 37–47)
HGB BLD-MCNC: 12.9 G/DL — SIGNIFICANT CHANGE UP (ref 12–16)
HYALINE CASTS # UR AUTO: 5 /LPF — SIGNIFICANT CHANGE UP (ref 0–7)
IMM GRANULOCYTES NFR BLD AUTO: 0.3 % — SIGNIFICANT CHANGE UP (ref 0.1–0.3)
KETONES UR-MCNC: ABNORMAL
LEUKOCYTE ESTERASE UR-ACNC: NEGATIVE — SIGNIFICANT CHANGE UP
LYMPHOCYTES # BLD AUTO: 2.68 K/UL — SIGNIFICANT CHANGE UP (ref 1.2–3.4)
LYMPHOCYTES # BLD AUTO: 41.4 % — SIGNIFICANT CHANGE UP (ref 20.5–51.1)
MCHC RBC-ENTMCNC: 28.2 PG — SIGNIFICANT CHANGE UP (ref 27–31)
MCHC RBC-ENTMCNC: 33.3 G/DL — SIGNIFICANT CHANGE UP (ref 32–37)
MCV RBC AUTO: 84.7 FL — SIGNIFICANT CHANGE UP (ref 81–99)
MONOCYTES # BLD AUTO: 0.67 K/UL — HIGH (ref 0.1–0.6)
MONOCYTES NFR BLD AUTO: 10.3 % — HIGH (ref 1.7–9.3)
NEUTROPHILS # BLD AUTO: 2.94 K/UL — SIGNIFICANT CHANGE UP (ref 1.4–6.5)
NEUTROPHILS NFR BLD AUTO: 45.4 % — SIGNIFICANT CHANGE UP (ref 42.2–75.2)
NITRITE UR-MCNC: NEGATIVE — SIGNIFICANT CHANGE UP
NRBC # BLD: 0 /100 WBCS — SIGNIFICANT CHANGE UP (ref 0–0)
PH UR: 6.5 — SIGNIFICANT CHANGE UP (ref 5–8)
PLATELET # BLD AUTO: 266 K/UL — SIGNIFICANT CHANGE UP (ref 130–400)
PMV BLD: 9.2 FL — SIGNIFICANT CHANGE UP (ref 7.4–10.4)
POTASSIUM SERPL-MCNC: 3.4 MMOL/L — LOW (ref 3.5–5)
POTASSIUM SERPL-SCNC: 3.4 MMOL/L — LOW (ref 3.5–5)
PROT SERPL-MCNC: 7.4 G/DL — SIGNIFICANT CHANGE UP (ref 6–8)
PROT UR-MCNC: ABNORMAL
RBC # BLD: 4.57 M/UL — SIGNIFICANT CHANGE UP (ref 4.2–5.4)
RBC # FLD: 13.1 % — SIGNIFICANT CHANGE UP (ref 11.5–14.5)
RBC CASTS # UR COMP ASSIST: 5 /HPF — HIGH (ref 0–4)
SODIUM SERPL-SCNC: 133 MMOL/L — LOW (ref 135–146)
SP GR SPEC: 1.03 — HIGH (ref 1.01–1.03)
UROBILINOGEN FLD QL: SIGNIFICANT CHANGE UP
WBC # BLD: 6.48 K/UL — SIGNIFICANT CHANGE UP (ref 4.8–10.8)
WBC # FLD AUTO: 6.48 K/UL — SIGNIFICANT CHANGE UP (ref 4.8–10.8)
WBC UR QL: 3 /HPF — SIGNIFICANT CHANGE UP (ref 0–5)

## 2023-04-28 PROCEDURE — 96374 THER/PROPH/DIAG INJ IV PUSH: CPT

## 2023-04-28 PROCEDURE — 76830 TRANSVAGINAL US NON-OB: CPT

## 2023-04-28 PROCEDURE — 99284 EMERGENCY DEPT VISIT MOD MDM: CPT

## 2023-04-28 PROCEDURE — 96376 TX/PRO/DX INJ SAME DRUG ADON: CPT

## 2023-04-28 PROCEDURE — 85025 COMPLETE CBC W/AUTO DIFF WBC: CPT

## 2023-04-28 PROCEDURE — 99284 EMERGENCY DEPT VISIT MOD MDM: CPT | Mod: 25

## 2023-04-28 PROCEDURE — 80053 COMPREHEN METABOLIC PANEL: CPT

## 2023-04-28 PROCEDURE — 84702 CHORIONIC GONADOTROPIN TEST: CPT

## 2023-04-28 PROCEDURE — 81001 URINALYSIS AUTO W/SCOPE: CPT

## 2023-04-28 PROCEDURE — 87086 URINE CULTURE/COLONY COUNT: CPT

## 2023-04-28 PROCEDURE — 76830 TRANSVAGINAL US NON-OB: CPT | Mod: 26

## 2023-04-28 PROCEDURE — 96375 TX/PRO/DX INJ NEW DRUG ADDON: CPT

## 2023-04-28 PROCEDURE — 36415 COLL VENOUS BLD VENIPUNCTURE: CPT

## 2023-04-28 RX ORDER — ONDANSETRON 8 MG/1
4 TABLET, FILM COATED ORAL ONCE
Refills: 0 | Status: COMPLETED | OUTPATIENT
Start: 2023-04-28 | End: 2023-04-28

## 2023-04-28 RX ORDER — FAMOTIDINE 10 MG/ML
20 INJECTION INTRAVENOUS ONCE
Refills: 0 | Status: COMPLETED | OUTPATIENT
Start: 2023-04-28 | End: 2023-04-28

## 2023-04-28 RX ORDER — SODIUM CHLORIDE 9 MG/ML
1000 INJECTION, SOLUTION INTRAVENOUS ONCE
Refills: 0 | Status: COMPLETED | OUTPATIENT
Start: 2023-04-28 | End: 2023-04-28

## 2023-04-28 RX ORDER — CEFTRIAXONE 500 MG/1
1000 INJECTION, POWDER, FOR SOLUTION INTRAMUSCULAR; INTRAVENOUS ONCE
Refills: 0 | Status: COMPLETED | OUTPATIENT
Start: 2023-04-28 | End: 2023-04-28

## 2023-04-28 RX ADMIN — ONDANSETRON 4 MILLIGRAM(S): 8 TABLET, FILM COATED ORAL at 10:21

## 2023-04-28 RX ADMIN — SODIUM CHLORIDE 1000 MILLILITER(S): 9 INJECTION, SOLUTION INTRAVENOUS at 08:11

## 2023-04-28 RX ADMIN — FAMOTIDINE 20 MILLIGRAM(S): 10 INJECTION INTRAVENOUS at 08:11

## 2023-04-28 RX ADMIN — CEFTRIAXONE 100 MILLIGRAM(S): 500 INJECTION, POWDER, FOR SOLUTION INTRAMUSCULAR; INTRAVENOUS at 09:59

## 2023-04-28 RX ADMIN — ONDANSETRON 4 MILLIGRAM(S): 8 TABLET, FILM COATED ORAL at 08:11

## 2023-04-28 NOTE — ED PROVIDER NOTE - NSFOLLOWUPCLINICS_GEN_ALL_ED_FT
[facility]  OB/GYN  [address] [place] [address]  Phone: [phone]  Fax:   Follow Up Time: 1-3 Days

## 2023-04-28 NOTE — ED PROVIDER NOTE - CLINICAL SUMMARY MEDICAL DECISION MAKING FREE TEXT BOX
Intractable vomiting, requiring fluids and antiemetics and CT negative at that time, A6 surgical Abs, pw vomiting diarrhea, and diffuse left radiating to right abd pain. Endorses sexual activity unprotected in the recent past.  Most likely Gastroenteritis given abd discomfort, poorly localized, vomiting and diarrhea. preg positive, no ectopic or ovarian torsion. Patient is sexually active, denies vaginal bleeding or discharge and symptoms are chiefly GI without tenderness, unlikely GYN origin of symptoms however with unprotected sex concerned for PID, evaluated and treated per findings.  Eval for electrolyte disturbances or renal dysfunction negative. Patient to be discharged from ED. Any available test results were discussed with patient and/or family. Verbal instructions given, including instructions to return to ED immediately for any new, worsening, or concerning symptoms. Patient endorsed understanding. Written discharge instructions additionally given, including follow-up plan.

## 2023-04-28 NOTE — ED PROVIDER NOTE - CONSIDERATION OF ADMISSION OBSERVATION
Consideration of Admission/Observation Patient for some period was not tolerating PO. Symptoms improved after repeated medication administration.

## 2023-04-28 NOTE — ED PROVIDER NOTE - NSFOLLOWUPINSTRUCTIONS_ED_ALL_ED_FT
Follow up with OBGYN.    Abdominal Pain    Many things can cause abdominal pain. Many times, abdominal pain is not caused by a disease and will improve without treatment. Your health care provider will do a physical exam to determine if there is a dangerous cause of your pain; blood tests and imaging may help determine the cause of your pain. However, in many cases, no cause may be found and you may need further testing as an outpatient. Monitor your abdominal pain for any changes.     SEEK IMMEDIATE MEDICAL CARE IF YOU HAVE ANY OF THE FOLLOWING SYMPTOMS: worsening abdominal pain, uncontrollable vomiting, profuse diarrhea, inability to have bowel movements or pass gas, black or bloody stools, fever accompanying chest pain or back pain, or fainting. These symptoms may represent a serious problem that is an emergency. Do not wait to see if the symptoms will go away. Get medical help right away. Call 911 and do not drive yourself to the hospital.

## 2023-04-28 NOTE — ED PROVIDER NOTE - PHYSICAL EXAMINATION
Pt to room 223 with complaints of SROM at 0001 with large gush of clear fluid, and has been leaking small amounts of clear fluid since.  She has been cramping since 0100.  G1, 36 4/7 wks, GBS pending, reincubated.  Pain rating pain 2/10, tolerable at this point.     CONSTITUTIONAL: Well-developed; well-nourished; in no acute distress.   SKIN: Warm, dry  HEAD: Normocephalic; atraumatic  EYES: PERRL, EOMI, normal sclera and conjunctiva   ENT: No nasal discharge; airway clear. MMM  NECK: Supple; non tender.  CARD:  Regular rate and rhythm. Normal S1, S2. 2+ radial pulses.   RESP: No increased WOB. CTA b/l without wheezes, crackles, rhonchi  ABD: Normoactive BS. Suprapubic tenderness. No rebound or guarding. Soft, nondistended.  EXT: Normal ROM. No LE edema.  NEURO: Alert, oriented, grossly unremarkable  PSYCH: Cooperative, appropriate.

## 2023-04-28 NOTE — ED PROVIDER NOTE - ATTENDING CONTRIBUTION TO CARE
PMH ADHD, prior presentation to this ED with intractable vomiting, requiring fluids and antiemetics and CT negative at that time, A6 surgical Abs, pw vomiting diarrhea, and diffuse left radiating to right abd pain. Endorses sexual activity unprotected in the recent past. States she is worried about pregnancy and does not want to keep the pregnancy if she is pregnant.  Exam: NAD, NCAT, HEENT: DRY mm, EOMI, PERRLA, Neck: supple, nontender, nl ROM, Heart: RRR, no murmur, Lungs: BCTA, no signs of increased WOB, Abd: low Q bl discomfort to palpation, no guarding or rebound, no hernia palpated, no CVAT. MSK: chest, back, and ext nontender, nl rom, no deformity. Neuro: A&Ox3, normal strength, nl sensation throughout, normal speech.  A/P: Most likely Gastroenteritis given abd discomfort, poorly localized, vomiting and diarrhea. pending Upreg. Patient is sexually active, denies vaginal bleeding or discharge and symptoms are chiefly GI without tenderness, unlikely GYN origin of symptoms.  Eval for electrolyte disturbances or renal dysfunction.

## 2023-04-28 NOTE — ED PROVIDER NOTE - PATIENT PORTAL LINK FT
You can access the FollowMyHealth Patient Portal offered by  by registering at the following website: [email or web address]. By joining [employer]’s FollowMyHealth portal, you will also be able to view your health information using other applications (apps) compatible with our system.

## 2023-04-28 NOTE — ED PROVIDER NOTE - OBJECTIVE STATEMENT
28 y/o F, , no PMH, presenting for abdominal pain x3 weeks. Pt reports pain is cramping in nature, intermittent, nonradiating. Past 3 days now has nausea, NBNB vomiting, and diarrhea. Pt reports she is sexually active with one male; believes she is pregnant but does not want to be pregnant; wants to be treated for STI. Denies vaginal discharge/bleeding, dysuria, hematuria. LMP 3/22.

## 2023-04-29 LAB
CULTURE RESULTS: SIGNIFICANT CHANGE UP
SPECIMEN SOURCE: SIGNIFICANT CHANGE UP

## 2023-05-02 ENCOUNTER — NON-APPOINTMENT (OUTPATIENT)
Age: 28
End: 2023-05-02

## 2023-05-02 NOTE — OB PROVIDER TRIAGE NOTE - NS_STATION_OBGYN_ALL_OB_NU
Terri Varghese is a 46 year old female presenting for   Chief Complaint   Patient presents with   • Office Visit     Denies Latex allergy or sensitivity.    Medication verified, no changes  Refills needed today: No    Pt is here for a follow up for SFN   Pt was last seen on 11/1/22 04/07/22 Skin Biopsy  This is an abnormal skin biopsy result.  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  This is consistent with abnormal findings on autonomic testing which demonstrated length dependent reduction in sweat responses of the left foot, left distal leg, left proximal leg, left forearm.    Pt had Auto testing 3/8/22-Her autonomic test was abnormal.   A small fiber neuropathy is suggested - this leads to a mild dysautonomia    Tilt Table 9/22/21-  IMPRESSION:   Tilt table test is positive for presence of vasovagal syncope.     Pt presents alone. Pt reports ongoing brain fog, can not seem to shake, unable to process words & get them out in sentences. \"brain zaps & skipped beats\"     Ongoing intermittent blurred vision - PCP feels may be migraine will try Rizatriptan     Pt taking Lisinopril-hydrochlorothiazide 10-12.5 mg QD, Rizatriptan 10 mg PRN     -3

## 2023-05-08 ENCOUNTER — LABORATORY RESULT (OUTPATIENT)
Age: 28
End: 2023-05-08

## 2023-05-09 ENCOUNTER — APPOINTMENT (OUTPATIENT)
Dept: OBGYN | Facility: CLINIC | Age: 28
End: 2023-05-09
Payer: MEDICAID

## 2023-05-09 ENCOUNTER — NON-APPOINTMENT (OUTPATIENT)
Age: 28
End: 2023-05-09

## 2023-05-09 ENCOUNTER — OUTPATIENT (OUTPATIENT)
Dept: OUTPATIENT SERVICES | Facility: HOSPITAL | Age: 28
LOS: 1 days | End: 2023-05-09
Payer: MEDICAID

## 2023-05-09 VITALS
DIASTOLIC BLOOD PRESSURE: 68 MMHG | BODY MASS INDEX: 19.81 KG/M2 | SYSTOLIC BLOOD PRESSURE: 110 MMHG | HEIGHT: 64 IN | HEART RATE: 98 BPM | WEIGHT: 116 LBS

## 2023-05-09 DIAGNOSIS — O03.9 COMPLETE OR UNSPECIFIED SPONTANEOUS ABORTION WITHOUT COMPLICATION: Chronic | ICD-10-CM

## 2023-05-09 DIAGNOSIS — Z64.0 PROBLEMS RELATED TO UNWANTED PREGNANCY: ICD-10-CM

## 2023-05-09 DIAGNOSIS — Z98.890 OTHER SPECIFIED POSTPROCEDURAL STATES: Chronic | ICD-10-CM

## 2023-05-09 PROCEDURE — 87389 HIV-1 AG W/HIV-1&-2 AB AG IA: CPT

## 2023-05-09 PROCEDURE — 76815 OB US LIMITED FETUS(S): CPT

## 2023-05-09 PROCEDURE — 86900 BLOOD TYPING SEROLOGIC ABO: CPT

## 2023-05-09 PROCEDURE — 87661 TRICHOMONAS VAGINALIS AMPLIF: CPT

## 2023-05-09 PROCEDURE — 99215 OFFICE O/P EST HI 40 MIN: CPT

## 2023-05-09 PROCEDURE — 99213 OFFICE O/P EST LOW 20 MIN: CPT

## 2023-05-09 PROCEDURE — 87491 CHLMYD TRACH DNA AMP PROBE: CPT

## 2023-05-09 PROCEDURE — 85027 COMPLETE CBC AUTOMATED: CPT

## 2023-05-09 PROCEDURE — 87340 HEPATITIS B SURFACE AG IA: CPT

## 2023-05-09 PROCEDURE — 86780 TREPONEMA PALLIDUM: CPT

## 2023-05-09 PROCEDURE — 87591 N.GONORRHOEAE DNA AMP PROB: CPT

## 2023-05-09 PROCEDURE — 36415 COLL VENOUS BLD VENIPUNCTURE: CPT

## 2023-05-09 PROCEDURE — 86850 RBC ANTIBODY SCREEN: CPT

## 2023-05-09 PROCEDURE — 86901 BLOOD TYPING SEROLOGIC RH(D): CPT

## 2023-05-09 PROCEDURE — 86689 HTLV/HIV CONFIRMJ ANTIBODY: CPT

## 2023-05-09 RX ORDER — NORETHINDRONE ACETATE AND ETHINYL ESTRADIOL AND FERROUS FUMARATE 1MG-20(21)
1-20 KIT ORAL DAILY
Qty: 3 | Refills: 3 | Status: ACTIVE | COMMUNITY
Start: 2023-05-09 | End: 1900-01-01

## 2023-05-10 DIAGNOSIS — Z00.00 ENCOUNTER FOR GENERAL ADULT MEDICAL EXAMINATION WITHOUT ABNORMAL FINDINGS: ICD-10-CM

## 2023-05-10 DIAGNOSIS — Z70.8 OTHER SEX COUNSELING: ICD-10-CM

## 2023-05-10 DIAGNOSIS — Z64.0 PROBLEMS RELATED TO UNWANTED PREGNANCY: ICD-10-CM

## 2023-05-10 DIAGNOSIS — Z30.09 ENCOUNTER FOR OTHER GENERAL COUNSELING AND ADVICE ON CONTRACEPTION: ICD-10-CM

## 2023-05-10 LAB
ABO + RH PNL BLD: NORMAL
BASOPHILS # BLD AUTO: 0.03 K/UL
BASOPHILS NFR BLD AUTO: 0.5 %
BLD GP AB SCN SERPL QL: NORMAL
C TRACH RRNA SPEC QL NAA+PROBE: NOT DETECTED
EOSINOPHIL # BLD AUTO: 0.1 K/UL
EOSINOPHIL NFR BLD AUTO: 1.6 %
HCT VFR BLD CALC: 37.3 %
HGB BLD-MCNC: 12 G/DL
IMM GRANULOCYTES NFR BLD AUTO: 0.2 %
LYMPHOCYTES # BLD AUTO: 2.03 K/UL
LYMPHOCYTES NFR BLD AUTO: 32 %
MAN DIFF?: NORMAL
MCHC RBC-ENTMCNC: 28 PG
MCHC RBC-ENTMCNC: 32.2 G/DL
MCV RBC AUTO: 87.1 FL
MONOCYTES # BLD AUTO: 0.73 K/UL
MONOCYTES NFR BLD AUTO: 11.5 %
N GONORRHOEA RRNA SPEC QL NAA+PROBE: NOT DETECTED
NEUTROPHILS # BLD AUTO: 3.45 K/UL
NEUTROPHILS NFR BLD AUTO: 54.2 %
PLATELET # BLD AUTO: 224 K/UL
RBC # BLD: 4.28 M/UL
RBC # FLD: 13.2 %
SOURCE AMPLIFICATION: NORMAL
T PALLIDUM AB SER QL IA: NEGATIVE
WBC # FLD AUTO: 6.35 K/UL

## 2023-05-10 NOTE — HISTORY OF PRESENT ILLNESS
[FreeTextEntry1] : REASON FOR VISIT: Termination of Pregnancy\par \par HISTORY OF PRESENT ILLNESS \par \par Patient is a 27 yo  @8w3d by first trimester michael who presents for pregnancy termination. \par \par Duration: LMP 3/28/23\par \par Associated symptoms: \par \par [ ] none \par [x] nausea \par [x] abdominal/pelvic pain \par [ ] breast soreness \par [ ] vaginal bleeding \par \par The patient has told her partner about the pregnancy and  decision and has good support. The patient is confident in their decision to proceed with termination. No one has pressured them into this decision.\par  \par OBSTETRIC HISTORY \par Complications of prior pregnancies: C/S x1 for arrest of dilation, d&cx 8 no complications \par  \par GYNECOLOGIC HISTORY \par History of STIs: remote history of chlamydia\par History of fibroids: no\par \par CONTRACEPTIVE HISTORY \par Prior methods used: OCPs\par \par MEDICAL HISTORY: ADHD\par \par SURGICAL HISTORY: C/S, d&c x8\par \par MEDICATIONS: none\par \par ALLERGIES: NKDA\par \par FAMILY HISTORY: not significant\par \par SOCIAL HISTORY: smokes marijuana daily. denies cigarette smoking.\par \par Patient lives with : partner and daughter\par \par Employment: [employer]\par \par History of sexual or physical trauma or intimate partner violence? No\par TVUS in ED on : SIUP measuring 6w6d by CRL, positive FH, small 1.4cm subchorionic hematoma\par \par Final gestational age: 8w3d by first trimester sonogram\par \par Reproductive life counseling and contraception. Patient desiring OCPs as she took it prior and was happy with that method of contraception.\par \par

## 2023-05-10 NOTE — COUNSELING
[Contraception/ Emergency Contraception/ Safe Sexual Practices] : contraception, emergency contraception, safe sexual practices [Pregnancy Options] : pregnancy options [Medication Management] : medication management [Pre/Post Op Instructions] : pre/post op instructions

## 2023-05-10 NOTE — DISCUSSION/SUMMARY
[FreeTextEntry1] : Patient is a 28 yo  @8w3d by early trimester sonogram who presents for pre-op evaluation for pregnancy termination. \par \par #Options Counseling: The patient was counseled about her pregnancy options of parenthood, adoption and . She expressed her firm decision for pregnancy termination. \par \par #Risks and benefits of the procedure were explained, including risk of bleeding, infection, uterine perforation and damage to nearby structures, cervical laceration, retained products of conception and need for re-aspiration. \par \par #Patient scheduled for D and C procedure on  \par Rx for doxycycline 200 mg PO to take the night before the procedure. Will receive an additional 100 mg PO in recovery after her procedure. \par Rx for 800mg Ibuprofen sent to pharmacy \par We reviewed preop and postoperative instructions with her, including NPO instructions. \par \par #Labs\par T&S, CBC collected\par \par #Patient offered STI screening and desiring\par GC/CT self swab collected. STI panel collected.\par \par #Desiring OCPs for contraception.\par Rx for Junel Fe sent\par \par follow up as needed\par \par

## 2023-05-10 NOTE — PHYSICAL EXAM
[Appropriately responsive] : appropriately responsive [Alert] : alert [No Acute Distress] : no acute distress [Soft] : soft [Non-tender] : non-tender [No Lesions] : no lesions [Oriented x3] : oriented x3 60

## 2023-05-11 LAB — HBV SURFACE AG SER QL: NONREACTIVE

## 2023-05-11 NOTE — ASU PATIENT PROFILE, ADULT - NSCAGESTDRUGANNOY_GEN_A_CORE_SD
Post-Op Instructions: The patient was instructed in the proper use of post-operative eye drops: Prolensa(or approved generic alternative Bromfenac 0.09%) once daily in the surgical eye for 2 weeks then discontinue. Taper Prednisolone twice a day for 1 week and then one time a day for 1 week then discontinue. Call back instructions, retinal detachment and endophthalmitis precautions given. no

## 2023-05-11 NOTE — ASU PATIENT PROFILE, ADULT - TEACHING/LEARNING FACTORS INFLUENCE READINESS TO LEARN
Refill request for Lexapro 20 mg     Last visit: 3/18/19  Upcoming visit: none at this time  Need F/U in  9/19  Last refill: 7/3/18    E script to [facility]  for # 90 with one refill. Per protocol    none

## 2023-05-11 NOTE — ASU PATIENT PROFILE, ADULT - AS SC BRADEN NUTRITION
Discussed condition and exacerbating conditions/situations (e.g., dry/arid environments, overhead fans, air conditioners, side effect of medications). (3) adequate

## 2023-05-12 ENCOUNTER — RESULT REVIEW (OUTPATIENT)
Age: 28
End: 2023-05-12

## 2023-05-12 ENCOUNTER — TRANSCRIPTION ENCOUNTER (OUTPATIENT)
Age: 28
End: 2023-05-12

## 2023-05-12 ENCOUNTER — OUTPATIENT (OUTPATIENT)
Dept: INPATIENT UNIT | Facility: HOSPITAL | Age: 28
LOS: 1 days | Discharge: HOME | End: 2023-05-12
Payer: MEDICAID

## 2023-05-12 VITALS
HEIGHT: 64 IN | TEMPERATURE: 98 F | SYSTOLIC BLOOD PRESSURE: 117 MMHG | WEIGHT: 115.08 LBS | HEART RATE: 63 BPM | DIASTOLIC BLOOD PRESSURE: 66 MMHG | RESPIRATION RATE: 20 BRPM | OXYGEN SATURATION: 100 %

## 2023-05-12 VITALS
RESPIRATION RATE: 19 BRPM | OXYGEN SATURATION: 100 % | HEART RATE: 70 BPM | SYSTOLIC BLOOD PRESSURE: 113 MMHG | DIASTOLIC BLOOD PRESSURE: 57 MMHG

## 2023-05-12 DIAGNOSIS — Z64.0 PROBLEMS RELATED TO UNWANTED PREGNANCY: ICD-10-CM

## 2023-05-12 DIAGNOSIS — O03.9 COMPLETE OR UNSPECIFIED SPONTANEOUS ABORTION WITHOUT COMPLICATION: Chronic | ICD-10-CM

## 2023-05-12 DIAGNOSIS — Z98.890 OTHER SPECIFIED POSTPROCEDURAL STATES: Chronic | ICD-10-CM

## 2023-05-12 PROCEDURE — 88304 TISSUE EXAM BY PATHOLOGIST: CPT | Mod: 26

## 2023-05-12 PROCEDURE — 88304 TISSUE EXAM BY PATHOLOGIST: CPT

## 2023-05-12 PROCEDURE — 59840 INDUCED ABORTION D&C: CPT

## 2023-05-12 RX ORDER — SODIUM CHLORIDE 9 MG/ML
1000 INJECTION, SOLUTION INTRAVENOUS
Refills: 0 | Status: DISCONTINUED | OUTPATIENT
Start: 2023-05-12 | End: 2023-05-12

## 2023-05-12 RX ORDER — OXYCODONE HYDROCHLORIDE 5 MG/1
5 TABLET ORAL ONCE
Refills: 0 | Status: DISCONTINUED | OUTPATIENT
Start: 2023-05-12 | End: 2023-05-12

## 2023-05-12 RX ORDER — HYDROMORPHONE HYDROCHLORIDE 2 MG/ML
0.5 INJECTION INTRAMUSCULAR; INTRAVENOUS; SUBCUTANEOUS
Refills: 0 | Status: DISCONTINUED | OUTPATIENT
Start: 2023-05-12 | End: 2023-05-12

## 2023-05-12 RX ORDER — ONDANSETRON 8 MG/1
4 TABLET, FILM COATED ORAL ONCE
Refills: 0 | Status: DISCONTINUED | OUTPATIENT
Start: 2023-05-12 | End: 2023-05-12

## 2023-05-12 RX ORDER — ACETAMINOPHEN 500 MG
1000 TABLET ORAL ONCE
Refills: 0 | Status: DISCONTINUED | OUTPATIENT
Start: 2023-05-12 | End: 2023-05-12

## 2023-05-12 RX ORDER — NORETHINDRONE AND ETHINYL ESTRADIOL 0.4-0.035
1 KIT ORAL
Qty: 0 | Refills: 0 | DISCHARGE

## 2023-05-12 RX ADMIN — SODIUM CHLORIDE 100 MILLILITER(S): 9 INJECTION, SOLUTION INTRAVENOUS at 09:01

## 2023-05-12 NOTE — H&P PST ADULT - ATTENDING COMMENTS
Patient presenting today firm in her decision to proceed with termination. She denies questions or concerns. Consent forms previously signed reviewed again today. 2019

## 2023-05-12 NOTE — BRIEF OPERATIVE NOTE - NSICDXBRIEFPROCEDURE_GEN_ALL_CORE_FT
PROCEDURES:   of products of conception using vacuum aspiration 12-May-2023 08:32:19  Alexys Posey

## 2023-05-12 NOTE — H&P PST ADULT - HISTORY OF PRESENT ILLNESS
Patient is a 27 yo  at 8w GA presents for dilation and curettage for termination of pregnancy. Obstetric history significant for C/S x1 and etop x8 with d&cx8. H/o ADHD, not on any medications. H/o of marijuana use. Patient took PO doxycyline 200mg last night, tolerated well. RH positive.    Patient is a 29 yo  at 8w GA presents for dilation and curettage for termination of pregnancy. Obstetric history significant for C/S x1 and itop x8 with d&cx8. H/o ADHD, not on any medications. H/o of marijuana use. Patient took PO doxycyline 200mg last night, tolerated well. RH positive.

## 2023-05-12 NOTE — PRE-ANESTHESIA EVALUATION ADULT - NSANTHDIETYNSD_GEN_ALL_CORE
Other (Free Text): Recommend to moisturize everyday and use the TAC 1-2 times a week. Note Text (......Xxx Chief Complaint.): This diagnosis correlates with the Detail Level: Zone Yes Other (Free Text): Patient has been using cetaphil cleanser for the face nightly and a light moisturizing lotion. Recommend patient used epidou forte nightly 1-2 times a week.

## 2023-05-12 NOTE — BRIEF OPERATIVE NOTE - OPERATION/FINDINGS
Exam under anesthesia revealed normal appearing external genitalia, vaginal mucosa and cervix. Anteverted 8 week sized uterus appreciated. Products of conception evacuated until gritty texture appreciated. Good hemostasis noted. On inspection of products of conception, gestational sac and chorionic villi identified. Exam under anesthesia revealed normal appearing external genitalia, vaginal mucosa and cervix. Anteverted 8 week sized uterus appreciated. Products of conception evacuated until gritty texture appreciated. Good hemostasis noted. On inspection of products of conception, gestational sac and chorionic villi identified. Procedure performed under ultrasound guidance. Exam under anesthesia revealed normal appearing external genitalia, vaginal mucosa and cervix. Anteverted 8 week sized uterus appreciated. Products of conception evacuated until gritty texture appreciated. Good hemostasis noted. On inspection of uterine aspirate gestational sac and chorionic villi identified. Procedure performed under ultrasound guidance revealed empty uterus post operatively.

## 2023-05-12 NOTE — H&P PST ADULT - ASSESSMENT
26 yo  @8wGA on call to the OR for dilation and curettage for termination of pregnancy  -s/p 200mg PO doxycyline  -will administer additional 100mg PO doxycyline postoperatively  -routine preop and postop care  -follow up in 1 week     Dr Blanco aware 26 yo  @8wGA on call to the OR for dilation and curettage for termination of pregnancy  -s/p 200mg PO doxycyline  -routine preop and postop care  -follow up as scheduled     Dr Blanco aware

## 2023-05-12 NOTE — ASU DISCHARGE PLAN (ADULT/PEDIATRIC) - NS MD DC FALL RISK RISK
For information on Fall & Injury Prevention, visit: [email or web address] OR  [email or web address] OR  https://www.cdc.gov/steadi/patient.html

## 2023-05-12 NOTE — CHART NOTE - NSCHARTNOTEFT_GEN_A_CORE
PACU ANESTHESIA ADMISSION NOTE      Procedure:   Post op diagnosis:      ____  Intubated  TV:______       Rate: ______      FiO2: ______    __x__  Patent Airway    __x__  Full return of protective reflexes    __x__  Full recovery from anesthesia / back to baseline status    Vitals  HR: 95  BP: 114/70  RR: 14  O2 Sat: 99  Temp: 97.9    Mental Status:  __x__ Awake   ___x__ Alert   _____ Drowsy   _____ Sedated    Nausea/Vomiting:  __x__ NO  ______Yes,   See Post - Op Orders          Pain Scale (0-10):  _____    Treatment: ____ None    __x__ See Post - Op/PCA Orders    Post - Operative Fluids:   ____ Oral   __x__ See Post - Op Orders    Plan: Discharge when criteria met:   ___x_Home       _____Floor     _____Critical Care   Other:_________________    Comments: Patient had smooth intraoperative event, no anesthesia complication.

## 2023-05-12 NOTE — ASU DISCHARGE PLAN (ADULT/PEDIATRIC) - CARE PROVIDER_API CALL
Yareli Blanco; MPH)  OBSGYN  Physicians  [address] [place] [address]  Phone: [phone]  Fax: [phone]  Follow Up Time:

## 2023-05-13 LAB
SOURCE AMPLIFICATION: NORMAL
T VAGINALIS RRNA SPEC QL NAA+PROBE: NOT DETECTED

## 2023-05-15 LAB — SURGICAL PATHOLOGY STUDY: SIGNIFICANT CHANGE UP

## 2023-05-17 DIAGNOSIS — N85.4 MALPOSITION OF UTERUS: ICD-10-CM

## 2023-05-17 DIAGNOSIS — Z83.3 FAMILY HISTORY OF DIABETES MELLITUS: ICD-10-CM

## 2023-05-17 DIAGNOSIS — Z33.2 ENCOUNTER FOR ELECTIVE TERMINATION OF PREGNANCY: ICD-10-CM

## 2023-05-17 DIAGNOSIS — F90.9 ATTENTION-DEFICIT HYPERACTIVITY DISORDER, UNSPECIFIED TYPE: ICD-10-CM

## 2023-05-17 DIAGNOSIS — Z86.19 PERSONAL HISTORY OF OTHER INFECTIOUS AND PARASITIC DISEASES: ICD-10-CM

## 2023-05-17 DIAGNOSIS — Z80.3 FAMILY HISTORY OF MALIGNANT NEOPLASM OF BREAST: ICD-10-CM

## 2023-05-24 ENCOUNTER — APPOINTMENT (OUTPATIENT)
Dept: OBGYN | Facility: CLINIC | Age: 28
End: 2023-05-24

## 2023-06-24 NOTE — ED ADULT NURSE NOTE - SUICIDE SCREENING QUESTION 1
Pt AxO4. On RA. Elevated temps, controlled w/ PRN tylenol. Refused SCD, on xarelto. 0.9 @ 150. PRN zofran available for nausea. BM loose-watery, repeat stool panel and c.diff negative. C/o pain r/t stomach, lower back, relieved w/ PRN tylenol. On IV zosyn, repeat blood cultures pending. Up w/ standby assist. Updated on POC. Safety measures placed. Care ongoing. No

## 2023-08-09 NOTE — ASU PREOP CHECKLIST - DENTURES
How Severe Is It?: mild Sweating Severity Scale: 2- The sweating is tolerable but sometimes interferes with daily activities Is This A New Presentation, Or A Follow-Up?: Sweating Additional History: Pt states was given Glycopyrrolate by prev dermatologist and saw small improvement but was unable to stay consistent due to not living here in the state at that time, using antibacterial soap currently to help with smell no

## 2023-09-19 NOTE — OB RN DELIVERY SUMMARY - BABY A: APGAR 5 MIN SCORE, DELIVERY
Information: Selecting Yes will display possible errors in your note based on the variables you have selected. This validation is only offered as a suggestion for you. PLEASE NOTE THAT THE VALIDATION TEXT WILL BE REMOVED WHEN YOU FINALIZE YOUR NOTE. IF YOU WANT TO FAX A PRELIMINARY NOTE YOU WILL NEED TO TOGGLE THIS TO 'NO' IF YOU DO NOT WANT IT IN YOUR FAXED NOTE. 9

## 2023-10-04 NOTE — REVIEW OF SYSTEMS
"CRISTO FordN, RN  Abbott Northwestern Negative] : Heme/Lymph [Fever] : no fever [Chills] : no chills [Fatigue] : no fatigue [Dyspnea] : no dyspnea [Cough] : no cough [Chest Pain] : no chest pain [Abdominal Pain] : no abdominal pain [Vomiting] : no vomiting

## 2023-10-13 NOTE — PROGRESS NOTE ADULT - PROVIDER SPECIALTY LIST ADULT
Dental Pt presents with c/o a cough, SOB, night sweats, muscle pain, weight loss, fatigue, joint pain. Onset of sx was 10/04/23. Reports he was seen on 10/11/23 and had blood work done. Also prescribed abx that pt is still taking. Pt was taking tylenol but has not taken it in a few days. Pt refuses covid testing at this time.

## 2023-11-02 NOTE — ED ADULT TRIAGE NOTE - IDEAL BODY WEIGHT(KG)
Clonazepam  1MG / Tablet  Rx# [id number] Benzodiazepine Qty: 30  Days: 30  Refills: 0 Prescribed: 10/4/2023  Dispensed: 10/5/2023     Refill request for clonazepam routed to MD for approval.    PDMP reviewed; no aberrant behavior identified, prescription authorized.   55

## 2023-11-10 NOTE — ED ADULT TRIAGE NOTE - PAIN: PRESENCE, MLM
complains of pain/discomfort Dupixent Counseling: I discussed with the patient the risks of dupilumab including but not limited to eye inflammation and irritation, cold sores, injection site reactions, allergic reactions and increased risk of parasitic infection. The patient understands that monitoring is required and they must alert us or the primary physician if symptoms of infection or other concerning signs are noted.

## 2023-11-17 ENCOUNTER — EMERGENCY (EMERGENCY)
Facility: HOSPITAL | Age: 28
LOS: 0 days | Discharge: HOME | End: 2023-11-17
Attending: STUDENT IN AN ORGANIZED HEALTH CARE EDUCATION/TRAINING PROGRAM
Payer: COMMERCIAL

## 2023-11-17 VITALS
DIASTOLIC BLOOD PRESSURE: 65 MMHG | HEIGHT: 64 IN | SYSTOLIC BLOOD PRESSURE: 115 MMHG | TEMPERATURE: 97 F | HEART RATE: 73 BPM | WEIGHT: 119.93 LBS | OXYGEN SATURATION: 99 % | RESPIRATION RATE: 18 BRPM

## 2023-11-17 DIAGNOSIS — O21.9 VOMITING OF PREGNANCY, UNSPECIFIED: ICD-10-CM

## 2023-11-17 DIAGNOSIS — R10.9 UNSPECIFIED ABDOMINAL PAIN: ICD-10-CM

## 2023-11-17 DIAGNOSIS — O03.9 COMPLETE OR UNSPECIFIED SPONTANEOUS ABORTION WITHOUT COMPLICATION: Chronic | ICD-10-CM

## 2023-11-17 DIAGNOSIS — Z3A.01 LESS THAN 8 WEEKS GESTATION OF PREGNANCY: ICD-10-CM

## 2023-11-17 DIAGNOSIS — O26.891 OTHER SPECIFIED PREGNANCY RELATED CONDITIONS, FIRST TRIMESTER: ICD-10-CM

## 2023-11-17 DIAGNOSIS — Z98.890 OTHER SPECIFIED POSTPROCEDURAL STATES: Chronic | ICD-10-CM

## 2023-11-17 DIAGNOSIS — O23.41 UNSPECIFIED INFECTION OF URINARY TRACT IN PREGNANCY, FIRST TRIMESTER: ICD-10-CM

## 2023-11-17 DIAGNOSIS — B96.89 OTHER SPECIFIED BACTERIAL AGENTS AS THE CAUSE OF DISEASES CLASSIFIED ELSEWHERE: ICD-10-CM

## 2023-11-17 LAB
ALBUMIN SERPL ELPH-MCNC: 4.8 G/DL — SIGNIFICANT CHANGE UP (ref 3.5–5.2)
ALBUMIN SERPL ELPH-MCNC: 4.8 G/DL — SIGNIFICANT CHANGE UP (ref 3.5–5.2)
ALP SERPL-CCNC: 46 U/L — SIGNIFICANT CHANGE UP (ref 30–115)
ALP SERPL-CCNC: 46 U/L — SIGNIFICANT CHANGE UP (ref 30–115)
ALT FLD-CCNC: 10 U/L — SIGNIFICANT CHANGE UP (ref 0–41)
ALT FLD-CCNC: 10 U/L — SIGNIFICANT CHANGE UP (ref 0–41)
ANION GAP SERPL CALC-SCNC: 10 MMOL/L — SIGNIFICANT CHANGE UP (ref 7–14)
ANION GAP SERPL CALC-SCNC: 10 MMOL/L — SIGNIFICANT CHANGE UP (ref 7–14)
APPEARANCE UR: ABNORMAL
APPEARANCE UR: ABNORMAL
AST SERPL-CCNC: 19 U/L — SIGNIFICANT CHANGE UP (ref 0–41)
AST SERPL-CCNC: 19 U/L — SIGNIFICANT CHANGE UP (ref 0–41)
BACTERIA # UR AUTO: ABNORMAL /HPF
BACTERIA # UR AUTO: ABNORMAL /HPF
BASOPHILS # BLD AUTO: 0.04 K/UL — SIGNIFICANT CHANGE UP (ref 0–0.2)
BASOPHILS # BLD AUTO: 0.04 K/UL — SIGNIFICANT CHANGE UP (ref 0–0.2)
BASOPHILS NFR BLD AUTO: 0.7 % — SIGNIFICANT CHANGE UP (ref 0–1)
BASOPHILS NFR BLD AUTO: 0.7 % — SIGNIFICANT CHANGE UP (ref 0–1)
BILIRUB SERPL-MCNC: 0.2 MG/DL — SIGNIFICANT CHANGE UP (ref 0.2–1.2)
BILIRUB SERPL-MCNC: 0.2 MG/DL — SIGNIFICANT CHANGE UP (ref 0.2–1.2)
BILIRUB UR-MCNC: NEGATIVE — SIGNIFICANT CHANGE UP
BILIRUB UR-MCNC: NEGATIVE — SIGNIFICANT CHANGE UP
BUN SERPL-MCNC: 15 MG/DL — SIGNIFICANT CHANGE UP (ref 10–20)
BUN SERPL-MCNC: 15 MG/DL — SIGNIFICANT CHANGE UP (ref 10–20)
CALCIUM SERPL-MCNC: 9.6 MG/DL — SIGNIFICANT CHANGE UP (ref 8.4–10.4)
CALCIUM SERPL-MCNC: 9.6 MG/DL — SIGNIFICANT CHANGE UP (ref 8.4–10.4)
CAST: 1 /LPF — SIGNIFICANT CHANGE UP (ref 0–4)
CAST: 1 /LPF — SIGNIFICANT CHANGE UP (ref 0–4)
CHLORIDE SERPL-SCNC: 99 MMOL/L — SIGNIFICANT CHANGE UP (ref 98–110)
CHLORIDE SERPL-SCNC: 99 MMOL/L — SIGNIFICANT CHANGE UP (ref 98–110)
CO2 SERPL-SCNC: 25 MMOL/L — SIGNIFICANT CHANGE UP (ref 17–32)
CO2 SERPL-SCNC: 25 MMOL/L — SIGNIFICANT CHANGE UP (ref 17–32)
COLOR SPEC: YELLOW — SIGNIFICANT CHANGE UP
COLOR SPEC: YELLOW — SIGNIFICANT CHANGE UP
CREAT SERPL-MCNC: 0.6 MG/DL — LOW (ref 0.7–1.5)
CREAT SERPL-MCNC: 0.6 MG/DL — LOW (ref 0.7–1.5)
DIFF PNL FLD: ABNORMAL
DIFF PNL FLD: ABNORMAL
EGFR: 125 ML/MIN/1.73M2 — SIGNIFICANT CHANGE UP
EGFR: 125 ML/MIN/1.73M2 — SIGNIFICANT CHANGE UP
EOSINOPHIL # BLD AUTO: 0.12 K/UL — SIGNIFICANT CHANGE UP (ref 0–0.7)
EOSINOPHIL # BLD AUTO: 0.12 K/UL — SIGNIFICANT CHANGE UP (ref 0–0.7)
EOSINOPHIL NFR BLD AUTO: 2.1 % — SIGNIFICANT CHANGE UP (ref 0–8)
EOSINOPHIL NFR BLD AUTO: 2.1 % — SIGNIFICANT CHANGE UP (ref 0–8)
GLUCOSE SERPL-MCNC: 102 MG/DL — HIGH (ref 70–99)
GLUCOSE SERPL-MCNC: 102 MG/DL — HIGH (ref 70–99)
GLUCOSE UR QL: NEGATIVE MG/DL — SIGNIFICANT CHANGE UP
GLUCOSE UR QL: NEGATIVE MG/DL — SIGNIFICANT CHANGE UP
HCG SERPL QL: POSITIVE — SIGNIFICANT CHANGE UP
HCG SERPL QL: POSITIVE — SIGNIFICANT CHANGE UP
HCG SERPL-ACNC: HIGH MIU/ML
HCG SERPL-ACNC: HIGH MIU/ML
HCT VFR BLD CALC: 40.9 % — SIGNIFICANT CHANGE UP (ref 37–47)
HCT VFR BLD CALC: 40.9 % — SIGNIFICANT CHANGE UP (ref 37–47)
HGB BLD-MCNC: 13.4 G/DL — SIGNIFICANT CHANGE UP (ref 12–16)
HGB BLD-MCNC: 13.4 G/DL — SIGNIFICANT CHANGE UP (ref 12–16)
HIV 1 & 2 AB SERPL IA.RAPID: SIGNIFICANT CHANGE UP
HIV 1 & 2 AB SERPL IA.RAPID: SIGNIFICANT CHANGE UP
IMM GRANULOCYTES NFR BLD AUTO: 0.2 % — SIGNIFICANT CHANGE UP (ref 0.1–0.3)
IMM GRANULOCYTES NFR BLD AUTO: 0.2 % — SIGNIFICANT CHANGE UP (ref 0.1–0.3)
KETONES UR-MCNC: NEGATIVE MG/DL — SIGNIFICANT CHANGE UP
KETONES UR-MCNC: NEGATIVE MG/DL — SIGNIFICANT CHANGE UP
LEUKOCYTE ESTERASE UR-ACNC: NEGATIVE — SIGNIFICANT CHANGE UP
LEUKOCYTE ESTERASE UR-ACNC: NEGATIVE — SIGNIFICANT CHANGE UP
LYMPHOCYTES # BLD AUTO: 2.1 K/UL — SIGNIFICANT CHANGE UP (ref 1.2–3.4)
LYMPHOCYTES # BLD AUTO: 2.1 K/UL — SIGNIFICANT CHANGE UP (ref 1.2–3.4)
LYMPHOCYTES # BLD AUTO: 36 % — SIGNIFICANT CHANGE UP (ref 20.5–51.1)
LYMPHOCYTES # BLD AUTO: 36 % — SIGNIFICANT CHANGE UP (ref 20.5–51.1)
MCHC RBC-ENTMCNC: 27.3 PG — SIGNIFICANT CHANGE UP (ref 27–31)
MCHC RBC-ENTMCNC: 27.3 PG — SIGNIFICANT CHANGE UP (ref 27–31)
MCHC RBC-ENTMCNC: 32.8 G/DL — SIGNIFICANT CHANGE UP (ref 32–37)
MCHC RBC-ENTMCNC: 32.8 G/DL — SIGNIFICANT CHANGE UP (ref 32–37)
MCV RBC AUTO: 83.5 FL — SIGNIFICANT CHANGE UP (ref 81–99)
MCV RBC AUTO: 83.5 FL — SIGNIFICANT CHANGE UP (ref 81–99)
MONOCYTES # BLD AUTO: 0.67 K/UL — HIGH (ref 0.1–0.6)
MONOCYTES # BLD AUTO: 0.67 K/UL — HIGH (ref 0.1–0.6)
MONOCYTES NFR BLD AUTO: 11.5 % — HIGH (ref 1.7–9.3)
MONOCYTES NFR BLD AUTO: 11.5 % — HIGH (ref 1.7–9.3)
NEUTROPHILS # BLD AUTO: 2.89 K/UL — SIGNIFICANT CHANGE UP (ref 1.4–6.5)
NEUTROPHILS # BLD AUTO: 2.89 K/UL — SIGNIFICANT CHANGE UP (ref 1.4–6.5)
NEUTROPHILS NFR BLD AUTO: 49.5 % — SIGNIFICANT CHANGE UP (ref 42.2–75.2)
NEUTROPHILS NFR BLD AUTO: 49.5 % — SIGNIFICANT CHANGE UP (ref 42.2–75.2)
NITRITE UR-MCNC: NEGATIVE — SIGNIFICANT CHANGE UP
NITRITE UR-MCNC: NEGATIVE — SIGNIFICANT CHANGE UP
NRBC # BLD: 0 /100 WBCS — SIGNIFICANT CHANGE UP (ref 0–0)
NRBC # BLD: 0 /100 WBCS — SIGNIFICANT CHANGE UP (ref 0–0)
PH UR: 7 — SIGNIFICANT CHANGE UP (ref 5–8)
PH UR: 7 — SIGNIFICANT CHANGE UP (ref 5–8)
PLATELET # BLD AUTO: 256 K/UL — SIGNIFICANT CHANGE UP (ref 130–400)
PLATELET # BLD AUTO: 256 K/UL — SIGNIFICANT CHANGE UP (ref 130–400)
PMV BLD: 8.9 FL — SIGNIFICANT CHANGE UP (ref 7.4–10.4)
PMV BLD: 8.9 FL — SIGNIFICANT CHANGE UP (ref 7.4–10.4)
POTASSIUM SERPL-MCNC: 4.1 MMOL/L — SIGNIFICANT CHANGE UP (ref 3.5–5)
POTASSIUM SERPL-MCNC: 4.1 MMOL/L — SIGNIFICANT CHANGE UP (ref 3.5–5)
POTASSIUM SERPL-SCNC: 4.1 MMOL/L — SIGNIFICANT CHANGE UP (ref 3.5–5)
POTASSIUM SERPL-SCNC: 4.1 MMOL/L — SIGNIFICANT CHANGE UP (ref 3.5–5)
PROT SERPL-MCNC: 8 G/DL — SIGNIFICANT CHANGE UP (ref 6–8)
PROT SERPL-MCNC: 8 G/DL — SIGNIFICANT CHANGE UP (ref 6–8)
PROT UR-MCNC: 30 MG/DL
PROT UR-MCNC: 30 MG/DL
RBC # BLD: 4.9 M/UL — SIGNIFICANT CHANGE UP (ref 4.2–5.4)
RBC # BLD: 4.9 M/UL — SIGNIFICANT CHANGE UP (ref 4.2–5.4)
RBC # FLD: 14.4 % — SIGNIFICANT CHANGE UP (ref 11.5–14.5)
RBC # FLD: 14.4 % — SIGNIFICANT CHANGE UP (ref 11.5–14.5)
RBC CASTS # UR COMP ASSIST: 3 /HPF — SIGNIFICANT CHANGE UP (ref 0–4)
RBC CASTS # UR COMP ASSIST: 3 /HPF — SIGNIFICANT CHANGE UP (ref 0–4)
SODIUM SERPL-SCNC: 134 MMOL/L — LOW (ref 135–146)
SODIUM SERPL-SCNC: 134 MMOL/L — LOW (ref 135–146)
SP GR SPEC: 1.01 — SIGNIFICANT CHANGE UP (ref 1–1.03)
SP GR SPEC: 1.01 — SIGNIFICANT CHANGE UP (ref 1–1.03)
SPECIMEN SOURCE: SIGNIFICANT CHANGE UP
SPECIMEN SOURCE: SIGNIFICANT CHANGE UP
SQUAMOUS # UR AUTO: 13 /HPF — HIGH (ref 0–5)
SQUAMOUS # UR AUTO: 13 /HPF — HIGH (ref 0–5)
UROBILINOGEN FLD QL: 0.2 MG/DL — SIGNIFICANT CHANGE UP (ref 0.2–1)
UROBILINOGEN FLD QL: 0.2 MG/DL — SIGNIFICANT CHANGE UP (ref 0.2–1)
WBC # BLD: 5.83 K/UL — SIGNIFICANT CHANGE UP (ref 4.8–10.8)
WBC # BLD: 5.83 K/UL — SIGNIFICANT CHANGE UP (ref 4.8–10.8)
WBC # FLD AUTO: 5.83 K/UL — SIGNIFICANT CHANGE UP (ref 4.8–10.8)
WBC # FLD AUTO: 5.83 K/UL — SIGNIFICANT CHANGE UP (ref 4.8–10.8)
WBC UR QL: 1 /HPF — SIGNIFICANT CHANGE UP (ref 0–5)
WBC UR QL: 1 /HPF — SIGNIFICANT CHANGE UP (ref 0–5)

## 2023-11-17 PROCEDURE — 80053 COMPREHEN METABOLIC PANEL: CPT

## 2023-11-17 PROCEDURE — 99285 EMERGENCY DEPT VISIT HI MDM: CPT | Mod: 25

## 2023-11-17 PROCEDURE — 93005 ELECTROCARDIOGRAM TRACING: CPT

## 2023-11-17 PROCEDURE — 85025 COMPLETE CBC W/AUTO DIFF WBC: CPT

## 2023-11-17 PROCEDURE — 99284 EMERGENCY DEPT VISIT MOD MDM: CPT

## 2023-11-17 PROCEDURE — 76830 TRANSVAGINAL US NON-OB: CPT

## 2023-11-17 PROCEDURE — 87040 BLOOD CULTURE FOR BACTERIA: CPT

## 2023-11-17 PROCEDURE — 96375 TX/PRO/DX INJ NEW DRUG ADDON: CPT

## 2023-11-17 PROCEDURE — 87086 URINE CULTURE/COLONY COUNT: CPT

## 2023-11-17 PROCEDURE — 96374 THER/PROPH/DIAG INJ IV PUSH: CPT

## 2023-11-17 PROCEDURE — 84703 CHORIONIC GONADOTROPIN ASSAY: CPT

## 2023-11-17 PROCEDURE — 86703 HIV-1/HIV-2 1 RESULT ANTBDY: CPT

## 2023-11-17 PROCEDURE — 84702 CHORIONIC GONADOTROPIN TEST: CPT

## 2023-11-17 PROCEDURE — 36415 COLL VENOUS BLD VENIPUNCTURE: CPT

## 2023-11-17 PROCEDURE — 93010 ELECTROCARDIOGRAM REPORT: CPT

## 2023-11-17 PROCEDURE — 87491 CHLMYD TRACH DNA AMP PROBE: CPT

## 2023-11-17 PROCEDURE — 87591 N.GONORRHOEAE DNA AMP PROB: CPT

## 2023-11-17 PROCEDURE — 81001 URINALYSIS AUTO W/SCOPE: CPT

## 2023-11-17 PROCEDURE — 76830 TRANSVAGINAL US NON-OB: CPT | Mod: 26

## 2023-11-17 RX ORDER — FAMOTIDINE 10 MG/ML
20 INJECTION INTRAVENOUS ONCE
Refills: 0 | Status: COMPLETED | OUTPATIENT
Start: 2023-11-17 | End: 2023-11-17

## 2023-11-17 RX ORDER — AMOXICILLIN 250 MG/5ML
1 SUSPENSION, RECONSTITUTED, ORAL (ML) ORAL
Qty: 20 | Refills: 0
Start: 2023-11-17 | End: 2023-11-26

## 2023-11-17 RX ORDER — ONDANSETRON 8 MG/1
4 TABLET, FILM COATED ORAL ONCE
Refills: 0 | Status: COMPLETED | OUTPATIENT
Start: 2023-11-17 | End: 2023-11-17

## 2023-11-17 RX ORDER — SODIUM CHLORIDE 9 MG/ML
1700 INJECTION, SOLUTION INTRAVENOUS ONCE
Refills: 0 | Status: COMPLETED | OUTPATIENT
Start: 2023-11-17 | End: 2023-11-17

## 2023-11-17 RX ADMIN — FAMOTIDINE 20 MILLIGRAM(S): 10 INJECTION INTRAVENOUS at 16:34

## 2023-11-17 RX ADMIN — ONDANSETRON 4 MILLIGRAM(S): 8 TABLET, FILM COATED ORAL at 16:35

## 2023-11-17 RX ADMIN — SODIUM CHLORIDE 1700 MILLILITER(S): 9 INJECTION, SOLUTION INTRAVENOUS at 16:35

## 2023-11-17 NOTE — ED PROVIDER NOTE - PHYSICAL EXAMINATION
Well appearing, NAD, non toxic. NCAT PERRLA EOMI neck supple non tender normal wob cta bl rrr abdomen soft, nd no rebound no guarding WWPx4 neuro non focal

## 2023-11-17 NOTE — ED PROVIDER NOTE - NSFOLLOWUPCLINICS_GEN_ALL_ED_FT
[facility]  OB/GYN  [address] [place] [address]  Phone: [phone]  Fax:

## 2023-11-17 NOTE — ED PROVIDER NOTE - PATIENT PORTAL LINK FT
You can access the FollowMyHealth Patient Portal offered by [facility] by registering at the following website: [email or web address]. By joining [employer]’s FollowMyHealth portal, you will also be able to view your health information using other applications (apps) compatible with our system.

## 2023-11-17 NOTE — ED PROVIDER NOTE - OBJECTIVE STATEMENT
Patient with past medical history of chlamydia multiple years back presents with nausea vomiting abdominal pain for 3 weeks unsure of last LMP no fever chills no urinary symptoms no discharge blood.  Patient is checked sexually active

## 2023-11-17 NOTE — ED PROVIDER NOTE - CLINICAL SUMMARY MEDICAL DECISION MAKING FREE TEXT BOX
Pt feeling improved, tolerating PO, abdomen soft, non-tender, non-distended, no rebound, no guarding.   Imaging was ordered and reviewed by me.  Appropriate medications for patient's presenting complaints were ordered and effects were reassessed.  Patient's records (prior hospital, ED visit, and/or nursing home notes if available) were reviewed.  Additional history was obtained from EMS, family, and/or PCP (where available).  Escalation to admission/observation was considered.  However patient feels much better and is comfortable with discharge.  Appropriate follow-up was arranged. Results reviewed and discussed with pt and printed for patient. Anticipatory guidance given including close outpatient followup. Strict return precautions given. Pt verbalizes understanding of and agrees with plan. pt will be treated for bacteria in urine, pt symptomatic, will f/u [facility] clinic

## 2023-11-18 LAB
C TRACH RRNA SPEC QL NAA+PROBE: SIGNIFICANT CHANGE UP
C TRACH RRNA SPEC QL NAA+PROBE: SIGNIFICANT CHANGE UP
CULTURE RESULTS: SIGNIFICANT CHANGE UP
CULTURE RESULTS: SIGNIFICANT CHANGE UP
N GONORRHOEA RRNA SPEC QL NAA+PROBE: SIGNIFICANT CHANGE UP
N GONORRHOEA RRNA SPEC QL NAA+PROBE: SIGNIFICANT CHANGE UP
SPECIMEN SOURCE: SIGNIFICANT CHANGE UP
SPECIMEN SOURCE: SIGNIFICANT CHANGE UP

## 2023-11-21 NOTE — ED ADULT TRIAGE NOTE - NS ED TRIAGE EKG FT
Jairo Topical Clindamycin Counseling: Patient counseled that this medication may cause skin irritation or allergic reactions.  In the event of skin irritation, the patient was advised to reduce the amount of the drug applied or use it less frequently.   The patient verbalized understanding of the proper use and possible adverse effects of clindamycin.  All of the patient's questions and concerns were addressed.

## 2023-11-23 LAB
CULTURE RESULTS: SIGNIFICANT CHANGE UP
SPECIMEN SOURCE: SIGNIFICANT CHANGE UP

## 2023-11-25 NOTE — ED PROVIDER NOTE - CARE PLAN
Principal Discharge DX:	Abscess of lip  Secondary Diagnosis:	Screen for STD (sexually transmitted disease)
oral

## 2023-11-27 ENCOUNTER — OUTPATIENT (OUTPATIENT)
Dept: OUTPATIENT SERVICES | Facility: HOSPITAL | Age: 28
LOS: 1 days | End: 2023-11-27
Payer: MEDICAID

## 2023-11-27 ENCOUNTER — APPOINTMENT (OUTPATIENT)
Dept: OBGYN | Facility: CLINIC | Age: 28
End: 2023-11-27
Payer: MEDICAID

## 2023-11-27 VITALS
HEART RATE: 67 BPM | DIASTOLIC BLOOD PRESSURE: 76 MMHG | OXYGEN SATURATION: 99 % | WEIGHT: 121 LBS | BODY MASS INDEX: 20.77 KG/M2 | SYSTOLIC BLOOD PRESSURE: 136 MMHG

## 2023-11-27 DIAGNOSIS — Z98.890 OTHER SPECIFIED POSTPROCEDURAL STATES: Chronic | ICD-10-CM

## 2023-11-27 DIAGNOSIS — O03.9 COMPLETE OR UNSPECIFIED SPONTANEOUS ABORTION WITHOUT COMPLICATION: Chronic | ICD-10-CM

## 2023-11-27 DIAGNOSIS — Z00.00 ENCOUNTER FOR GENERAL ADULT MEDICAL EXAMINATION W/OUT ABNORMAL FINDINGS: ICD-10-CM

## 2023-11-27 DIAGNOSIS — Z64.0 PROBLEMS RELATED TO UNWANTED PREGNANCY: ICD-10-CM

## 2023-11-27 PROCEDURE — 76815 OB US LIMITED FETUS(S): CPT | Mod: 26

## 2023-11-27 PROCEDURE — 87591 N.GONORRHOEAE DNA AMP PROB: CPT

## 2023-11-27 PROCEDURE — 99215 OFFICE O/P EST HI 40 MIN: CPT

## 2023-11-27 PROCEDURE — 87661 TRICHOMONAS VAGINALIS AMPLIF: CPT

## 2023-11-27 PROCEDURE — 87491 CHLMYD TRACH DNA AMP PROBE: CPT

## 2023-11-27 RX ORDER — IBUPROFEN 800 MG/1
800 TABLET, FILM COATED ORAL EVERY 8 HOURS
Qty: 15 | Refills: 0 | Status: ACTIVE | COMMUNITY
Start: 2023-11-27 | End: 1900-01-01

## 2023-11-27 RX ORDER — PROMETHAZINE HYDROCHLORIDE 25 MG/1
25 TABLET ORAL
Qty: 5 | Refills: 0 | Status: ACTIVE | COMMUNITY
Start: 2023-11-27 | End: 1900-01-01

## 2023-11-27 RX ORDER — MISOPROSTOL 200 UG/1
200 TABLET ORAL
Qty: 8 | Refills: 0 | Status: ACTIVE | COMMUNITY
Start: 2023-11-27 | End: 1900-01-01

## 2023-11-28 ENCOUNTER — NON-APPOINTMENT (OUTPATIENT)
Age: 28
End: 2023-11-28

## 2023-11-29 DIAGNOSIS — Z33.2 ENCOUNTER FOR ELECTIVE TERMINATION OF PREGNANCY: ICD-10-CM

## 2023-11-29 DIAGNOSIS — Z70.8 OTHER SEX COUNSELING: ICD-10-CM

## 2023-11-29 DIAGNOSIS — Z30.09 ENCOUNTER FOR OTHER GENERAL COUNSELING AND ADVICE ON CONTRACEPTION: ICD-10-CM

## 2023-11-30 LAB
C TRACH RRNA SPEC QL NAA+PROBE: NOT DETECTED
N GONORRHOEA RRNA SPEC QL NAA+PROBE: NOT DETECTED
SOURCE AMPLIFICATION: NORMAL
SOURCE AMPLIFICATION: NORMAL
T VAGINALIS RRNA SPEC QL NAA+PROBE: NOT DETECTED

## 2023-12-04 ENCOUNTER — APPOINTMENT (OUTPATIENT)
Dept: OBGYN | Facility: CLINIC | Age: 28
End: 2023-12-04

## 2023-12-04 NOTE — ED PROVIDER NOTE - NSDCPRINTRESULTS_ED_ALL_ED
She can have Augmentin 500 mg 1 PO bid x 7 days.   Patient requests all Lab and Radiology Results on their Discharge Instructions

## 2023-12-10 NOTE — ED ADULT TRIAGE NOTE - CADM TRG TX PRIOR TO ARRIVAL
none Pt states prior to this admission he was working, he drives car, ambulates without any walking device.

## 2023-12-11 ENCOUNTER — APPOINTMENT (OUTPATIENT)
Dept: OBGYN | Facility: CLINIC | Age: 28
End: 2023-12-11

## 2023-12-11 DIAGNOSIS — Z30.9 ENCOUNTER FOR CONTRACEPTIVE MANAGEMENT, UNSPECIFIED: ICD-10-CM

## 2023-12-14 ENCOUNTER — APPOINTMENT (OUTPATIENT)
Dept: OBGYN | Facility: CLINIC | Age: 28
End: 2023-12-14

## 2023-12-21 ENCOUNTER — EMERGENCY (EMERGENCY)
Facility: HOSPITAL | Age: 28
LOS: 0 days | Discharge: HOME | End: 2023-12-21
Attending: STUDENT IN AN ORGANIZED HEALTH CARE EDUCATION/TRAINING PROGRAM
Payer: MEDICAID

## 2023-12-21 VITALS
DIASTOLIC BLOOD PRESSURE: 70 MMHG | RESPIRATION RATE: 20 BRPM | HEART RATE: 70 BPM | HEIGHT: 64 IN | TEMPERATURE: 98 F | SYSTOLIC BLOOD PRESSURE: 123 MMHG | WEIGHT: 119.93 LBS | OXYGEN SATURATION: 100 %

## 2023-12-21 DIAGNOSIS — O03.9 COMPLETE OR UNSPECIFIED SPONTANEOUS ABORTION WITHOUT COMPLICATION: Chronic | ICD-10-CM

## 2023-12-21 DIAGNOSIS — Z98.890 OTHER SPECIFIED POSTPROCEDURAL STATES: Chronic | ICD-10-CM

## 2023-12-21 DIAGNOSIS — R10.9 UNSPECIFIED ABDOMINAL PAIN: ICD-10-CM

## 2023-12-21 DIAGNOSIS — F10.10 ALCOHOL ABUSE, UNCOMPLICATED: ICD-10-CM

## 2023-12-21 DIAGNOSIS — R11.2 NAUSEA WITH VOMITING, UNSPECIFIED: ICD-10-CM

## 2023-12-21 DIAGNOSIS — R10.13 EPIGASTRIC PAIN: ICD-10-CM

## 2023-12-21 LAB
ALBUMIN SERPL ELPH-MCNC: 4.6 G/DL — SIGNIFICANT CHANGE UP (ref 3.5–5.2)
ALBUMIN SERPL ELPH-MCNC: 4.6 G/DL — SIGNIFICANT CHANGE UP (ref 3.5–5.2)
ALP SERPL-CCNC: 51 U/L — SIGNIFICANT CHANGE UP (ref 30–115)
ALP SERPL-CCNC: 51 U/L — SIGNIFICANT CHANGE UP (ref 30–115)
ALT FLD-CCNC: 11 U/L — SIGNIFICANT CHANGE UP (ref 0–41)
ALT FLD-CCNC: 11 U/L — SIGNIFICANT CHANGE UP (ref 0–41)
ANION GAP SERPL CALC-SCNC: 11 MMOL/L — SIGNIFICANT CHANGE UP (ref 7–14)
ANION GAP SERPL CALC-SCNC: 11 MMOL/L — SIGNIFICANT CHANGE UP (ref 7–14)
AST SERPL-CCNC: 23 U/L — SIGNIFICANT CHANGE UP (ref 0–41)
AST SERPL-CCNC: 23 U/L — SIGNIFICANT CHANGE UP (ref 0–41)
BASOPHILS # BLD AUTO: 0.04 K/UL — SIGNIFICANT CHANGE UP (ref 0–0.2)
BASOPHILS # BLD AUTO: 0.04 K/UL — SIGNIFICANT CHANGE UP (ref 0–0.2)
BASOPHILS NFR BLD AUTO: 0.5 % — SIGNIFICANT CHANGE UP (ref 0–1)
BASOPHILS NFR BLD AUTO: 0.5 % — SIGNIFICANT CHANGE UP (ref 0–1)
BILIRUB DIRECT SERPL-MCNC: <0.2 MG/DL — SIGNIFICANT CHANGE UP (ref 0–0.3)
BILIRUB DIRECT SERPL-MCNC: <0.2 MG/DL — SIGNIFICANT CHANGE UP (ref 0–0.3)
BILIRUB INDIRECT FLD-MCNC: >0.2 MG/DL — SIGNIFICANT CHANGE UP (ref 0.2–1.2)
BILIRUB INDIRECT FLD-MCNC: >0.2 MG/DL — SIGNIFICANT CHANGE UP (ref 0.2–1.2)
BILIRUB SERPL-MCNC: 0.4 MG/DL — SIGNIFICANT CHANGE UP (ref 0.2–1.2)
BILIRUB SERPL-MCNC: 0.4 MG/DL — SIGNIFICANT CHANGE UP (ref 0.2–1.2)
BUN SERPL-MCNC: 15 MG/DL — SIGNIFICANT CHANGE UP (ref 10–20)
BUN SERPL-MCNC: 15 MG/DL — SIGNIFICANT CHANGE UP (ref 10–20)
CALCIUM SERPL-MCNC: 9.3 MG/DL — SIGNIFICANT CHANGE UP (ref 8.4–10.5)
CALCIUM SERPL-MCNC: 9.3 MG/DL — SIGNIFICANT CHANGE UP (ref 8.4–10.5)
CHLORIDE SERPL-SCNC: 102 MMOL/L — SIGNIFICANT CHANGE UP (ref 98–110)
CHLORIDE SERPL-SCNC: 102 MMOL/L — SIGNIFICANT CHANGE UP (ref 98–110)
CO2 SERPL-SCNC: 26 MMOL/L — SIGNIFICANT CHANGE UP (ref 17–32)
CO2 SERPL-SCNC: 26 MMOL/L — SIGNIFICANT CHANGE UP (ref 17–32)
CREAT SERPL-MCNC: 0.6 MG/DL — LOW (ref 0.7–1.5)
CREAT SERPL-MCNC: 0.6 MG/DL — LOW (ref 0.7–1.5)
EGFR: 125 ML/MIN/1.73M2 — SIGNIFICANT CHANGE UP
EGFR: 125 ML/MIN/1.73M2 — SIGNIFICANT CHANGE UP
EOSINOPHIL # BLD AUTO: 0 K/UL — SIGNIFICANT CHANGE UP (ref 0–0.7)
EOSINOPHIL # BLD AUTO: 0 K/UL — SIGNIFICANT CHANGE UP (ref 0–0.7)
EOSINOPHIL NFR BLD AUTO: 0 % — SIGNIFICANT CHANGE UP (ref 0–8)
EOSINOPHIL NFR BLD AUTO: 0 % — SIGNIFICANT CHANGE UP (ref 0–8)
GLUCOSE SERPL-MCNC: 111 MG/DL — HIGH (ref 70–99)
GLUCOSE SERPL-MCNC: 111 MG/DL — HIGH (ref 70–99)
HCG SERPL QL: POSITIVE — SIGNIFICANT CHANGE UP
HCG SERPL QL: POSITIVE — SIGNIFICANT CHANGE UP
HCT VFR BLD CALC: 35.4 % — LOW (ref 37–47)
HCT VFR BLD CALC: 35.4 % — LOW (ref 37–47)
HGB BLD-MCNC: 11.4 G/DL — LOW (ref 12–16)
HGB BLD-MCNC: 11.4 G/DL — LOW (ref 12–16)
IMM GRANULOCYTES NFR BLD AUTO: 0.3 % — SIGNIFICANT CHANGE UP (ref 0.1–0.3)
IMM GRANULOCYTES NFR BLD AUTO: 0.3 % — SIGNIFICANT CHANGE UP (ref 0.1–0.3)
LIDOCAIN IGE QN: 13 U/L — SIGNIFICANT CHANGE UP (ref 7–60)
LIDOCAIN IGE QN: 13 U/L — SIGNIFICANT CHANGE UP (ref 7–60)
LYMPHOCYTES # BLD AUTO: 1.01 K/UL — LOW (ref 1.2–3.4)
LYMPHOCYTES # BLD AUTO: 1.01 K/UL — LOW (ref 1.2–3.4)
LYMPHOCYTES # BLD AUTO: 13.7 % — LOW (ref 20.5–51.1)
LYMPHOCYTES # BLD AUTO: 13.7 % — LOW (ref 20.5–51.1)
MCHC RBC-ENTMCNC: 27.6 PG — SIGNIFICANT CHANGE UP (ref 27–31)
MCHC RBC-ENTMCNC: 27.6 PG — SIGNIFICANT CHANGE UP (ref 27–31)
MCHC RBC-ENTMCNC: 32.2 G/DL — SIGNIFICANT CHANGE UP (ref 32–37)
MCHC RBC-ENTMCNC: 32.2 G/DL — SIGNIFICANT CHANGE UP (ref 32–37)
MCV RBC AUTO: 85.7 FL — SIGNIFICANT CHANGE UP (ref 81–99)
MCV RBC AUTO: 85.7 FL — SIGNIFICANT CHANGE UP (ref 81–99)
MONOCYTES # BLD AUTO: 0.31 K/UL — SIGNIFICANT CHANGE UP (ref 0.1–0.6)
MONOCYTES # BLD AUTO: 0.31 K/UL — SIGNIFICANT CHANGE UP (ref 0.1–0.6)
MONOCYTES NFR BLD AUTO: 4.2 % — SIGNIFICANT CHANGE UP (ref 1.7–9.3)
MONOCYTES NFR BLD AUTO: 4.2 % — SIGNIFICANT CHANGE UP (ref 1.7–9.3)
NEUTROPHILS # BLD AUTO: 6.01 K/UL — SIGNIFICANT CHANGE UP (ref 1.4–6.5)
NEUTROPHILS # BLD AUTO: 6.01 K/UL — SIGNIFICANT CHANGE UP (ref 1.4–6.5)
NEUTROPHILS NFR BLD AUTO: 81.3 % — HIGH (ref 42.2–75.2)
NEUTROPHILS NFR BLD AUTO: 81.3 % — HIGH (ref 42.2–75.2)
NRBC # BLD: 0 /100 WBCS — SIGNIFICANT CHANGE UP (ref 0–0)
NRBC # BLD: 0 /100 WBCS — SIGNIFICANT CHANGE UP (ref 0–0)
PLATELET # BLD AUTO: 310 K/UL — SIGNIFICANT CHANGE UP (ref 130–400)
PLATELET # BLD AUTO: 310 K/UL — SIGNIFICANT CHANGE UP (ref 130–400)
PMV BLD: 9.9 FL — SIGNIFICANT CHANGE UP (ref 7.4–10.4)
PMV BLD: 9.9 FL — SIGNIFICANT CHANGE UP (ref 7.4–10.4)
POTASSIUM SERPL-MCNC: 4 MMOL/L — SIGNIFICANT CHANGE UP (ref 3.5–5)
POTASSIUM SERPL-MCNC: 4 MMOL/L — SIGNIFICANT CHANGE UP (ref 3.5–5)
POTASSIUM SERPL-SCNC: 4 MMOL/L — SIGNIFICANT CHANGE UP (ref 3.5–5)
POTASSIUM SERPL-SCNC: 4 MMOL/L — SIGNIFICANT CHANGE UP (ref 3.5–5)
PROT SERPL-MCNC: 7.9 G/DL — SIGNIFICANT CHANGE UP (ref 6–8)
PROT SERPL-MCNC: 7.9 G/DL — SIGNIFICANT CHANGE UP (ref 6–8)
RBC # BLD: 4.13 M/UL — LOW (ref 4.2–5.4)
RBC # BLD: 4.13 M/UL — LOW (ref 4.2–5.4)
RBC # FLD: 14.1 % — SIGNIFICANT CHANGE UP (ref 11.5–14.5)
RBC # FLD: 14.1 % — SIGNIFICANT CHANGE UP (ref 11.5–14.5)
SODIUM SERPL-SCNC: 139 MMOL/L — SIGNIFICANT CHANGE UP (ref 135–146)
SODIUM SERPL-SCNC: 139 MMOL/L — SIGNIFICANT CHANGE UP (ref 135–146)
WBC # BLD: 7.39 K/UL — SIGNIFICANT CHANGE UP (ref 4.8–10.8)
WBC # BLD: 7.39 K/UL — SIGNIFICANT CHANGE UP (ref 4.8–10.8)
WBC # FLD AUTO: 7.39 K/UL — SIGNIFICANT CHANGE UP (ref 4.8–10.8)
WBC # FLD AUTO: 7.39 K/UL — SIGNIFICANT CHANGE UP (ref 4.8–10.8)

## 2023-12-21 PROCEDURE — 80048 BASIC METABOLIC PNL TOTAL CA: CPT

## 2023-12-21 PROCEDURE — 96375 TX/PRO/DX INJ NEW DRUG ADDON: CPT

## 2023-12-21 PROCEDURE — 83690 ASSAY OF LIPASE: CPT

## 2023-12-21 PROCEDURE — 84703 CHORIONIC GONADOTROPIN ASSAY: CPT

## 2023-12-21 PROCEDURE — 80076 HEPATIC FUNCTION PANEL: CPT

## 2023-12-21 PROCEDURE — 36415 COLL VENOUS BLD VENIPUNCTURE: CPT

## 2023-12-21 PROCEDURE — 76830 TRANSVAGINAL US NON-OB: CPT | Mod: 26

## 2023-12-21 PROCEDURE — 96374 THER/PROPH/DIAG INJ IV PUSH: CPT

## 2023-12-21 PROCEDURE — 85025 COMPLETE CBC W/AUTO DIFF WBC: CPT

## 2023-12-21 PROCEDURE — 99283 EMERGENCY DEPT VISIT LOW MDM: CPT

## 2023-12-21 PROCEDURE — 99284 EMERGENCY DEPT VISIT MOD MDM: CPT | Mod: 25

## 2023-12-21 PROCEDURE — 96376 TX/PRO/DX INJ SAME DRUG ADON: CPT

## 2023-12-21 PROCEDURE — 76830 TRANSVAGINAL US NON-OB: CPT

## 2023-12-21 PROCEDURE — 99285 EMERGENCY DEPT VISIT HI MDM: CPT

## 2023-12-21 RX ORDER — MORPHINE SULFATE 50 MG/1
4 CAPSULE, EXTENDED RELEASE ORAL ONCE
Refills: 0 | Status: DISCONTINUED | OUTPATIENT
Start: 2023-12-21 | End: 2023-12-21

## 2023-12-21 RX ORDER — SODIUM CHLORIDE 9 MG/ML
1000 INJECTION, SOLUTION INTRAVENOUS ONCE
Refills: 0 | Status: DISCONTINUED | OUTPATIENT
Start: 2023-12-21 | End: 2023-12-21

## 2023-12-21 RX ORDER — IBUPROFEN 200 MG
1 TABLET ORAL
Qty: 21 | Refills: 0
Start: 2023-12-21 | End: 2023-12-27

## 2023-12-21 RX ORDER — ONDANSETRON 8 MG/1
1 TABLET, FILM COATED ORAL
Qty: 1 | Refills: 0
Start: 2023-12-21 | End: 2023-12-22

## 2023-12-21 RX ORDER — KETOROLAC TROMETHAMINE 30 MG/ML
15 SYRINGE (ML) INJECTION ONCE
Refills: 0 | Status: DISCONTINUED | OUTPATIENT
Start: 2023-12-21 | End: 2023-12-21

## 2023-12-21 RX ORDER — ONDANSETRON 8 MG/1
4 TABLET, FILM COATED ORAL ONCE
Refills: 0 | Status: COMPLETED | OUTPATIENT
Start: 2023-12-21 | End: 2023-12-21

## 2023-12-21 RX ORDER — FAMOTIDINE 10 MG/ML
20 INJECTION INTRAVENOUS ONCE
Refills: 0 | Status: COMPLETED | OUTPATIENT
Start: 2023-12-21 | End: 2023-12-21

## 2023-12-21 RX ORDER — ACETAMINOPHEN 500 MG
650 TABLET ORAL ONCE
Refills: 0 | Status: DISCONTINUED | OUTPATIENT
Start: 2023-12-21 | End: 2023-12-21

## 2023-12-21 RX ORDER — SODIUM CHLORIDE 9 MG/ML
1700 INJECTION, SOLUTION INTRAVENOUS ONCE
Refills: 0 | Status: COMPLETED | OUTPATIENT
Start: 2023-12-21 | End: 2023-12-21

## 2023-12-21 RX ORDER — IBUPROFEN 200 MG
600 TABLET ORAL ONCE
Refills: 0 | Status: DISCONTINUED | OUTPATIENT
Start: 2023-12-21 | End: 2023-12-21

## 2023-12-21 RX ADMIN — Medication 15 MILLIGRAM(S): at 11:17

## 2023-12-21 RX ADMIN — ONDANSETRON 4 MILLIGRAM(S): 8 TABLET, FILM COATED ORAL at 14:27

## 2023-12-21 RX ADMIN — ONDANSETRON 4 MILLIGRAM(S): 8 TABLET, FILM COATED ORAL at 15:43

## 2023-12-21 RX ADMIN — MORPHINE SULFATE 4 MILLIGRAM(S): 50 CAPSULE, EXTENDED RELEASE ORAL at 13:49

## 2023-12-21 RX ADMIN — FAMOTIDINE 20 MILLIGRAM(S): 10 INJECTION INTRAVENOUS at 14:27

## 2023-12-21 RX ADMIN — SODIUM CHLORIDE 1700 MILLILITER(S): 9 INJECTION, SOLUTION INTRAVENOUS at 09:31

## 2023-12-21 NOTE — CONSULT NOTE ADULT - ASSESSMENT
A/P: 29yo  with abdominal pain and nausea 2/2 alcohol use the night prior, with normal ultrasound findings after recent TOP    - no acute GYN intervention at this time  - patient previously did not present to follow up appointments at [facility], please give patient information for [facility] on discharge so she can make a follow up appointment following her iTOP  - dispo per ED    Case discussed with Dr. Stapleton and Dr. Hale A/P: 29yo  with abdominal pain and nausea 2/2 alcohol use the night prior, with normal ultrasound findings after recent TOP    - no acute GYN intervention at this time  - patient previously did not present to follow up appointments at [facility], please give patient information for [facility] on discharge so she can make a follow up appointment following her iTOP  - dispo per ED    Case discussed with Dr. Stapleton and Dr. Hale

## 2023-12-21 NOTE — ED PROVIDER NOTE - PHYSICAL EXAMINATION
Vital Signs: I have reviewed the initial vital signs.  Constitutional: well-nourished, appears stated age, no acute distress.  HEENT: Airway patent, moist MM, EOMI,   CV: regular rate, regular rhythm, well-perfused extremities,   Lungs: Clear to ascultation bilaterally, no increased work of breathing.  ABD: Non-tender, Non-distended,   MSK: Neck supple, nontender, normal range of motion, no stepoff.   INTEG: Skin warm, dry, no rash.  NEURO: A&Ox3, moving all extremities, normal speech

## 2023-12-21 NOTE — ED PROVIDER NOTE - PATIENT PORTAL LINK FT
You can access the FollowMyHealth Patient Portal offered by [facility] by registering at the following website: [email or web address]. By joining [employer]’s FollowMyHealth portal, you will also be able to view your health information using other applications (apps) compatible with our system. You can access the FollowMyHealth Patient Portal offered by [facility] by registering at the following website: [email or web address]. By joining [employer]’s FollowMyHealth portal, you will also be able to view your health information using other applications (apps) compatible with our system.

## 2023-12-21 NOTE — CONSULT NOTE ADULT - SUBJECTIVE AND OBJECTIVE BOX
OBGYN PGY2    Chief Complaint: abdominal pain    HPI: 27yo  s/p medication TOP at 8w6d by first trimester sono on 23 with misoprostol. She presents to the emergency department with abdominal pain, nausea, vomiting, chills, since 0200 after drinking 2 Four Lokos and a "cup" of liquor last night. She denies fevers, vaginal bleeding, vaginal discharge. All of the patient's symptoms started acutely at 0200 after drinking the night prior.    Ob/Gyn History:                    h/o 1 FT  delivery  h/o 9 iTOP with 8 D+C    GynHx: Remote h/o gonorrhea, chlamydia, syphilis. Denies history of ovarian cysts, uterine fibroids, abnormal paps, or other STIs    Denies the following: constitutional symptoms, visual symptoms, cardiovascular symptoms, respiratory symptoms, GI symptoms, musculoskeletal symptoms, skin symptoms, neurologic symptoms, hematologic symptoms, allergic symptoms, psychiatric symptoms  Except any pertinent positives listed.     Home Medications:  denies    No Known Allergies    PAST MEDICAL & SURGICAL HISTORY:  Chlamydia  ADHD  Trichomonal infection  History of syphilis - treated  History of D&C    FAMILY HISTORY:  Family history of breast cancer (Mother)  Family history of diabetes mellitus (DM) (Father)    SOCIAL HISTORY: Denies cigarette use, alcohol use, or illicit drug use    Vital Signs Last 24 Hrs  T(F): 97.5 (21 Dec 2023 07:48), Max: 97.5 (21 Dec 2023 07:48)  HR: 70 (21 Dec 2023 07:48) (70 - 70)  BP: 123/70 (21 Dec 2023 07:48) (123/70 - 123/70)  RR: 20 (21 Dec 2023 07:48) (20 - 20)    General Appearance - AAOx3, NAD  Heart - S1S2 regular rate and rhythm  Lung - CTA Bilaterally  Abdomen - Soft, diffusely tender to palpation, nondistended, no rebound, no rigidity, no guarding, bowel sounds present    GYN/Pelvis:  External genitalia: normal external genitalia  Vagina: no blood in vagina  Cervix: normal appearing cervix, no cervical motion tenderness  Uterus: no fundal tenderness  Adnexa: no fullness, masses, tenderness palpated bilaterally    LABS:                        11.4   7.39  )-----------( 310      ( 21 Dec 2023 08:50 )             35.4     12-21    139  |  102  |  15  ----------------------------<  111<H>  4.0   |  26  |  0.6<L>    Ca    9.3      21 Dec 2023 08:50    TPro  7.9  /  Alb  4.6  /  TBili  0.4  /  DBili  <0.2  /  AST  23  /  ALT  11  /  AlkPhos  51      Urinalysis Basic - ( 21 Dec 2023 08:50 )  Color: x / Appearance: x / SG: x / pH: x  Gluc: 111 mg/dL / Ketone: x  / Bili: x / Urobili: x   Blood: x / Protein: x / Nitrite: x   Leuk Esterase: x / RBC: x / WBC x   Sq Epi: x / Non Sq Epi: x / Bacteria: x    RADIOLOGY & ADDITIONAL STUDIES:  < from: US Transvaginal (23 @ 11:48) >    ACC: [id number] EXAM:  US TRANSVAGINAL   ORDERED BY: DIAZ ARMSTRONG     PROCEDURE DATE:  2023          INTERPRETATION:  CLINICAL INFORMATION: Pregnancy status recent   termination of pregnancy.    COMPARISON: Sonogram dated 2023    TECHNIQUE:  Endovaginal and transabdominal pelvic sonogram. Color and Spectral   Doppler was performed.    FINDINGS:  Uterus: 12.0 x 4.6 x 5.6 cm. No intrauterine gestational sac identified.   Endometrium measures 13 mm in thickness. Endometrial vascularity.    Right ovary: 3.4 x 1.9 x 2.6 cm. Vascular flow demonstrated.  Left ovary: 3.5 x 2.3 x 2.6 cm. Vascular flow demonstrated to the ovary.    Fluid: None.    IMPRESSION:    Endometrial vascularity, can be seen with retained products of conception   in the appropriate clinical setting (given reported history of recent   pregnancy termination). Recommend clinical correlation.    --- End of Report ---            BRANT DE LA CRUZ MD; Attending Radiologist  This document has been electronically signed.Dec 21 2023 12:18PM    < end of copied text >   OBGYN PGY2    Chief Complaint: abdominal pain    HPI: 27yo  s/p medication TOP at 8w6d by first trimester sono on 23 with misoprostol. She presents to the emergency department with abdominal pain, nausea, vomiting, chills, since 0200 after drinking 2 Four Lokos and a "cup" of liquor last night. She denies fevers, vaginal bleeding, vaginal discharge. All of the patient's symptoms started acutely at 0200 after drinking the night prior.    Ob/Gyn History:                    h/o 1 FT  delivery  h/o 9 iTOP with 8 D+C    GynHx: Remote h/o gonorrhea, chlamydia, syphilis. Denies history of ovarian cysts, uterine fibroids, abnormal paps, or other STIs    Denies the following: constitutional symptoms, visual symptoms, cardiovascular symptoms, respiratory symptoms, GI symptoms, musculoskeletal symptoms, skin symptoms, neurologic symptoms, hematologic symptoms, allergic symptoms, psychiatric symptoms  Except any pertinent positives listed.     Home Medications:  denies    No Known Allergies    PAST MEDICAL & SURGICAL HISTORY:  Chlamydia  ADHD  Trichomonal infection  History of syphilis - treated  History of D&C    FAMILY HISTORY:  Family history of breast cancer (Mother)  Family history of diabetes mellitus (DM) (Father)    SOCIAL HISTORY: Denies cigarette use, alcohol use, or illicit drug use    Vital Signs Last 24 Hrs  T(F): 97.5 (21 Dec 2023 07:48), Max: 97.5 (21 Dec 2023 07:48)  HR: 70 (21 Dec 2023 07:48) (70 - 70)  BP: 123/70 (21 Dec 2023 07:48) (123/70 - 123/70)  RR: 20 (21 Dec 2023 07:48) (20 - 20)    General Appearance - AAOx3, NAD  Heart - S1S2 regular rate and rhythm  Lung - CTA Bilaterally  Abdomen - Soft, diffusely tender to palpation, nondistended, no rebound, no rigidity, no guarding, bowel sounds present    GYN/Pelvis:  External genitalia: normal external genitalia  Vagina: no blood in vagina  Cervix: normal appearing cervix, no cervical motion tenderness  Uterus: no fundal tenderness  Adnexa: no fullness, masses, tenderness palpated bilaterally    LABS:                        11.4   7.39  )-----------( 310      ( 21 Dec 2023 08:50 )             35.4     12-21    139  |  102  |  15  ----------------------------<  111<H>  4.0   |  26  |  0.6<L>    Ca    9.3      21 Dec 2023 08:50    TPro  7.9  /  Alb  4.6  /  TBili  0.4  /  DBili  <0.2  /  AST  23  /  ALT  11  /  AlkPhos  51      Urinalysis Basic - ( 21 Dec 2023 08:50 )  Color: x / Appearance: x / SG: x / pH: x  Gluc: 111 mg/dL / Ketone: x  / Bili: x / Urobili: x   Blood: x / Protein: x / Nitrite: x   Leuk Esterase: x / RBC: x / WBC x   Sq Epi: x / Non Sq Epi: x / Bacteria: x    RADIOLOGY & ADDITIONAL STUDIES:  < from: US Transvaginal (23 @ 11:48) >    ACC: [id number] EXAM:  US TRANSVAGINAL   ORDERED BY: DIAZ ARMSTRONG     PROCEDURE DATE:  2023          INTERPRETATION:  CLINICAL INFORMATION: Pregnancy status recent   termination of pregnancy.    COMPARISON: Sonogram dated 2023    TECHNIQUE:  Endovaginal and transabdominal pelvic sonogram. Color and Spectral   Doppler was performed.    FINDINGS:  Uterus: 12.0 x 4.6 x 5.6 cm. No intrauterine gestational sac identified.   Endometrium measures 13 mm in thickness. Endometrial vascularity.    Right ovary: 3.4 x 1.9 x 2.6 cm. Vascular flow demonstrated.  Left ovary: 3.5 x 2.3 x 2.6 cm. Vascular flow demonstrated to the ovary.    Fluid: None.    IMPRESSION:    Endometrial vascularity, can be seen with retained products of conception   in the appropriate clinical setting (given reported history of recent   pregnancy termination). Recommend clinical correlation.    --- End of Report ---            BRANT DE LA CRUZ MD; Attending Radiologist  This document has been electronically signed.Dec 21 2023 12:18PM    < end of copied text >

## 2023-12-21 NOTE — ASU PATIENT PROFILE, ADULT - FALL HARM RISK - UNIVERSAL INTERVENTIONS
Bed in lowest position, wheels locked, appropriate side rails in place/Call bell, personal items and telephone in reach/Instruct patient to call for assistance before getting out of bed or chair/Non-slip footwear when patient is out of bed/[place] to call system/Physically safe environment - no spills, clutter or unnecessary equipment/Purposeful Proactive Rounding/Room/bathroom lighting operational, light cord in reach
No

## 2023-12-21 NOTE — ED PROVIDER NOTE - NSFOLLOWUPCLINICS_GEN_ALL_ED_FT
[facility]  OB/GYN  [address] [place] [address]  Phone: [phone]  Fax:      [facility]  OB/GYN  [address] [place] [address]  Phone: [phone]  Fax:

## 2023-12-21 NOTE — ED PROVIDER NOTE - PROGRESS NOTE DETAILS
Serum prep positive.  Patient states last LMP sometime in September, was found to be pregnant last month.  Patient had pharmacologic termination on 12/4.  Patient has not had no significant symptoms/abdominal pain until today.  Will order pelvic ultrasound for further evaluation.

## 2023-12-21 NOTE — ED PROVIDER NOTE - OBJECTIVE STATEMENT
dehydration    vomiting    2 cans of 4 prabhjot, and weed 28-year-old female presenting today for evaluation of abdominal pain nausea and vomiting after drinking 2 4 lokos, a cup of liquor, and smoking weed last night.  Patient states she feels dehydrated, has not been able to eat anything since waking up.  Denies fever or chills chest pain shortness of breath dysuria hematuria diarrhea.

## 2023-12-21 NOTE — ED PROVIDER NOTE - CLINICAL SUMMARY MEDICAL DECISION MAKING FREE TEXT BOX
.    28-year-old female, no skin past medical history, presents with nonbloody vomiting and epigastric pain status post drinking a couple cans of 4 Loco and smoking marijuana last night.  Patient drank more than usually drinks.  Patient had no symptoms prior.  No chest pain, shortness of breath, fever, diarrhea, or urinary symptoms.    PE: GEN: NAD; HEAD: NCAT; ENT: MMM; NECK: supple; CARDIO: RRR; PULM: No resp distress; ABD: Soft, + mild TTP upper abdomen, no rebound or guarding, MSK: no pedal edema; NEURO: grossly non focal; PSYCHE: cooperative

## 2023-12-21 NOTE — ED ADULT TRIAGE NOTE - RESPIRATORY RATE (BREATHS/MIN)
----- Message from Sue Pleitez MD sent at 11/13/2017  6:36 AM CST -----  Please notify pt that her reproductive culture showed no other sign of infection. If symptoms not resolved after the terazol, or they recurr in the future, she should come back in for further eval.  thanks   20

## 2024-01-02 NOTE — ASU PREOP CHECKLIST - RESPIRATORY RATE (BREATHS/MIN)
-------------------------------------------  [Contact Information]  -------------------------------------------  - Central Scheduling (for imaging/ other tests):   [place]: [phone].  -------------------------------------------  - Colonoscopy [phone].  -------------------------------------------  - Coronary Calcium Scan: [phone] or Sien/Nativocan.  -------------------------------------------  - Smoking Cessation Information: -QUIT NOW ([phone])  -------------------------------------------  - [place] Helpline for Opioids & Other Substances:   3-301-9XVQARATX (1-611.878.6174)   -------------------------------------------  - Suicide Prevention Hotline #: If you or someone you know is having thoughts of suicide or experiencing a mental health or substance use crisis, 98 provides  connection to confidential support. There is hope. You are not alone. Just call or text 617 or chat [employer], 988 connects you with a trained crisis counselor who can help.  -------------------------------------------  [[facility]  - [address] .  - Phone: [phone]  [[facility] in [place], [place]]:   - [address], [place] [address]  - Phone: [phone]  -------------------------------------------  [Instruction]    -   -   -   -   
20

## 2024-01-09 ENCOUNTER — APPOINTMENT (OUTPATIENT)
Dept: OBGYN | Facility: CLINIC | Age: 29
End: 2024-01-09

## 2024-01-12 NOTE — ED ADULT TRIAGE NOTE - NS ED NURSE BANDS TYPE
Name band;
Pt w normal work up so far. normal first trop. normal dimer. likely vasovagal episode in the setting of viral illness, fever. encouraged to observe for symptoms in the near future. WIll rec f/u w cardio as needed for syncope episode though more likely benign reason for syncope/

## 2024-02-12 ENCOUNTER — APPOINTMENT (OUTPATIENT)
Dept: INTERNAL MEDICINE | Facility: CLINIC | Age: 29
End: 2024-02-12

## 2024-03-07 NOTE — PRE-ANESTHESIA EVALUATION ADULT - NSANTHDISPORD_GEN_ALL_CORE
67 y/o female w/ PMHx of COPD (on 4-4.5 L home oxygen intermittently), HTN, RA, anxiety presenting w/ bloody stools, found to have partially obstructing sigmoid cancer ([id number] - Pathology showing high grade, moderately differentiated invasive adenocarcinoma with 5/18 positive nodes. Negative surgical margins), acute PE on CT chest on heparin drip s/p lap sigmoid colon resection w/ colorectal anastomosis. Recovering well, afebrile, hemodynamically stable. Having bowel function and tolerating diet. Transitioned back to Eliquis for discharge.   - Eliquis for AC for PE   - Regular Diet  - PRN pain control  - Encourage OOB/ambulation & incentive spirometry, Wean O2  - Monitor bowel function/serial abdominal exams  - Trend daily labs  - DVT Prophylaxis with SCDs  - Rest of care per primary team  Dispo: Okay for discharge per surgery, will need Oncology follow up for adjuvant chemo +/- radiation.    PACU

## 2024-03-27 ENCOUNTER — EMERGENCY (EMERGENCY)
Facility: HOSPITAL | Age: 29
LOS: 0 days | Discharge: HOME | End: 2024-03-27
Attending: STUDENT IN AN ORGANIZED HEALTH CARE EDUCATION/TRAINING PROGRAM
Payer: MEDICAID

## 2024-03-27 VITALS
RESPIRATION RATE: 19 BRPM | TEMPERATURE: 98 F | OXYGEN SATURATION: 99 % | HEART RATE: 84 BPM | SYSTOLIC BLOOD PRESSURE: 122 MMHG | DIASTOLIC BLOOD PRESSURE: 60 MMHG | WEIGHT: 114.2 LBS | HEIGHT: 64 IN

## 2024-03-27 VITALS
TEMPERATURE: 97 F | RESPIRATION RATE: 18 BRPM | OXYGEN SATURATION: 99 % | SYSTOLIC BLOOD PRESSURE: 119 MMHG | DIASTOLIC BLOOD PRESSURE: 61 MMHG | HEART RATE: 80 BPM

## 2024-03-27 DIAGNOSIS — R05.1 ACUTE COUGH: ICD-10-CM

## 2024-03-27 DIAGNOSIS — R06.02 SHORTNESS OF BREATH: ICD-10-CM

## 2024-03-27 DIAGNOSIS — Z98.890 OTHER SPECIFIED POSTPROCEDURAL STATES: Chronic | ICD-10-CM

## 2024-03-27 DIAGNOSIS — R07.89 OTHER CHEST PAIN: ICD-10-CM

## 2024-03-27 DIAGNOSIS — O03.9 COMPLETE OR UNSPECIFIED SPONTANEOUS ABORTION WITHOUT COMPLICATION: Chronic | ICD-10-CM

## 2024-03-27 LAB
APPEARANCE UR: ABNORMAL
BACTERIA # UR AUTO: ABNORMAL /HPF
BILIRUB UR-MCNC: NEGATIVE — SIGNIFICANT CHANGE UP
CAST: 0 /LPF — SIGNIFICANT CHANGE UP (ref 0–4)
COLOR SPEC: YELLOW — SIGNIFICANT CHANGE UP
DIFF PNL FLD: NEGATIVE — SIGNIFICANT CHANGE UP
GLUCOSE UR QL: NEGATIVE MG/DL — SIGNIFICANT CHANGE UP
KETONES UR-MCNC: NEGATIVE MG/DL — SIGNIFICANT CHANGE UP
LEUKOCYTE ESTERASE UR-ACNC: NEGATIVE — SIGNIFICANT CHANGE UP
NITRITE UR-MCNC: NEGATIVE — SIGNIFICANT CHANGE UP
PH UR: 7.5 — SIGNIFICANT CHANGE UP (ref 5–8)
PROT UR-MCNC: 30 MG/DL
RBC CASTS # UR COMP ASSIST: 2 /HPF — SIGNIFICANT CHANGE UP (ref 0–4)
SP GR SPEC: 1.03 — SIGNIFICANT CHANGE UP (ref 1–1.03)
SQUAMOUS # UR AUTO: >36 /HPF — HIGH (ref 0–5)
UROBILINOGEN FLD QL: 1 MG/DL — SIGNIFICANT CHANGE UP (ref 0.2–1)
WBC UR QL: 12 /HPF — HIGH (ref 0–5)

## 2024-03-27 PROCEDURE — 99285 EMERGENCY DEPT VISIT HI MDM: CPT | Mod: 25

## 2024-03-27 PROCEDURE — 71046 X-RAY EXAM CHEST 2 VIEWS: CPT

## 2024-03-27 PROCEDURE — 81001 URINALYSIS AUTO W/SCOPE: CPT

## 2024-03-27 PROCEDURE — 93005 ELECTROCARDIOGRAM TRACING: CPT

## 2024-03-27 PROCEDURE — 71046 X-RAY EXAM CHEST 2 VIEWS: CPT | Mod: 26

## 2024-03-27 PROCEDURE — 99284 EMERGENCY DEPT VISIT MOD MDM: CPT

## 2024-03-27 PROCEDURE — 93010 ELECTROCARDIOGRAM REPORT: CPT

## 2024-03-27 RX ORDER — ACETAMINOPHEN 500 MG
975 TABLET ORAL ONCE
Refills: 0 | Status: COMPLETED | OUTPATIENT
Start: 2024-03-27 | End: 2024-03-27

## 2024-03-27 RX ORDER — IBUPROFEN 200 MG
600 TABLET ORAL ONCE
Refills: 0 | Status: COMPLETED | OUTPATIENT
Start: 2024-03-27 | End: 2024-03-27

## 2024-03-27 RX ADMIN — Medication 975 MILLIGRAM(S): at 15:08

## 2024-03-27 RX ADMIN — Medication 600 MILLIGRAM(S): at 16:39

## 2024-03-27 NOTE — ED PROVIDER NOTE - OBJECTIVE STATEMENT
28-year-old female no past medical history presents to the ED complaining of chest pain with productive cough and shortness of breath for 2 weeks.  Patient states pain is worse with sitting forward.  Patient denies any fever, chills, abdominal pain, dizziness.

## 2024-03-27 NOTE — ED ADULT NURSE NOTE - DRUG PRE-SCREENING (DAST -1)
Patient has not had fall while in the hospital.  Patient does not comply with falls prevention, will continue to monitor.  Patient on antibiotics, and currently on room air.    Statement Selected

## 2024-03-27 NOTE — ED PROVIDER NOTE - PHYSICAL EXAMINATION
Vital Signs: I have reviewed the initial vital signs.  Constitutional: NAD, well-nourished, appears stated age, no acute distress.  HEENT: Airway patent, moist MM, no erythema/swelling/deformity of oral structures. EOMI, PERRLA.  CV: regular rate, regular rhythm, well-perfused extremities, 2+ b/l DP and radial pulses equal.  Lungs: BCTA, no increased WOB. +chest wall tenderness.  ABD: NTND, no guarding or rebound, no pulsatile mass, no hernias.   MSK: Neck supple, nontender, nl ROM, no stepoff. Chest nontender. Back nontender. Ext nontender, nl rom, no deformity.   INTEG: Skin warm, dry, no rash.  NEURO: A&Ox3, normal strength, nl sensation throughout, normal speech.

## 2024-03-27 NOTE — ED PROVIDER NOTE - CLINICAL SUMMARY MEDICAL DECISION MAKING FREE TEXT BOX
28-year-old female, no past medical history, presenting with 2 weeks of cough with yellow phlegm, associated with a left anterior chest soreness and pain elicited with deep breathing, watery diarrhea for the last couple days.  Denies fevers, shortness of breath, vomiting, abdominal pain, dizziness family history of cardiac disease, urinary symptoms.  Well-appearing on exam.  Left anterior chest tenderness to palpation.  Lungs clear to auscultation with no increased respiratory effort.  Abdomen soft nondistended nontender.  No CVA tenderness. Imaging ordered and reviewed. Medication given and effects reassessed. 28-year-old female, no past medical history, presenting with 2 weeks of cough with yellow phlegm, associated with a left anterior chest soreness and pain elicited with deep breathing, watery diarrhea for the last couple days.  Denies fevers, shortness of breath, vomiting, abdominal pain, dizziness family history of cardiac disease, urinary symptoms.  Well-appearing on exam.  Left anterior chest tenderness to palpation.  Lungs clear to auscultation with no increased respiratory effort.  Abdomen soft nondistended nontender.  No CVA tenderness. Imaging ordered and reviewed. Medication given and effects reassessed. Discussed all results. Given return precautions and follow up outpatient. Patient feels better and is comfortable with plan.

## 2024-03-27 NOTE — ED PROVIDER NOTE - PATIENT PORTAL LINK FT
You can access the FollowMyHealth Patient Portal offered by [facility] by registering at the following website: [email or web address]. By joining [email or web address] [email or web address] portal, you will also be able to view your health information using other applications (apps) compatible with our system.

## 2024-03-27 NOTE — ED PROVIDER NOTE - NSICDXPASTSURGICALHX_GEN_ALL_CORE_FT
Worsening. PAST SURGICAL HISTORY:   2018    History of D&C     History of surgery TERMINATION X 3  ,,

## 2024-03-30 ENCOUNTER — EMERGENCY (EMERGENCY)
Facility: HOSPITAL | Age: 29
LOS: 0 days | Discharge: HOME | End: 2024-03-30
Attending: EMERGENCY MEDICINE
Payer: MEDICAID

## 2024-03-30 VITALS
TEMPERATURE: 98 F | RESPIRATION RATE: 18 BRPM | HEART RATE: 77 BPM | HEIGHT: 64 IN | OXYGEN SATURATION: 100 % | DIASTOLIC BLOOD PRESSURE: 68 MMHG | SYSTOLIC BLOOD PRESSURE: 123 MMHG | WEIGHT: 125 LBS

## 2024-03-30 VITALS — TEMPERATURE: 96 F

## 2024-03-30 DIAGNOSIS — R10.9 UNSPECIFIED ABDOMINAL PAIN: ICD-10-CM

## 2024-03-30 DIAGNOSIS — Z98.890 OTHER SPECIFIED POSTPROCEDURAL STATES: Chronic | ICD-10-CM

## 2024-03-30 DIAGNOSIS — R11.2 NAUSEA WITH VOMITING, UNSPECIFIED: ICD-10-CM

## 2024-03-30 DIAGNOSIS — R19.7 DIARRHEA, UNSPECIFIED: ICD-10-CM

## 2024-03-30 DIAGNOSIS — O03.9 COMPLETE OR UNSPECIFIED SPONTANEOUS ABORTION WITHOUT COMPLICATION: Chronic | ICD-10-CM

## 2024-03-30 DIAGNOSIS — R11.10 VOMITING, UNSPECIFIED: ICD-10-CM

## 2024-03-30 LAB
ALBUMIN SERPL ELPH-MCNC: 4.8 G/DL — SIGNIFICANT CHANGE UP (ref 3.5–5.2)
ALP SERPL-CCNC: 42 U/L — SIGNIFICANT CHANGE UP (ref 30–115)
ALT FLD-CCNC: 12 U/L — SIGNIFICANT CHANGE UP (ref 0–41)
ANION GAP SERPL CALC-SCNC: 15 MMOL/L — HIGH (ref 7–14)
AST SERPL-CCNC: 34 U/L — SIGNIFICANT CHANGE UP (ref 0–41)
BASOPHILS # BLD AUTO: 0.06 K/UL — SIGNIFICANT CHANGE UP (ref 0–0.2)
BASOPHILS NFR BLD AUTO: 0.7 % — SIGNIFICANT CHANGE UP (ref 0–1)
BILIRUB SERPL-MCNC: 0.3 MG/DL — SIGNIFICANT CHANGE UP (ref 0.2–1.2)
BLD GP AB SCN SERPL QL: SIGNIFICANT CHANGE UP
BUN SERPL-MCNC: 18 MG/DL — SIGNIFICANT CHANGE UP (ref 10–20)
CALCIUM SERPL-MCNC: 9.4 MG/DL — SIGNIFICANT CHANGE UP (ref 8.4–10.4)
CHLORIDE SERPL-SCNC: 99 MMOL/L — SIGNIFICANT CHANGE UP (ref 98–110)
CO2 SERPL-SCNC: 21 MMOL/L — SIGNIFICANT CHANGE UP (ref 17–32)
CREAT SERPL-MCNC: 0.8 MG/DL — SIGNIFICANT CHANGE UP (ref 0.7–1.5)
EGFR: 102 ML/MIN/1.73M2 — SIGNIFICANT CHANGE UP
EOSINOPHIL # BLD AUTO: 0.08 K/UL — SIGNIFICANT CHANGE UP (ref 0–0.7)
EOSINOPHIL NFR BLD AUTO: 0.9 % — SIGNIFICANT CHANGE UP (ref 0–8)
GLUCOSE SERPL-MCNC: 110 MG/DL — HIGH (ref 70–99)
HCG SERPL QL: NEGATIVE — SIGNIFICANT CHANGE UP
HCT VFR BLD CALC: 41.9 % — SIGNIFICANT CHANGE UP (ref 37–47)
HGB BLD-MCNC: 13.5 G/DL — SIGNIFICANT CHANGE UP (ref 12–16)
IMM GRANULOCYTES NFR BLD AUTO: 0.2 % — SIGNIFICANT CHANGE UP (ref 0.1–0.3)
LIDOCAIN IGE QN: 22 U/L — SIGNIFICANT CHANGE UP (ref 7–60)
LYMPHOCYTES # BLD AUTO: 2.29 K/UL — SIGNIFICANT CHANGE UP (ref 1.2–3.4)
LYMPHOCYTES # BLD AUTO: 26.1 % — SIGNIFICANT CHANGE UP (ref 20.5–51.1)
MCHC RBC-ENTMCNC: 27.2 PG — SIGNIFICANT CHANGE UP (ref 27–31)
MCHC RBC-ENTMCNC: 32.2 G/DL — SIGNIFICANT CHANGE UP (ref 32–37)
MCV RBC AUTO: 84.5 FL — SIGNIFICANT CHANGE UP (ref 81–99)
MONOCYTES # BLD AUTO: 0.5 K/UL — SIGNIFICANT CHANGE UP (ref 0.1–0.6)
MONOCYTES NFR BLD AUTO: 5.7 % — SIGNIFICANT CHANGE UP (ref 1.7–9.3)
NEUTROPHILS # BLD AUTO: 5.84 K/UL — SIGNIFICANT CHANGE UP (ref 1.4–6.5)
NEUTROPHILS NFR BLD AUTO: 66.4 % — SIGNIFICANT CHANGE UP (ref 42.2–75.2)
NRBC # BLD: 0 /100 WBCS — SIGNIFICANT CHANGE UP (ref 0–0)
PLATELET # BLD AUTO: 222 K/UL — SIGNIFICANT CHANGE UP (ref 130–400)
PMV BLD: 10.9 FL — HIGH (ref 7.4–10.4)
POTASSIUM SERPL-MCNC: 5.5 MMOL/L — HIGH (ref 3.5–5)
POTASSIUM SERPL-SCNC: 5.5 MMOL/L — HIGH (ref 3.5–5)
PROT SERPL-MCNC: 8.4 G/DL — HIGH (ref 6–8)
RBC # BLD: 4.96 M/UL — SIGNIFICANT CHANGE UP (ref 4.2–5.4)
RBC # FLD: 15 % — HIGH (ref 11.5–14.5)
SODIUM SERPL-SCNC: 135 MMOL/L — SIGNIFICANT CHANGE UP (ref 135–146)
WBC # BLD: 8.79 K/UL — SIGNIFICANT CHANGE UP (ref 4.8–10.8)
WBC # FLD AUTO: 8.79 K/UL — SIGNIFICANT CHANGE UP (ref 4.8–10.8)

## 2024-03-30 PROCEDURE — 74177 CT ABD & PELVIS W/CONTRAST: CPT | Mod: MC

## 2024-03-30 PROCEDURE — 84703 CHORIONIC GONADOTROPIN ASSAY: CPT

## 2024-03-30 PROCEDURE — 85025 COMPLETE CBC W/AUTO DIFF WBC: CPT

## 2024-03-30 PROCEDURE — 99284 EMERGENCY DEPT VISIT MOD MDM: CPT | Mod: 25

## 2024-03-30 PROCEDURE — 96374 THER/PROPH/DIAG INJ IV PUSH: CPT | Mod: XU

## 2024-03-30 PROCEDURE — 86901 BLOOD TYPING SEROLOGIC RH(D): CPT

## 2024-03-30 PROCEDURE — C9113: CPT

## 2024-03-30 PROCEDURE — 74177 CT ABD & PELVIS W/CONTRAST: CPT | Mod: 26,MC

## 2024-03-30 PROCEDURE — 99285 EMERGENCY DEPT VISIT HI MDM: CPT

## 2024-03-30 PROCEDURE — 96375 TX/PRO/DX INJ NEW DRUG ADDON: CPT

## 2024-03-30 PROCEDURE — 96376 TX/PRO/DX INJ SAME DRUG ADON: CPT

## 2024-03-30 PROCEDURE — 36415 COLL VENOUS BLD VENIPUNCTURE: CPT

## 2024-03-30 PROCEDURE — 80053 COMPREHEN METABOLIC PANEL: CPT

## 2024-03-30 PROCEDURE — 86850 RBC ANTIBODY SCREEN: CPT

## 2024-03-30 PROCEDURE — 83690 ASSAY OF LIPASE: CPT

## 2024-03-30 PROCEDURE — 86900 BLOOD TYPING SEROLOGIC ABO: CPT

## 2024-03-30 RX ORDER — MORPHINE SULFATE 50 MG/1
4 CAPSULE, EXTENDED RELEASE ORAL ONCE
Refills: 0 | Status: DISCONTINUED | OUTPATIENT
Start: 2024-03-30 | End: 2024-03-30

## 2024-03-30 RX ORDER — FAMOTIDINE 10 MG/ML
20 INJECTION INTRAVENOUS
Refills: 0 | Status: DISCONTINUED | OUTPATIENT
Start: 2024-03-30 | End: 2024-03-30

## 2024-03-30 RX ORDER — SODIUM CHLORIDE 9 MG/ML
1000 INJECTION INTRAMUSCULAR; INTRAVENOUS; SUBCUTANEOUS ONCE
Refills: 0 | Status: COMPLETED | OUTPATIENT
Start: 2024-03-30 | End: 2024-03-30

## 2024-03-30 RX ORDER — ONDANSETRON 8 MG/1
4 TABLET, FILM COATED ORAL ONCE
Refills: 0 | Status: COMPLETED | OUTPATIENT
Start: 2024-03-30 | End: 2024-03-30

## 2024-03-30 RX ORDER — PANTOPRAZOLE SODIUM 20 MG/1
40 TABLET, DELAYED RELEASE ORAL ONCE
Refills: 0 | Status: COMPLETED | OUTPATIENT
Start: 2024-03-30 | End: 2024-03-30

## 2024-03-30 RX ORDER — IOHEXOL 300 MG/ML
30 INJECTION, SOLUTION INTRAVENOUS ONCE
Refills: 0 | Status: COMPLETED | OUTPATIENT
Start: 2024-03-30 | End: 2024-03-30

## 2024-03-30 RX ORDER — KETOROLAC TROMETHAMINE 30 MG/ML
15 SYRINGE (ML) INJECTION ONCE
Refills: 0 | Status: DISCONTINUED | OUTPATIENT
Start: 2024-03-30 | End: 2024-03-30

## 2024-03-30 RX ADMIN — IOHEXOL 30 MILLILITER(S): 300 INJECTION, SOLUTION INTRAVENOUS at 16:29

## 2024-03-30 RX ADMIN — PANTOPRAZOLE SODIUM 40 MILLIGRAM(S): 20 TABLET, DELAYED RELEASE ORAL at 15:57

## 2024-03-30 RX ADMIN — MORPHINE SULFATE 4 MILLIGRAM(S): 50 CAPSULE, EXTENDED RELEASE ORAL at 19:56

## 2024-03-30 RX ADMIN — Medication 15 MILLIGRAM(S): at 21:52

## 2024-03-30 RX ADMIN — FAMOTIDINE 100 MILLIGRAM(S): 10 INJECTION INTRAVENOUS at 16:00

## 2024-03-30 RX ADMIN — Medication 15 MILLIGRAM(S): at 15:57

## 2024-03-30 RX ADMIN — SODIUM CHLORIDE 1000 MILLILITER(S): 9 INJECTION INTRAMUSCULAR; INTRAVENOUS; SUBCUTANEOUS at 15:57

## 2024-03-30 RX ADMIN — MORPHINE SULFATE 4 MILLIGRAM(S): 50 CAPSULE, EXTENDED RELEASE ORAL at 17:02

## 2024-03-30 RX ADMIN — ONDANSETRON 4 MILLIGRAM(S): 8 TABLET, FILM COATED ORAL at 15:58

## 2024-03-30 NOTE — ED PROVIDER NOTE - OBJECTIVE STATEMENT
28-year-old female no reported past medical history presents to the ED complaining of abdominal pain.  Patient with acute onset of diffuse abdominal pain associated with multiple episodes of nonbilious vomiting after drinking alcohol last night.  Patient reports that she had to cups of tequila and vodka.  Patient admits that there is some blood in her vomit, about a teaspoon worth.  Also admits to diarrhea.  No fevers, chills, chest pain, shortness of breath, vaginal bleeding, dysuria, melena, hematochezia.

## 2024-03-30 NOTE — ED PROVIDER NOTE - PHYSICAL EXAMINATION
CONST: Patient actively vomiting, uncomfortable in NAD  EYES: PERRL, EOMI, Sclera and conjunctiva clear.   ENT: Oropharynx normal appearing, no erythema or exudates. Uvula midline.  CARD: Normal S1 S2; Normal rate and rhythm  RESP: Equal BS B/L, No wheezes, rhonchi or rales. No distress  GI: Mildly diffusely tender abdomen.  No rebound or guarding. Abdomen soft nondistended.  MS: Normal ROM in all extremities. No midline spinal tenderness.  SKIN: Warm, dry, no acute rashes.   NEURO: A&Ox3, No focal deficits. Strength 5/5 with no sensory deficits.

## 2024-03-30 NOTE — ED PROVIDER NOTE - ATTENDING SHARED VISIT SELECTOR YES
Subjective:    CC: Follow-up of the Right Knee and Follow-up (Rt knee injection f/u 10/12/22 pt states injection helped but has worn off )       HPI:  Patient returns today for repeat exam.  Patient states the injection did help, she states her knee is a little bit better though she still feels it, fullness in her knee.    ROS: Refer to HPI for pertinent ROS. All other 12 point systems negative.    Objective:  Vitals:    11/09/22 1318   BP: 123/84   BP Location: Right arm   Patient Position: Sitting   BP Method: Medium (Automatic)   Pulse: 87   Temp: 98.5 °F (36.9 °C)   Weight: 88.5 kg (195 lb)        Physical Exam:  The patient is well-nourished, well-developed and in no apparent distress, pleasant and cooperative. Examination of the right lower extremity compartments are soft and warm. Skin is intact. There are no signs or symptoms of DVT or infection. There is no obvious joint effusion. There is no erythema. Tender to palpation along the mediolateral joint line , right knee range of motion is 0-110. The knee is stable to exam with varus and valgus stressing. Negative anterior and posterior drawer. Negative Lachman´s.  Negative Amaris's test. Patella grind is positive, Negative for apprehension. Neurovascularly intact distally.    Images: . Images Reviewed and discussed with patient.    Assessment:  1. Localized osteoarthritis of right knee        Plan:  At this time we discussed her physical exam and previous imaging findings.  We have discussed the gel injections, formal therapy, we have also discussed MRI needed.  I would like see back in 6 weeks to see how she is progressing.  We have discussed anti-inflammatories with appropriate precautions.    Follow UP: No follow-ups on file.                 Yes

## 2024-03-30 NOTE — ED PROVIDER NOTE - PATIENT PORTAL LINK FT
You can access the FollowMyHealth Patient Portal offered by [facility] by registering at the following website: [email or web address]. By joining [employer]’s FollowMyHealth portal, you will also be able to view your health information using other applications (apps) compatible with our system.

## 2024-03-30 NOTE — ED PROVIDER NOTE - CLINICAL SUMMARY MEDICAL DECISION MAKING FREE TEXT BOX
Patient feels much better, tolerating p.o., no acute surgical pathology on imaging, DC home with supportive care advised bland diet p.o. hydration, strict return precautions provided, follow-up PMD 1 to 2 weeks

## 2024-03-30 NOTE — ED ADULT NURSE NOTE - NSFALLUNIVINTERV_ED_ALL_ED
Bed/Stretcher in lowest position, wheels locked, appropriate side rails in place/Call bell, personal items and telephone in reach/Instruct patient to call for assistance before getting out of bed/chair/stretcher/Non-slip footwear applied when patient is off stretcher/[place] to call system/Physically safe environment - no spills, clutter or unnecessary equipment/Purposeful proactive rounding/Room/bathroom lighting operational, light cord in reach

## 2024-03-30 NOTE — ED PROVIDER NOTE - ATTENDING APP SHARED VISIT CONTRIBUTION OF CARE
28-year-old female no past medical/past surgical history presents with 1 day of nausea vomiting diarrhea crampy diffuse abdominal pain.  Patient states she drank 2 to 3 cups of vodka and tequila yesterday.  Today vomited 10 times, had diarrhea twice, chills body aches and diffuse crampy intermittent abdominal pain.  No fever.  No chest pain shortness of breath.  No dysuria frequency hematuria.    On exam, AFVSS, Well appearing, No acute distress, NCAT, EOMI, PERRLA, MMM, Neck supple, LCTAB, RRR nl s1s2 No mrg, Abdomen Soft NTND, no CVA tenderness, AAOx3, No Focal Deficits, No LE edema or calf TTP,    A/P; nausea vomiting diarrhea abdominal pain, will do labs IV fluids antiemetics pain control PPI CT abdomen pelvis pregnancy test reeval

## 2024-03-30 NOTE — ED ADULT TRIAGE NOTE - CHIEF COMPLAINT QUOTE
pt biba from home after drinking too much last night- pt is not altered - has been vomiting and having diarrhea since this morning

## 2024-03-30 NOTE — ED ADULT NURSE NOTE - BREATHING, MLM
PAST MEDICAL HISTORY:  Anxiety     Back pain      Spontaneous, unlabored and symmetrical PAST MEDICAL HISTORY:  Anxiety     Back pain     H/O contact dermatitis at present right dorsal hand    Stomach pain very mild Gi workup in progress

## 2024-03-30 NOTE — ED ADULT NURSE NOTE - OBJECTIVE STATEMENT
28yof c/o vomiting blood and vomitus since yesterday. Pt states "Drank vodka and tequila." Pt has no changes in LOC.

## 2024-04-30 NOTE — PROGRESS NOTE ADULT - SUBJECTIVE AND OBJECTIVE BOX
[id number]: Afebrile. LSC on RA. Tachycardic when awake in 140s, 90s while asleep. No s/s of pain or discomfort. Tolerated evening bolus feed, no emesis. Pedialyte overnight at 30 mL/hr. Voiding/stooling. PIV infusing mIVF w/o issue. Mom at the bedside. Continue with POC. Notify MD of changes.                  Ms. Leyva presented to the ED on 10/8    HPI:  22 yo  at 8w6d, approximate LMP of 9/15 (previous LMP ) presented to ED with abdominal pain and vaginal spotting. Pt stated she has been having sharp, constant, 9/10 pain for the last 2 weeks, did not take medications. Pain radiated to the mid abdomen, worsened with intercourse (last intercourse day before), no alleviating factors. Reported vaginal spotting for the last day, noted only with wiping, no pads needed, which prompted her to come to ED. Pt also reported yellow-white vaginal discharge for 2 weeks prior, non foul-smelling. Pt found out she was pregnant n ED. Reported regular menses however was unsure of exact LMP. Denied fevers, chills, headache, CP, SOB, N/V/D, dysuria. Last BM day prior, normal. Last intercourse day before. At that time, pt stated she had no abdominal pain. Reported vaginal burning only, no dysuria or hematuria, Unplanned, unsure if desired.     Labs:  10[facility]8 [facility] 5.53>12.4/38.1<219, 138/4.4/100/24/15/0.7<92, AST/ ALT /, UA large leuks  GC negative  Ucx contaminated  [place] [facility] [place]    Imaging:  TVUS 10/8 UTERUS: Anteverted uterus measuring 8.7 cm in length x 5.9 cm x 4.6 cm AP, with normal echogenicity and morphology. The endometrial echo complex measures 1.3 cm, which is normal in thickness. RIGHT OVARY: measures 3.3 x 2.6 x 2.5 cm, and is unremarkable. Doppler flow is demonstrated to the right ovary. LEFT OVARY: measures 4.4 x 4.0 x 2.2 cm, and contains a hemorrhagic cyst measuring 2.0 x 1.8 x 1.4 cm. Doppler flow is demonstrated to the left ovary. OTHER: No free fluid in the pelvis. IMPRESSION: Left ovarian hemorrhagic cyst measuring approximately 2 cm. Otherwise unremarkable exam. Specifically, there is no evidence of  intrauterine gestation. Follow-up beta-hCG levels is recommended.    Dx at this point was pregnancy of  unknown location, r/o ectopic or abnormal pregnancy    Pt went [place] [facility] [place], result was [id number]. Pt made aware of results and scheduled for sono 10/18.     TVUS 10/18: Yolk sac visualized.     ddx now confirmed IUP. GYN will no longer continue to follow.     Dr. Bolaños and Dr. Crawford aware.

## 2024-05-28 ENCOUNTER — APPOINTMENT (OUTPATIENT)
Dept: OBGYN | Facility: CLINIC | Age: 29
End: 2024-05-28

## 2024-05-30 ENCOUNTER — OUTPATIENT (OUTPATIENT)
Dept: OUTPATIENT SERVICES | Facility: HOSPITAL | Age: 29
LOS: 1 days | End: 2024-05-30
Payer: MEDICAID

## 2024-05-30 ENCOUNTER — APPOINTMENT (OUTPATIENT)
Dept: OBGYN | Facility: CLINIC | Age: 29
End: 2024-05-30
Payer: MEDICAID

## 2024-05-30 VITALS
HEART RATE: 78 BPM | DIASTOLIC BLOOD PRESSURE: 70 MMHG | BODY MASS INDEX: 19.87 KG/M2 | WEIGHT: 116.38 LBS | HEIGHT: 64 IN | SYSTOLIC BLOOD PRESSURE: 110 MMHG | OXYGEN SATURATION: 99 %

## 2024-05-30 DIAGNOSIS — O03.9 COMPLETE OR UNSPECIFIED SPONTANEOUS ABORTION WITHOUT COMPLICATION: Chronic | ICD-10-CM

## 2024-05-30 DIAGNOSIS — Z32.01 ENCOUNTER FOR PREGNANCY TEST, RESULT POSITIVE: ICD-10-CM

## 2024-05-30 DIAGNOSIS — Z34.90 ENCOUNTER FOR SUPERVISION OF NORMAL PREGNANCY, UNSPECIFIED, UNSPECIFIED TRIMESTER: ICD-10-CM

## 2024-05-30 DIAGNOSIS — Z98.890 OTHER SPECIFIED POSTPROCEDURAL STATES: Chronic | ICD-10-CM

## 2024-05-30 PROCEDURE — 99213 OFFICE O/P EST LOW 20 MIN: CPT

## 2024-05-30 PROCEDURE — 76815 OB US LIMITED FETUS(S): CPT | Mod: 26

## 2024-05-30 NOTE — HISTORY OF PRESENT ILLNESS
[FreeTextEntry1] : Patient is a 27yo   LMP 2024 presenting today to confirm pregnancy. She reports she desires to initiate prenatal care and states her child is ready for a sibling. She reports having light spotting and breast tenderness, denies heavy bleeding or cramping. Denies significant nausea or other complaints at this time.   ObhHx:  delivery x1, induced  x9 via D&C PMH: Denies any changes to PMH since last visit   Ultrasound TAUS: Gestational sac: PRESENT, in fundal region Yolk Sac: PRESENT Embryo: PRESENT CRL c/w/ 5+5 with cardiac activity

## 2024-05-30 NOTE — PLAN
[FreeTextEntry1] : A/P: 29yo  LMP 24 @ 5+5 by US (Not c/w LMP) with confirmed intrauterine pregnancy, desiring to initiate prenatal care   Prenatal vitamins sent to pharmacy   Gestational sac with irregular contour- recommend 2 week viability assessment - Early pregnancy loss precautions reviewed   RTC in 2 weeks for viability confirmation, establish PNC

## 2024-06-04 NOTE — ED PROVIDER NOTE - GASTROINTESTINAL [+], MLM
I have personally seen and examined the patient. I have collaborated with and supervised the ABDOMINAL PAIN

## 2024-06-17 ENCOUNTER — APPOINTMENT (OUTPATIENT)
Dept: OBGYN | Facility: CLINIC | Age: 29
End: 2024-06-17

## 2024-07-15 ENCOUNTER — NON-APPOINTMENT (OUTPATIENT)
Age: 29
End: 2024-07-15

## 2024-07-17 ENCOUNTER — LABORATORY RESULT (OUTPATIENT)
Age: 29
End: 2024-07-17

## 2024-07-18 ENCOUNTER — OUTPATIENT (OUTPATIENT)
Dept: OUTPATIENT SERVICES | Facility: HOSPITAL | Age: 29
LOS: 1 days | End: 2024-07-18
Payer: MEDICAID

## 2024-07-18 ENCOUNTER — APPOINTMENT (OUTPATIENT)
Dept: OBGYN | Facility: CLINIC | Age: 29
End: 2024-07-18
Payer: MEDICAID

## 2024-07-18 VITALS
BODY MASS INDEX: 20.7 KG/M2 | HEART RATE: 68 BPM | HEIGHT: 64 IN | OXYGEN SATURATION: 100 % | DIASTOLIC BLOOD PRESSURE: 68 MMHG | SYSTOLIC BLOOD PRESSURE: 104 MMHG | WEIGHT: 121.25 LBS

## 2024-07-18 DIAGNOSIS — Z64.0 PROBLEMS RELATED TO UNWANTED PREGNANCY: ICD-10-CM

## 2024-07-18 DIAGNOSIS — Z98.890 OTHER SPECIFIED POSTPROCEDURAL STATES: Chronic | ICD-10-CM

## 2024-07-18 DIAGNOSIS — Z00.00 ENCOUNTER FOR GENERAL ADULT MEDICAL EXAMINATION W/OUT ABNORMAL FINDINGS: ICD-10-CM

## 2024-07-18 DIAGNOSIS — O03.9 COMPLETE OR UNSPECIFIED SPONTANEOUS ABORTION WITHOUT COMPLICATION: Chronic | ICD-10-CM

## 2024-07-18 PROCEDURE — 85027 COMPLETE CBC AUTOMATED: CPT

## 2024-07-18 PROCEDURE — 87661 TRICHOMONAS VAGINALIS AMPLIF: CPT

## 2024-07-18 PROCEDURE — 86780 TREPONEMA PALLIDUM: CPT

## 2024-07-18 PROCEDURE — 87389 HIV-1 AG W/HIV-1&-2 AB AG IA: CPT

## 2024-07-18 PROCEDURE — 86701 HIV-1ANTIBODY: CPT

## 2024-07-18 PROCEDURE — 99214 OFFICE O/P EST MOD 30 MIN: CPT

## 2024-07-18 PROCEDURE — 86900 BLOOD TYPING SEROLOGIC ABO: CPT

## 2024-07-18 PROCEDURE — 86850 RBC ANTIBODY SCREEN: CPT

## 2024-07-18 PROCEDURE — 87491 CHLMYD TRACH DNA AMP PROBE: CPT

## 2024-07-18 PROCEDURE — 87591 N.GONORRHOEAE DNA AMP PROB: CPT

## 2024-07-18 PROCEDURE — 86803 HEPATITIS C AB TEST: CPT

## 2024-07-18 PROCEDURE — 36415 COLL VENOUS BLD VENIPUNCTURE: CPT

## 2024-07-18 PROCEDURE — 87340 HEPATITIS B SURFACE AG IA: CPT

## 2024-07-18 PROCEDURE — 99214 OFFICE O/P EST MOD 30 MIN: CPT | Mod: 25

## 2024-07-18 RX ORDER — DOXYCYCLINE HYCLATE 100 MG/1
100 TABLET ORAL
Qty: 2 | Refills: 0 | Status: ACTIVE | COMMUNITY
Start: 2024-07-18 | End: 1900-01-01

## 2024-07-18 RX ORDER — IBUPROFEN 800 MG/1
800 TABLET, FILM COATED ORAL EVERY 8 HOURS
Qty: 15 | Refills: 0 | Status: ACTIVE | COMMUNITY
Start: 2024-07-18 | End: 1900-01-01

## 2024-07-18 NOTE — ASU PATIENT PROFILE, ADULT - FALL HARM RISK - UNIVERSAL INTERVENTIONS
Bed in lowest position, wheels locked, appropriate side rails in place/Call bell, personal items and telephone in reach/Instruct patient to call for assistance before getting out of bed or chair/Non-slip footwear when patient is out of bed/[place] to call system/Physically safe environment - no spills, clutter or unnecessary equipment/Purposeful Proactive Rounding/Room/bathroom lighting operational, light cord in reach

## 2024-07-19 ENCOUNTER — RESULT REVIEW (OUTPATIENT)
Age: 29
End: 2024-07-19

## 2024-07-19 ENCOUNTER — OUTPATIENT (OUTPATIENT)
Dept: OUTPATIENT SERVICES | Facility: HOSPITAL | Age: 29
LOS: 1 days | Discharge: HOME | End: 2024-07-19
Payer: MEDICAID

## 2024-07-19 ENCOUNTER — TRANSCRIPTION ENCOUNTER (OUTPATIENT)
Age: 29
End: 2024-07-19

## 2024-07-19 VITALS
SYSTOLIC BLOOD PRESSURE: 112 MMHG | OXYGEN SATURATION: 97 % | HEIGHT: 64 IN | DIASTOLIC BLOOD PRESSURE: 61 MMHG | HEART RATE: 66 BPM | TEMPERATURE: 98 F | RESPIRATION RATE: 18 BRPM | WEIGHT: 121.03 LBS

## 2024-07-19 VITALS
OXYGEN SATURATION: 100 % | SYSTOLIC BLOOD PRESSURE: 134 MMHG | DIASTOLIC BLOOD PRESSURE: 68 MMHG | HEART RATE: 57 BPM | TEMPERATURE: 97 F | RESPIRATION RATE: 18 BRPM

## 2024-07-19 DIAGNOSIS — Z98.890 OTHER SPECIFIED POSTPROCEDURAL STATES: Chronic | ICD-10-CM

## 2024-07-19 DIAGNOSIS — O03.9 COMPLETE OR UNSPECIFIED SPONTANEOUS ABORTION WITHOUT COMPLICATION: Chronic | ICD-10-CM

## 2024-07-19 DIAGNOSIS — Z33.2 ENCOUNTER FOR ELECTIVE TERMINATION OF PREGNANCY: ICD-10-CM

## 2024-07-19 LAB
ABO + RH PNL BLD: NORMAL
BLD GP AB SCN SERPL QL: NORMAL
HBV SURFACE AG SER QL: NONREACTIVE
HCT VFR BLD CALC: 32.8 %
HCV AB SER QL: NONREACTIVE
HCV S/CO RATIO: 0.15 S/CO
HGB BLD-MCNC: 10.4 G/DL
MCHC RBC-ENTMCNC: 27.4 PG
MCHC RBC-ENTMCNC: 31.7 G/DL
MCV RBC AUTO: 86.5 FL
PLATELET # BLD AUTO: 241 K/UL
PMV BLD AUTO: 0 /100 WBCS
PMV BLD: 9.6 FL
RBC # BLD: 3.79 M/UL
RBC # FLD: 13.8 %
WBC # FLD AUTO: 6.08 K/UL

## 2024-07-19 PROCEDURE — 59820 CARE OF MISCARRIAGE: CPT

## 2024-07-19 PROCEDURE — 88304 TISSUE EXAM BY PATHOLOGIST: CPT | Mod: 26

## 2024-07-19 PROCEDURE — 88304 TISSUE EXAM BY PATHOLOGIST: CPT

## 2024-07-19 RX ORDER — MEDROXYPROGESTERONE ACETATE 2.5 MG/1
150 TABLET ORAL ONCE
Refills: 0 | Status: COMPLETED | OUTPATIENT
Start: 2024-07-19 | End: 2024-07-19

## 2024-07-19 RX ORDER — ONDANSETRON HYDROCHLORIDE 2 MG/ML
4 INJECTION INTRAMUSCULAR; INTRAVENOUS ONCE
Refills: 0 | Status: COMPLETED | OUTPATIENT
Start: 2024-07-19 | End: 2024-07-19

## 2024-07-19 RX ORDER — DEXTROSE MONOHYDRATE AND SODIUM CHLORIDE 5; .3 G/100ML; G/100ML
1000 INJECTION, SOLUTION INTRAVENOUS
Refills: 0 | Status: DISCONTINUED | OUTPATIENT
Start: 2024-07-19 | End: 2024-07-19

## 2024-07-19 RX ORDER — OXYCODONE HYDROCHLORIDE 100 MG/5ML
5 SOLUTION ORAL ONCE
Refills: 0 | Status: DISCONTINUED | OUTPATIENT
Start: 2024-07-19 | End: 2024-07-19

## 2024-07-19 RX ORDER — ACETAMINOPHEN 325 MG
1000 TABLET ORAL ONCE
Refills: 0 | Status: DISCONTINUED | OUTPATIENT
Start: 2024-07-19 | End: 2024-07-19

## 2024-07-19 RX ORDER — HYDROMORPHONE HCL 0.2 MG/ML
0.5 INJECTION, SOLUTION INTRAVENOUS
Refills: 0 | Status: DISCONTINUED | OUTPATIENT
Start: 2024-07-19 | End: 2024-07-19

## 2024-07-19 RX ADMIN — MEDROXYPROGESTERONE ACETATE 150 MILLIGRAM(S): 2.5 TABLET ORAL at 14:06

## 2024-07-19 RX ADMIN — ONDANSETRON HYDROCHLORIDE 4 MILLIGRAM(S): 2 INJECTION INTRAMUSCULAR; INTRAVENOUS at 14:06

## 2024-07-19 NOTE — ASU DISCHARGE PLAN (ADULT/PEDIATRIC) - ASU DC SPECIAL INSTRUCTIONSFT
PAIN MANAGEMENT:   Alternate Acetaminophen/Tylenol and Ibuprofen/Motrin (if you are eligible). Each of these medications can be taken every six hours. Try to stagger them so that you are taking something for pain every three hours (ex. Take Motrin at 12:00, Tylenol at 3:00, Motrin at 6:00, etc.) to maximize pain relief.  o Dosages       - Tylenol – 500-650 mg every 6 hours as needed       - Motrin/Ibuprofen - 600 mg every 6 hours as needed  o The maximum dose of Tylenol is 4000 mg in 24 hours, the maximum dose of Motrin/ibuprofen is 2400mg in 24 hours  A warm shower or heating pad may also help.    WHAT TO EXPECT AT HOME  -  Do not put anything in the vagina for at least 2 weeks after surgery unless otherwise instructed by your doctor (including tampons, douching, sexual intercourse, etc).  - It is normal to have some vaginal bleeding after surgery that would require the use of a pantiliner.     WHEN TO CALL YOUR DOCTOR:  - Fever (>100.4°F or 38.0°C) or chills  - Severe nausea or persistent vomiting.  - Bright red vaginal bleeding (soaking >1 pad/hour) or foul smelling vaginal drainage.  - Severe pain not relieved with pain medication.  - Pain with urination, cloudy urine, or foul smelling urine.  - Or if you have any other problems or questions. Labs/Imaging Studies/Medications

## 2024-07-19 NOTE — CHART NOTE - NSCHARTNOTEFT_GEN_A_CORE
PACU ANESTHESIA PACU ADMISSION NOTE      Procedure:  Post op diagnosis    ____ Intubated  TV:______       Rate: ______      FiO2: ______    __x__ Patent Airway    ____ Full return of protective reflexes    ____ Full recovery from anesthesia / sedation to baseline status    Viitals:  see anesthesia record            Mental Status:  __x__ Awake   _____ Alert   _____ Drowsy   _____ Sedated    Nausea/Vomiting: ____ Yes, See Post - Op Orders      __x__ No    Pain Scale (0-10): _____    Treatment: ____ None    _x___ See Post - Op/PCA Orders    Post - Operative Fluids:   ____ Oral   ___x_ See Post - Op Orders    Plan:         Discharge:   __x__Home       _____Floor         _____Critical Care    _____Other:_________________    Comments: uneventful perioperative course; no s/s of anesthesia complications noted; D/C home when  criteria met

## 2024-07-19 NOTE — ASU PREOP CHECKLIST - 1.
Po meds given as ordered, Dr Mathis and Dr Blanco aware pt states eating 1/2 eggroll at 0154 this am

## 2024-07-19 NOTE — ASU DISCHARGE PLAN (ADULT/PEDIATRIC) - CARE PROVIDER_API CALL
Yareli Blanco  Obstetrics and Gynecology  [address] [place] [id number]  Phone: [phone]  Fax: [phone]  Follow Up Time: 2 weeks

## 2024-07-19 NOTE — H&P PST ADULT - ATTENDING COMMENTS
see note below Patient presenting today reporting she is firm in her decision, desires DMPA post procedure, will receive 200mg Doxycycline intraoperatively.

## 2024-07-19 NOTE — BRIEF OPERATIVE NOTE - OPERATION/FINDINGS
Normal external genitalia, normal vaginal mucosa  Cervix medium consistency, closed  Procedure completed under ultrasound guidance   Cervix dilated to size 37 verma dilator   Size 12 cannula used to complete aspiration until gritty texture was noted throughout   Small amount of material noted in the upper left aspect, aspirated using MVA with small amount of tissue obtained   Good hemostasis post procedure, endometrial stripe was thin on US post procedure   Aspirate was inspected and villi and fetal parts were identified and noted to be complete for estimated gestational age

## 2024-07-19 NOTE — H&P PST ADULT - HISTORY OF PRESENT ILLNESS
29yo  @ 12+6 (by 5w US in clinic) presents for procedural management of pregnancy termination.     ObhHx:  delivery x1, induced  x9 via D&C

## 2024-07-19 NOTE — H&P PST ADULT - ASSESSMENT
29yo  @ 12+6 (by 5w US in clinic) here for procedural management of termination of pregnancy.     - /18 H/h 10.4/32.8, O pos  - DMPA for contraception   - doxycycline 200mg PO pre-procedure or IV doxycycline 200mg intra-op  - Rx for ibuprofen sent to pharmacy    Discussed with Dr. Blanco.

## 2024-07-19 NOTE — BRIEF OPERATIVE NOTE - NSICDXBRIEFPROCEDURE_GEN_ALL_CORE_FT
PROCEDURES:  D&C, therapeutic, for incomp, missed, septic, or induced , 1st trimester 2024 13:12:45  Yareli Blanco

## 2024-07-22 LAB
C TRACH RRNA SPEC QL NAA+PROBE: NOT DETECTED
N GONORRHOEA RRNA SPEC QL NAA+PROBE: NOT DETECTED
SOURCE AMPLIFICATION: NORMAL
SOURCE AMPLIFICATION: NORMAL
SURGICAL PATHOLOGY STUDY: SIGNIFICANT CHANGE UP
T PALLIDUM AB SER QL IA: NEGATIVE
T VAGINALIS RRNA SPEC QL NAA+PROBE: NOT DETECTED

## 2024-07-26 DIAGNOSIS — Z33.2 ENCOUNTER FOR ELECTIVE TERMINATION OF PREGNANCY: ICD-10-CM

## 2024-07-26 DIAGNOSIS — O99.341 OTHER MENTAL DISORDERS COMPLICATING PREGNANCY, FIRST TRIMESTER: ICD-10-CM

## 2024-07-26 DIAGNOSIS — F90.9 ATTENTION-DEFICIT HYPERACTIVITY DISORDER, UNSPECIFIED TYPE: ICD-10-CM

## 2024-07-30 DIAGNOSIS — Z64.0 PROBLEMS RELATED TO UNWANTED PREGNANCY: ICD-10-CM

## 2024-10-08 ENCOUNTER — APPOINTMENT (OUTPATIENT)
Dept: OBGYN | Facility: CLINIC | Age: 29
End: 2024-10-08

## 2024-12-09 ENCOUNTER — APPOINTMENT (OUTPATIENT)
Dept: OBGYN | Facility: CLINIC | Age: 29
End: 2024-12-09

## 2024-12-31 ENCOUNTER — EMERGENCY (EMERGENCY)
Facility: HOSPITAL | Age: 29
LOS: 0 days | Discharge: HOME | End: 2024-12-31
Attending: EMERGENCY MEDICINE
Payer: MEDICAID

## 2024-12-31 VITALS
DIASTOLIC BLOOD PRESSURE: 75 MMHG | RESPIRATION RATE: 17 BRPM | TEMPERATURE: 98 F | WEIGHT: 119.93 LBS | HEART RATE: 66 BPM | OXYGEN SATURATION: 99 % | HEIGHT: 64 IN | SYSTOLIC BLOOD PRESSURE: 120 MMHG

## 2024-12-31 VITALS
DIASTOLIC BLOOD PRESSURE: 85 MMHG | TEMPERATURE: 98 F | HEART RATE: 74 BPM | RESPIRATION RATE: 18 BRPM | SYSTOLIC BLOOD PRESSURE: 130 MMHG | OXYGEN SATURATION: 100 %

## 2024-12-31 DIAGNOSIS — Z98.890 OTHER SPECIFIED POSTPROCEDURAL STATES: Chronic | ICD-10-CM

## 2024-12-31 DIAGNOSIS — R11.2 NAUSEA WITH VOMITING, UNSPECIFIED: ICD-10-CM

## 2024-12-31 DIAGNOSIS — F12.90 CANNABIS USE, UNSPECIFIED, UNCOMPLICATED: ICD-10-CM

## 2024-12-31 LAB
ALBUMIN SERPL ELPH-MCNC: 4.4 G/DL — SIGNIFICANT CHANGE UP (ref 3.5–5.2)
ALP SERPL-CCNC: 48 U/L — SIGNIFICANT CHANGE UP (ref 30–115)
ALT FLD-CCNC: 11 U/L — SIGNIFICANT CHANGE UP (ref 0–41)
ANION GAP SERPL CALC-SCNC: 14 MMOL/L — SIGNIFICANT CHANGE UP (ref 7–14)
AST SERPL-CCNC: 24 U/L — SIGNIFICANT CHANGE UP (ref 0–41)
BASOPHILS # BLD AUTO: 0.03 K/UL — SIGNIFICANT CHANGE UP (ref 0–0.2)
BASOPHILS NFR BLD AUTO: 0.4 % — SIGNIFICANT CHANGE UP (ref 0–1)
BILIRUB SERPL-MCNC: 0.4 MG/DL — SIGNIFICANT CHANGE UP (ref 0.2–1.2)
BUN SERPL-MCNC: 15 MG/DL — SIGNIFICANT CHANGE UP (ref 10–20)
CALCIUM SERPL-MCNC: 9.3 MG/DL — SIGNIFICANT CHANGE UP (ref 8.4–10.4)
CHLORIDE SERPL-SCNC: 100 MMOL/L — SIGNIFICANT CHANGE UP (ref 98–110)
CO2 SERPL-SCNC: 26 MMOL/L — SIGNIFICANT CHANGE UP (ref 17–32)
CREAT SERPL-MCNC: 0.7 MG/DL — SIGNIFICANT CHANGE UP (ref 0.7–1.5)
EGFR: 119 ML/MIN/1.73M2 — SIGNIFICANT CHANGE UP
EOSINOPHIL # BLD AUTO: 0.02 K/UL — SIGNIFICANT CHANGE UP (ref 0–0.7)
EOSINOPHIL NFR BLD AUTO: 0.3 % — SIGNIFICANT CHANGE UP (ref 0–8)
GLUCOSE SERPL-MCNC: 121 MG/DL — HIGH (ref 70–99)
HCG SERPL QL: NEGATIVE — SIGNIFICANT CHANGE UP
HCT VFR BLD CALC: 38.7 % — SIGNIFICANT CHANGE UP (ref 37–47)
HGB BLD-MCNC: 12.8 G/DL — SIGNIFICANT CHANGE UP (ref 12–16)
IMM GRANULOCYTES NFR BLD AUTO: 0.1 % — SIGNIFICANT CHANGE UP (ref 0.1–0.3)
LIDOCAIN IGE QN: 12 U/L — SIGNIFICANT CHANGE UP (ref 7–60)
LYMPHOCYTES # BLD AUTO: 1.78 K/UL — SIGNIFICANT CHANGE UP (ref 1.2–3.4)
LYMPHOCYTES # BLD AUTO: 26.4 % — SIGNIFICANT CHANGE UP (ref 20.5–51.1)
MCHC RBC-ENTMCNC: 28.1 PG — SIGNIFICANT CHANGE UP (ref 27–31)
MCHC RBC-ENTMCNC: 33.1 G/DL — SIGNIFICANT CHANGE UP (ref 32–37)
MCV RBC AUTO: 84.9 FL — SIGNIFICANT CHANGE UP (ref 81–99)
MONOCYTES # BLD AUTO: 0.46 K/UL — SIGNIFICANT CHANGE UP (ref 0.1–0.6)
MONOCYTES NFR BLD AUTO: 6.8 % — SIGNIFICANT CHANGE UP (ref 1.7–9.3)
NEUTROPHILS # BLD AUTO: 4.45 K/UL — SIGNIFICANT CHANGE UP (ref 1.4–6.5)
NEUTROPHILS NFR BLD AUTO: 66 % — SIGNIFICANT CHANGE UP (ref 42.2–75.2)
NRBC # BLD: 0 /100 WBCS — SIGNIFICANT CHANGE UP (ref 0–0)
PLATELET # BLD AUTO: 249 K/UL — SIGNIFICANT CHANGE UP (ref 130–400)
PMV BLD: 9.9 FL — SIGNIFICANT CHANGE UP (ref 7.4–10.4)
POTASSIUM SERPL-MCNC: 3.7 MMOL/L — SIGNIFICANT CHANGE UP (ref 3.5–5)
POTASSIUM SERPL-SCNC: 3.7 MMOL/L — SIGNIFICANT CHANGE UP (ref 3.5–5)
PROT SERPL-MCNC: 7.6 G/DL — SIGNIFICANT CHANGE UP (ref 6–8)
RBC # BLD: 4.56 M/UL — SIGNIFICANT CHANGE UP (ref 4.2–5.4)
RBC # FLD: 13.2 % — SIGNIFICANT CHANGE UP (ref 11.5–14.5)
SODIUM SERPL-SCNC: 140 MMOL/L — SIGNIFICANT CHANGE UP (ref 135–146)
WBC # BLD: 6.75 K/UL — SIGNIFICANT CHANGE UP (ref 4.8–10.8)
WBC # FLD AUTO: 6.75 K/UL — SIGNIFICANT CHANGE UP (ref 4.8–10.8)

## 2024-12-31 PROCEDURE — 93010 ELECTROCARDIOGRAM REPORT: CPT

## 2024-12-31 PROCEDURE — 93005 ELECTROCARDIOGRAM TRACING: CPT

## 2024-12-31 PROCEDURE — 96372 THER/PROPH/DIAG INJ SC/IM: CPT

## 2024-12-31 PROCEDURE — 74177 CT ABD & PELVIS W/CONTRAST: CPT | Mod: MC

## 2024-12-31 PROCEDURE — 85025 COMPLETE CBC W/AUTO DIFF WBC: CPT

## 2024-12-31 PROCEDURE — 84703 CHORIONIC GONADOTROPIN ASSAY: CPT

## 2024-12-31 PROCEDURE — 83690 ASSAY OF LIPASE: CPT

## 2024-12-31 PROCEDURE — 96374 THER/PROPH/DIAG INJ IV PUSH: CPT | Mod: XU

## 2024-12-31 PROCEDURE — 96375 TX/PRO/DX INJ NEW DRUG ADDON: CPT

## 2024-12-31 PROCEDURE — 80053 COMPREHEN METABOLIC PANEL: CPT

## 2024-12-31 PROCEDURE — 99285 EMERGENCY DEPT VISIT HI MDM: CPT

## 2024-12-31 PROCEDURE — 74177 CT ABD & PELVIS W/CONTRAST: CPT | Mod: 26,MC

## 2024-12-31 PROCEDURE — 36415 COLL VENOUS BLD VENIPUNCTURE: CPT

## 2024-12-31 PROCEDURE — 99285 EMERGENCY DEPT VISIT HI MDM: CPT | Mod: 25

## 2024-12-31 RX ORDER — HALOPERIDOL 2 MG
5 TABLET ORAL ONCE
Refills: 0 | Status: COMPLETED | OUTPATIENT
Start: 2024-12-31 | End: 2024-12-31

## 2024-12-31 RX ORDER — METOCLOPRAMIDE HYDROCHLORIDE 10 MG/1
10 TABLET ORAL ONCE
Refills: 0 | Status: COMPLETED | OUTPATIENT
Start: 2024-12-31 | End: 2024-12-31

## 2024-12-31 RX ORDER — ONDANSETRON HYDROCHLORIDE 4 MG/1
1 TABLET, FILM COATED ORAL
Qty: 1 | Refills: 0
Start: 2024-12-31 | End: 2025-01-04

## 2024-12-31 RX ORDER — 0.9 % SODIUM CHLORIDE 0.9 %
1000 INTRAVENOUS SOLUTION INTRAVENOUS ONCE
Refills: 0 | Status: COMPLETED | OUTPATIENT
Start: 2024-12-31 | End: 2024-12-31

## 2024-12-31 RX ORDER — FAMOTIDINE 20 MG/1
20 TABLET, FILM COATED ORAL ONCE
Refills: 0 | Status: COMPLETED | OUTPATIENT
Start: 2024-12-31 | End: 2024-12-31

## 2024-12-31 RX ORDER — ONDANSETRON HYDROCHLORIDE 4 MG/1
8 TABLET, FILM COATED ORAL ONCE
Refills: 0 | Status: COMPLETED | OUTPATIENT
Start: 2024-12-31 | End: 2024-12-31

## 2024-12-31 RX ADMIN — Medication 25 GRAM(S): at 09:15

## 2024-12-31 RX ADMIN — METOCLOPRAMIDE HYDROCHLORIDE 10 MILLIGRAM(S): 10 TABLET ORAL at 05:48

## 2024-12-31 RX ADMIN — Medication 5 MILLIGRAM(S): at 08:28

## 2024-12-31 RX ADMIN — Medication 1000 MILLILITER(S): at 05:48

## 2024-12-31 RX ADMIN — FAMOTIDINE 100 MILLIGRAM(S): 20 TABLET, FILM COATED ORAL at 07:32

## 2024-12-31 RX ADMIN — Medication 1000 MILLILITER(S): at 08:29

## 2024-12-31 RX ADMIN — ONDANSETRON HYDROCHLORIDE 8 MILLIGRAM(S): 4 TABLET, FILM COATED ORAL at 07:32

## 2024-12-31 NOTE — ED PROVIDER NOTE - ATTENDING CONTRIBUTION TO CARE
29F, +THC use, p/w nv & diffuse abd pain in context of admitted etoh use today. Rpts consistent THC use. Denies f/c, uri sx, cp/sob, diarrhea, melena, brbpr, flank pain, urinary sx, rash.    PE:  thin F, nad  skin warm, dry  ncat  neck supple  rrr nl s1s2 no mrg  ctab no wrr  abd soft mild diffuse ttp, no palpable masses no rgr  back non-tender no cvat  ext no cce dpi  neuro aaox3 grossly nf exam

## 2024-12-31 NOTE — ED ADULT NURSE NOTE - PRIMARY CARE PROVIDER
PMD Island Pedicle Flap-Requiring Vessel Identification Text: The defect edges were debeveled with a #15 scalpel blade.  Given the location of the defect, shape of the defect and the proximity to free margins an island pedicle advancement flap was deemed most appropriate.  Using a sterile surgical marker, an appropriate advancement flap was drawn, based on the axial vessel mentioned above, incorporating the defect, outlining the appropriate donor tissue and placing the expected incisions within the relaxed skin tension lines where possible.    The area thus outlined was incised deep to adipose tissue with a #15 scalpel blade.  The skin margins were undermined to an appropriate distance in all directions around the primary defect and laterally outward around the island pedicle utilizing iris scissors.  There was minimal undermining beneath the pedicle flap.

## 2024-12-31 NOTE — ED PROVIDER NOTE - PATIENT PORTAL LINK FT
You can access the FollowMyHealth Patient Portal offered by [facility] by registering at the following website: [email or web address]. By joining [employer]’s FollowMyHealth portal, you will also be able to view your health information using other applications (apps) compatible with our system.

## 2024-12-31 NOTE — ED PROVIDER NOTE - PROGRESS NOTE DETAILS
JUAN JOSÉ GAYLE:  Patient evaluated and found ill appearing, actively vomiting.   PE pertinent for diffuse abdominal ttp.   DDx: Alcohol Intoxication, CHES, gastritis, gastroenteritis, colitis  Plan: CBC, CMP, Lipase, 1L LR, CT Abd Pelvis w/ con, Reglan ccruz - pt signed out to me by Dr Neff. p/w n/v abd pain 2/2 ETOH/cannabinoid hyperemesis syndrome, pt still nauseous despite zofran and reglan and pepcid and requesting more IVF and medication to help with nausea and help her sleep, discussed risks/benefits/alternatives of haldol and pt would like to try haldol and ivf- meds ordered, will re-eval. ccruz - pt signed out to me by Dr Neff. p/w n/v abd pain 2/2 ETOH/cannabinoid hyperemesis syndrome, pt still nauseous despite zofran and reglan and pepcid and requesting more IVF and medication to help with nausea and help her sleep, discussed risks/benefits/alternatives of haldol and pt would like to try haldol and ivf- meds ordered, will re-eval. Prolong QT mild on EKG- Mag ordered.

## 2024-12-31 NOTE — ED ADULT TRIAGE NOTE - CHIEF COMPLAINT QUOTE
pt BIBEMS from home c/o nausea and vomiting after drinking alcohol today. denies any daily use of alochol.

## 2024-12-31 NOTE — ED PROVIDER NOTE - NSFOLLOWUPINSTRUCTIONS_ED_ALL_ED_FT
Follow up with PMD and GI.    Stop drinking and smoking.    Nausea / Vomiting    Nausea is the feeling that you have to vomit. As nausea gets worse, it can lead to vomiting. Vomiting puts you at an increased risk for dehydration. Older adults and people with other diseases or a weak immune system are at higher risk for dehydration. Drink clear fluids in small but frequent amounts as tolerated. Eat bland, easy-to-digest foods in small amounts as tolerated.    SEEK IMMEDIATE MEDICAL CARE IF YOU HAVE ANY OF THE FOLLOWING SYMPTOMS: fever, inability to keep sufficient fluids down, black or bloody vomitus, black or bloody stools, lightheadedness/dizziness, chest pain, severe headache, rash, shortness of breath, cold or clammy skin, confusion, pain with urination, or any signs of dehydration.

## 2024-12-31 NOTE — ED PROVIDER NOTE - CLINICAL SUMMARY MEDICAL DECISION MAKING FREE TEXT BOX
ccruz - Patient feels better after Haldol pain resolved tolerating p.o. DC home follow-up with PMD and GI as outpatient 1 to 2 weeks, no signs of withdrawal, no acute surgical pathology on imaging, abdomen soft nontender, strict return precautions

## 2024-12-31 NOTE — ED ADULT NURSE NOTE - NSFALLRISKINTERV_ED_ALL_ED
Assistance OOB with selected safe patient handling equipment if applicable/Assistance with ambulation/Communicate fall risk and risk factors to all staff, patient, and family/Provide visual cue: yellow wristband, yellow gown, etc/Reinforce activity limits and safety measures with patient and family/Use of alarms - bed, stretcher, chair and/or video monitoring/Call bell, personal items and telephone in reach/Instruct patient to call for assistance before getting out of bed/chair/stretcher/Non-slip footwear applied when patient is off stretcher/[place] to call system/Physically safe environment - no spills, clutter or unnecessary equipment/Purposeful Proactive Rounding/Room/bathroom lighting operational, light cord in reach

## 2024-12-31 NOTE — ED PROVIDER NOTE - PHYSICAL EXAMINATION
CONSTITUTIONAL: well-appearing, in NAD  SKIN: Warm dry, normal skin turgor  HEAD: NCAT  EYES: EOMI, PERRLA, no scleral icterus, conjunctiva pink  ENT: normal pharynx with no erythema or exudates  NECK: Supple; non tender. Full ROM.  CARD: RRR, no murmurs.  RESP: clear to ausculation b/l. No crackles or wheezing.  ABD: diffuse ttp, non-distended, no rebound or guarding.  EXT: Full ROM, no bony tenderness, no pedal edema, no calf tenderness  NEURO: normal motor. normal sensory. CN II-XII intact. Cerebellar testing normal. Normal gait.  PSYCH: Cooperative, appropriate.

## 2024-12-31 NOTE — ED PROVIDER NOTE - OBJECTIVE STATEMENT
29 year-old, female with PMHx Alcohol Abuse and routine cannabis use who comes to ED for nausea, vomiting, and abdominal pain. Patient refers that she was at a function yesterday and she drank a liter of vodka and smoked cannabis. Patient woke up today AM with diffuse abdominal pain and NBNB vomiting. Otherwise denies fevers, chills, cp, sob, n/v/d, back pain, changes in urinary/stool habitus, and calf tenderness or swelling. Denies any drug use other than cannabis. Not tolerating PO intake. Has had pain like this before previously after drinking large amounts of alcohol and smoking cannabis.

## 2025-01-03 NOTE — ED PROVIDER NOTE - CONSTITUTIONAL NEGATIVE STATEMENT, MLM
Called pt and she did not . Left message that as \"symptoms somewhat better\", she could update us next week or wait til Dr Cowan back or if looking for med adjustment could increase fluoxetine to50 mg or duloxetine to 90 mg. Given my work cell has a block on it, would rec nursing team reaching out to pt on eric phone re above options   no fever and no chills.

## 2025-01-18 NOTE — PHYSICAL EXAM
[Awake] : awake [Alert] : alert [Acute Distress] : no acute distress [Mass] : no breast mass [Nipple Discharge] : no nipple discharge [Axillary LAD] : no axillary lymphadenopathy [Tender] : non tender [Soft] : soft [Oriented x3] : oriented to person, place, and time [Normal] : cervix [Uterine Adnexae] : were not tender and not enlarged [No Bleeding] : there was no active vaginal bleeding none

## 2025-05-23 ENCOUNTER — EMERGENCY (EMERGENCY)
Facility: HOSPITAL | Age: 30
LOS: 0 days | Discharge: HOME | End: 2025-05-23
Attending: EMERGENCY MEDICINE
Payer: MEDICAID

## 2025-05-23 VITALS
WEIGHT: 110.01 LBS | HEART RATE: 68 BPM | HEIGHT: 64 IN | TEMPERATURE: 98 F | SYSTOLIC BLOOD PRESSURE: 129 MMHG | DIASTOLIC BLOOD PRESSURE: 92 MMHG | OXYGEN SATURATION: 99 % | RESPIRATION RATE: 18 BRPM

## 2025-05-23 DIAGNOSIS — Z98.890 OTHER SPECIFIED POSTPROCEDURAL STATES: Chronic | ICD-10-CM

## 2025-05-23 PROCEDURE — 99284 EMERGENCY DEPT VISIT MOD MDM: CPT

## 2025-05-23 PROCEDURE — 96372 THER/PROPH/DIAG INJ SC/IM: CPT

## 2025-05-23 PROCEDURE — 99283 EMERGENCY DEPT VISIT LOW MDM: CPT | Mod: 25

## 2025-05-23 RX ORDER — DROPERIDOL 2.5 MG/ML
5 INJECTION, SOLUTION INTRAMUSCULAR; INTRAVENOUS ONCE
Refills: 0 | Status: COMPLETED | OUTPATIENT
Start: 2025-05-23 | End: 2025-05-23

## 2025-05-23 RX ORDER — IBUPROFEN 200 MG
600 TABLET ORAL ONCE
Refills: 0 | Status: COMPLETED | OUTPATIENT
Start: 2025-05-23 | End: 2025-05-23

## 2025-05-23 RX ORDER — ACETAMINOPHEN 500 MG/5ML
975 LIQUID (ML) ORAL ONCE
Refills: 0 | Status: COMPLETED | OUTPATIENT
Start: 2025-05-23 | End: 2025-05-23

## 2025-05-23 RX ORDER — MAGNESIUM, ALUMINUM HYDROXIDE 200-200 MG
30 TABLET,CHEWABLE ORAL ONCE
Refills: 0 | Status: COMPLETED | OUTPATIENT
Start: 2025-05-23 | End: 2025-05-23

## 2025-05-23 RX ADMIN — Medication 600 MILLIGRAM(S): at 20:11

## 2025-05-23 RX ADMIN — Medication 30 MILLILITER(S): at 20:11

## 2025-05-23 RX ADMIN — Medication 20 MILLIGRAM(S): at 20:11

## 2025-05-23 RX ADMIN — DROPERIDOL 5 MILLIGRAM(S): 2.5 INJECTION, SOLUTION INTRAMUSCULAR; INTRAVENOUS at 20:11

## 2025-05-23 RX ADMIN — Medication 975 MILLIGRAM(S): at 21:14

## 2025-05-23 NOTE — ED ADULT TRIAGE NOTE - CHIEF COMPLAINT QUOTE
BIBA with complaints of abdominal pain with vomiting since this morning. Has been drinking last night.

## 2025-05-23 NOTE — ED PROVIDER NOTE - PHYSICAL EXAMINATION
Constitutional: Well developed, well nourished, no acute distress, Actively Retching at bedside  Head: Normocephalic, Atraumatic  Eyes: PERRLA, EOMI, conjunctiva and sclera WNL  ENT: Moist mucous membranes, no rhinorrhea,    Neck: Supple, Nontender,   Respiratory: Normal chest excursion with respiration; Breath sounds clear and equal B/L; No wheezes, rales, or rhonchi   Cardiovascular: RRR; Normal S1, S2; No murmurs, rubs or gallops   ABD/GI: Nondistended; Nontender; No guarding, rigidity or rebound; No CVA tenderness  EXT/MS: Moving all extremities; Distal pulses 2+ B/L   Skin: Normal for age and race; Warm and dry   Neurologic: AAO x 4;  Normal motor and sensory function  Psychiatric: Appropriate affect, normal mood

## 2025-05-23 NOTE — ED PROVIDER NOTE - PATIENT PORTAL LINK FT
You can access the FollowMyHealth Patient Portal offered by [facility] by registering at the following website: [email or web address]. By joining [employer]’s FollowMyHealth portal, you will also be able to view your health information using other applications (apps) compatible with our system.

## 2025-05-23 NOTE — ED PROVIDER NOTE - PROGRESS NOTE DETAILS
Pt presents to ED with father c/o abdominal pain, onset yesterday around 4pm. Pt states she vomitted and had diarrhea. Vitals are stable.  Pt afebrile, able to ambulate without assist.      Merna Beltre RN  02/24/22 0308 Patient reevaluated, reports symptoms have improved.  Patient ready for discharge.

## 2025-05-23 NOTE — ED PROVIDER NOTE - OBJECTIVE STATEMENT
29-year-old female with past medical history of syphilis, chlamydia, ADHD, active marijuana smoker, alcohol abuse presenting with complaint of nausea vomiting abdominal pain for 1 day.  Patient reports she drank 3 cups of vodka last night and woke up this morning nauseous with abdominal pain.  Patient also states she was smoking marijuana last night.  Denies any fever, no  symptoms, no rashes.  Patient has no other complaints at this time.

## 2025-07-12 NOTE — DISCHARGE NOTE ADULT - NS AS DC DISCHARGE ACCOMPANY
"Bite-Adult-    " Family

## 2025-07-21 ENCOUNTER — NON-APPOINTMENT (OUTPATIENT)
Age: 30
End: 2025-07-21

## 2025-07-22 ENCOUNTER — APPOINTMENT (OUTPATIENT)
Dept: OBGYN | Facility: CLINIC | Age: 30
End: 2025-07-22
Payer: MEDICAID

## 2025-07-22 ENCOUNTER — OUTPATIENT (OUTPATIENT)
Dept: OUTPATIENT SERVICES | Facility: HOSPITAL | Age: 30
LOS: 1 days | End: 2025-07-22
Payer: MEDICAID

## 2025-07-22 ENCOUNTER — LABORATORY RESULT (OUTPATIENT)
Age: 30
End: 2025-07-22

## 2025-07-22 ENCOUNTER — NON-APPOINTMENT (OUTPATIENT)
Age: 30
End: 2025-07-22

## 2025-07-22 VITALS — DIASTOLIC BLOOD PRESSURE: 72 MMHG | SYSTOLIC BLOOD PRESSURE: 110 MMHG | BODY MASS INDEX: 20.77 KG/M2 | WEIGHT: 121 LBS

## 2025-07-22 DIAGNOSIS — Z00.00 ENCOUNTER FOR GENERAL ADULT MEDICAL EXAMINATION W/OUT ABNORMAL FINDINGS: ICD-10-CM

## 2025-07-22 DIAGNOSIS — Z98.890 OTHER SPECIFIED POSTPROCEDURAL STATES: Chronic | ICD-10-CM

## 2025-07-22 DIAGNOSIS — Z64.0 PROBLEMS RELATED TO UNWANTED PREGNANCY: ICD-10-CM

## 2025-07-22 PROCEDURE — 86900 BLOOD TYPING SEROLOGIC ABO: CPT

## 2025-07-22 PROCEDURE — 86901 BLOOD TYPING SEROLOGIC RH(D): CPT

## 2025-07-22 PROCEDURE — 87491 CHLMYD TRACH DNA AMP PROBE: CPT

## 2025-07-22 PROCEDURE — 36415 COLL VENOUS BLD VENIPUNCTURE: CPT

## 2025-07-22 PROCEDURE — 99214 OFFICE O/P EST MOD 30 MIN: CPT

## 2025-07-22 PROCEDURE — 76815 OB US LIMITED FETUS(S): CPT | Mod: 26

## 2025-07-22 PROCEDURE — 87591 N.GONORRHOEAE DNA AMP PROB: CPT

## 2025-07-22 PROCEDURE — 86850 RBC ANTIBODY SCREEN: CPT

## 2025-07-22 PROCEDURE — 87661 TRICHOMONAS VAGINALIS AMPLIF: CPT

## 2025-07-24 LAB
ABORH: NORMAL
ANTIBODY SCREEN: NORMAL

## 2025-07-31 DIAGNOSIS — Z3A.01 LESS THAN 8 WEEKS GESTATION OF PREGNANCY: ICD-10-CM

## 2025-07-31 DIAGNOSIS — Z30.09 ENCOUNTER FOR OTHER GENERAL COUNSELING AND ADVICE ON CONTRACEPTION: ICD-10-CM

## 2025-08-01 ENCOUNTER — OUTPATIENT (OUTPATIENT)
Dept: OUTPATIENT SERVICES | Facility: HOSPITAL | Age: 30
LOS: 1 days | Discharge: HOME | End: 2025-08-01
Payer: MEDICAID

## 2025-08-01 ENCOUNTER — TRANSCRIPTION ENCOUNTER (OUTPATIENT)
Age: 30
End: 2025-08-01

## 2025-08-01 ENCOUNTER — RESULT REVIEW (OUTPATIENT)
Age: 30
End: 2025-08-01

## 2025-08-01 VITALS
RESPIRATION RATE: 20 BRPM | SYSTOLIC BLOOD PRESSURE: 120 MMHG | OXYGEN SATURATION: 100 % | HEART RATE: 62 BPM | DIASTOLIC BLOOD PRESSURE: 60 MMHG

## 2025-08-01 VITALS
RESPIRATION RATE: 18 BRPM | HEART RATE: 53 BPM | SYSTOLIC BLOOD PRESSURE: 112 MMHG | TEMPERATURE: 98 F | OXYGEN SATURATION: 98 % | WEIGHT: 121.03 LBS | HEIGHT: 64 IN | DIASTOLIC BLOOD PRESSURE: 56 MMHG

## 2025-08-01 DIAGNOSIS — Z98.890 OTHER SPECIFIED POSTPROCEDURAL STATES: Chronic | ICD-10-CM

## 2025-08-01 DIAGNOSIS — Z64.0 PROBLEMS RELATED TO UNWANTED PREGNANCY: ICD-10-CM

## 2025-08-01 LAB — BLD GP AB SCN SERPL QL: SIGNIFICANT CHANGE UP

## 2025-08-01 PROCEDURE — 88304 TISSUE EXAM BY PATHOLOGIST: CPT | Mod: 26

## 2025-08-01 PROCEDURE — 36415 COLL VENOUS BLD VENIPUNCTURE: CPT

## 2025-08-01 PROCEDURE — 86900 BLOOD TYPING SEROLOGIC ABO: CPT

## 2025-08-01 PROCEDURE — 86850 RBC ANTIBODY SCREEN: CPT

## 2025-08-01 PROCEDURE — 88304 TISSUE EXAM BY PATHOLOGIST: CPT

## 2025-08-01 PROCEDURE — 86901 BLOOD TYPING SEROLOGIC RH(D): CPT

## 2025-08-01 RX ORDER — OXYCODONE HYDROCHLORIDE 30 MG/1
5 TABLET ORAL ONCE
Refills: 0 | Status: DISCONTINUED | OUTPATIENT
Start: 2025-08-01 | End: 2025-08-01

## 2025-08-01 RX ORDER — ONDANSETRON HCL/PF 4 MG/2 ML
4 VIAL (ML) INJECTION ONCE
Refills: 0 | Status: DISCONTINUED | OUTPATIENT
Start: 2025-08-01 | End: 2025-08-01

## 2025-08-01 RX ORDER — MEDROXYPROGESTERONE ACETATE 10 MG
150 TABLET ORAL DAILY
Refills: 0 | Status: DISCONTINUED | OUTPATIENT
Start: 2025-08-01 | End: 2025-08-01

## 2025-08-01 RX ORDER — HYDROMORPHONE/SOD CHLOR,ISO/PF 2 MG/10 ML
0.5 SYRINGE (ML) INJECTION
Refills: 0 | Status: DISCONTINUED | OUTPATIENT
Start: 2025-08-01 | End: 2025-08-01

## 2025-08-01 RX ORDER — ACETAMINOPHEN 500 MG/5ML
1000 LIQUID (ML) ORAL ONCE
Refills: 0 | Status: DISCONTINUED | OUTPATIENT
Start: 2025-08-01 | End: 2025-08-01

## 2025-08-01 RX ORDER — SODIUM CHLORIDE 9 G/1000ML
1000 INJECTION, SOLUTION INTRAVENOUS
Refills: 0 | Status: DISCONTINUED | OUTPATIENT
Start: 2025-08-01 | End: 2025-08-01

## 2025-08-01 RX ADMIN — Medication 150 MILLIGRAM(S): at 13:16

## 2025-08-07 DIAGNOSIS — Z33.2 ENCOUNTER FOR ELECTIVE TERMINATION OF PREGNANCY: ICD-10-CM

## 2025-08-07 LAB — SURGICAL PATHOLOGY STUDY: SIGNIFICANT CHANGE UP
